# Patient Record
Sex: FEMALE | Race: WHITE | NOT HISPANIC OR LATINO | Employment: OTHER | URBAN - METROPOLITAN AREA
[De-identification: names, ages, dates, MRNs, and addresses within clinical notes are randomized per-mention and may not be internally consistent; named-entity substitution may affect disease eponyms.]

---

## 2017-05-01 PROBLEM — N20.0 KIDNEY STONE: Status: ACTIVE | Noted: 2017-05-01

## 2017-05-01 PROBLEM — I10 ESSENTIAL HYPERTENSION: Status: ACTIVE | Noted: 2017-05-01

## 2017-05-01 PROBLEM — M19.90 ARTHRITIS: Status: ACTIVE | Noted: 2017-05-01

## 2017-05-01 PROBLEM — N28.1 RENAL CYST: Status: ACTIVE | Noted: 2017-05-01

## 2017-05-01 PROBLEM — I77.9 CAROTID ARTERY DISEASE: Status: ACTIVE | Noted: 2017-05-01

## 2017-05-01 PROBLEM — E78.5 HLD (HYPERLIPIDEMIA): Status: ACTIVE | Noted: 2017-05-01

## 2017-05-01 PROBLEM — N25.81 SECONDARY HYPERPARATHYROIDISM OF RENAL ORIGIN: Status: ACTIVE | Noted: 2017-05-01

## 2017-05-01 PROBLEM — K21.9 GERD (GASTROESOPHAGEAL REFLUX DISEASE): Status: ACTIVE | Noted: 2017-05-01

## 2017-05-01 PROBLEM — I73.9 PAD (PERIPHERAL ARTERY DISEASE): Status: ACTIVE | Noted: 2017-05-01

## 2017-05-01 PROBLEM — Z72.0 TOBACCO ABUSE: Status: ACTIVE | Noted: 2017-05-01

## 2017-05-01 PROBLEM — E79.0 HYPERURICEMIA: Status: ACTIVE | Noted: 2017-05-01

## 2017-05-01 PROBLEM — N18.30 STAGE 3 CHRONIC KIDNEY DISEASE: Status: ACTIVE | Noted: 2017-05-01

## 2017-05-01 PROBLEM — J44.9 COPD (CHRONIC OBSTRUCTIVE PULMONARY DISEASE): Status: ACTIVE | Noted: 2017-05-01

## 2017-07-30 PROBLEM — R91.1 PULMONARY NODULE: Status: ACTIVE | Noted: 2017-07-30

## 2017-08-30 PROBLEM — R11.2 INTRACTABLE VOMITING WITH NAUSEA: Status: ACTIVE | Noted: 2017-08-30

## 2017-08-31 PROBLEM — R79.89 ELEVATED TROPONIN: Status: ACTIVE | Noted: 2017-08-31

## 2017-08-31 PROBLEM — I25.810 CORONARY ARTERY DISEASE INVOLVING AUTOLOGOUS VEIN BYPASS GRAFT: Status: ACTIVE | Noted: 2017-08-31

## 2017-08-31 PROBLEM — N17.9 AKI (ACUTE KIDNEY INJURY): Status: ACTIVE | Noted: 2017-08-31

## 2017-09-01 PROBLEM — E87.6 HYPOKALEMIA: Status: ACTIVE | Noted: 2017-09-01

## 2017-09-01 PROBLEM — E87.1 HYPONATREMIA: Status: ACTIVE | Noted: 2017-09-01

## 2017-09-01 PROBLEM — E83.39 HYPOPHOSPHATEMIA: Status: ACTIVE | Noted: 2017-09-01

## 2017-09-01 PROBLEM — N39.0 UTI (URINARY TRACT INFECTION): Status: ACTIVE | Noted: 2017-09-01

## 2017-09-01 PROBLEM — I25.810 CORONARY ARTERY DISEASE INVOLVING AUTOLOGOUS VEIN BYPASS GRAFT: Status: ACTIVE | Noted: 2017-09-01

## 2017-09-01 PROBLEM — R79.89 ELEVATED TROPONIN: Status: ACTIVE | Noted: 2017-09-01

## 2017-09-01 PROBLEM — N17.9 AKI (ACUTE KIDNEY INJURY): Status: ACTIVE | Noted: 2017-09-01

## 2017-09-02 ENCOUNTER — NURSE TRIAGE (OUTPATIENT)
Dept: ADMINISTRATIVE | Facility: CLINIC | Age: 67
End: 2017-09-02

## 2017-09-02 NOTE — TELEPHONE ENCOUNTER
Reason for Disposition   Caller has medication question only, adult not sick, and triager answers question    Protocols used: ST MEDICATION QUESTION CALL-A-AH    Spouse request information on d/c medication . Education completed per [facility] On [facility] Advice including taking Potassium 20 meq 1 tab daily as prescribed as of 9/1/17. Patient/ Caregive verbalize understanding.

## 2017-09-06 ENCOUNTER — PATIENT OUTREACH (OUTPATIENT)
Dept: ADMINISTRATIVE | Facility: CLINIC | Age: 67
End: 2017-09-06

## 2017-09-06 NOTE — PROGRESS NOTES
C3 nurse attempted to contact patient. The following occurred:   C3 nurse attempted to contact Caryn Rodriguez for a TCC post hospital discharge follow up call. The patient is unable to conduct the call @ this time. The patient requested a callback.    The patient has a scheduled HOSFU appointment with Dr Sterling Wellington on 09/12/17 @ 0820 hrs.

## 2017-09-06 NOTE — PROGRESS NOTES
TCC script completed with patient. Reports that [facility] asked if she wanted homehealth and I refused but now I think I need them. Message sent to [place] [facility].

## 2017-09-06 NOTE — PATIENT INSTRUCTIONS
"How to Control Nausea and Vomiting     Taken before meals, medicines can help ease nausea.    Nausea is feeling that you need to throw up. Throwing up occurs when your body forces food that is in your stomach out through your mouth. Nausea and vomiting are symptoms that are caused by many things. They can happen when a condition or disease, medicine, medical treatment, or a poisonous substance affects the area in your brain that controls vomiting. Some conditions or diseases can cause nausea, abdominal pain or cramps, and vomiting. The symptoms can be mild and go away by themselves. Other symptoms can be serious. You will need to see your healthcare provider for these.  Nausea and vomiting are common. They can be caused by many things. These include:  · "Stomach flu" (gastroenteritis)  · Food poisoning  · Stomach pain (gastritis)  · Blockages  They can also be caused by a head injury, an infection in the brain or inside the ear, or migraines. Other common causes of nausea and vomiting include:  · Brain tumor  · Brain bruise  · Motion sickness  · Drugs. These include alcohol, pain medicines such as morphine, and cancer medicines.  · Toxins. These are poisonous things like plants or liquids that are swallowed by accident.  · Advanced types of cancer  · Movement problems (psychogenic problems)  · Extra pressure in the fluid that surrounds the brain and spinal cord (elevated intracranial pressure)     Nausea and vomiting are also common side effects of chemotherapy and radiation therapy. Side effects happen when treatment changes some normal cells as well as cancer cells. In this case, the cells lining your stomach and the part of your brain that controls vomiting are affected. Other more serious causes of vomiting may be hard to find early in the illness.     When to seek medical advice  Call your healthcare provider right away if you have the following:  · Nausea or vomiting that lasts 24 hours or more  · Trouble " keeping fluids down   Medicines can help  Nausea or vomiting can often be prevented or controlled with medicines (antiemetics). Your doctor may give you antiemetics before or after treatment if you are getting chemotherapy or other medical treatments that cause nausea or vomiting.  Eating tips  · If you have medicines to control nausea, take them before meals as directed.  · Avoid fatty or greasy foods while nauseated.  · Eat small meals slowly throughout the day.  · Ask someone to sit with you while you eat to keep you from thinking about feeling nauseated.  · Eat foods at room temperature or colder to avoid strong smells.  · Eat dry foods, such as toast, crackers, or pretzels. Also eat cool, light foods, such as applesauce, and bland foods, such as oatmeal or skinned chicken.   Other ways to feel better  · Get a little fresh air. Take a short walk.  · Talk to a friend, listen to music, or watch TV.  · Take a few deep, slow breaths.  · Eat by candlelight or in surroundings that you find relaxing.  · Use a technique, such as guided imagery, to help you relax. Imagine yourself in a beautiful, restful scene. Or daydream about the place youd most like to be.  Date Last Reviewed: 1/6/2016  © 8546-3177 [employer]. [address] [place] [address], [place] [address]. All rights reserved. This information is not intended as a substitute for professional medical care. Always follow your healthcare professional's instructions.

## 2017-10-15 PROBLEM — R60.0 LOCALIZED EDEMA: Status: ACTIVE | Noted: 2017-10-15

## 2018-01-08 PROBLEM — E83.52 HYPERCALCEMIA: Status: ACTIVE | Noted: 2018-01-08

## 2018-03-16 PROBLEM — Z72.0 TOBACCO ABUSE: Status: RESOLVED | Noted: 2017-05-01 | Resolved: 2018-03-16

## 2019-03-11 PROBLEM — R55 SYNCOPE AND COLLAPSE: Status: ACTIVE | Noted: 2019-03-11

## 2019-11-20 PROBLEM — H25.13 NUCLEAR SCLEROSIS, BILATERAL: Status: ACTIVE | Noted: 2019-11-20

## 2019-11-20 PROBLEM — H25.13 NUCLEAR SCLEROSIS, BILATERAL: Status: RESOLVED | Noted: 2019-11-20 | Resolved: 2019-11-20

## 2020-01-07 PROBLEM — R60.0 LOCALIZED EDEMA: Status: RESOLVED | Noted: 2017-10-15 | Resolved: 2020-01-07

## 2020-01-07 PROBLEM — E83.39 HYPOPHOSPHATEMIA: Status: RESOLVED | Noted: 2017-09-01 | Resolved: 2020-01-07

## 2020-01-07 PROBLEM — E87.6 HYPOKALEMIA: Status: RESOLVED | Noted: 2017-09-01 | Resolved: 2020-01-07

## 2020-01-07 PROBLEM — N39.0 UTI (URINARY TRACT INFECTION): Status: RESOLVED | Noted: 2017-09-01 | Resolved: 2020-01-07

## 2020-01-07 PROBLEM — E87.1 HYPONATREMIA: Status: RESOLVED | Noted: 2017-09-01 | Resolved: 2020-01-07

## 2020-01-07 PROBLEM — R79.89 ELEVATED TROPONIN: Status: RESOLVED | Noted: 2017-09-01 | Resolved: 2020-01-07

## 2020-01-07 PROBLEM — I50.32 CHRONIC DIASTOLIC HEART FAILURE: Status: ACTIVE | Noted: 2020-01-07

## 2020-01-07 PROBLEM — E83.52 HYPERCALCEMIA: Status: RESOLVED | Noted: 2018-01-08 | Resolved: 2020-01-07

## 2020-01-07 PROBLEM — R55 SYNCOPE AND COLLAPSE: Status: RESOLVED | Noted: 2019-03-11 | Resolved: 2020-01-07

## 2020-01-07 PROBLEM — R11.2 INTRACTABLE VOMITING WITH NAUSEA: Status: RESOLVED | Noted: 2017-08-30 | Resolved: 2020-01-07

## 2020-01-31 PROBLEM — M54.16 ACUTE LEFT LUMBAR RADICULOPATHY: Status: ACTIVE | Noted: 2020-01-31

## 2020-02-01 ENCOUNTER — NURSE TRIAGE (OUTPATIENT)
Dept: ADMINISTRATIVE | Facility: CLINIC | Age: 70
End: 2020-02-01

## 2020-02-01 NOTE — TELEPHONE ENCOUNTER
Patient states she wants to take Benadryl advised as per protocol. Patient states if the Benadryl doesn't resolve the problem she will go to the ED  Reason for Disposition   Could be severe allergic reaction   [1] Widespread hives, itching or facial swelling AND [2] onset < 2 hours of exposure to high-risk allergen (e.g., sting, nuts, 1st dose of antibiotic)    Additional Information   Negative: [1] Life-threatening reaction (anaphylaxis) in the past to similar substance (e.g., food, insect bite/sting, chemical, etc.) AND [2] < 2 hours since exposure   Negative: Difficulty breathing or wheezing   Negative: [1] Difficulty swallowing or slurred speech AND [2] sudden onset   Negative: Sounds like a life-threatening emergency to the triager   Negative: [1] Life-threatening reaction in the past to similar substance (e.g., food, insect bite/sting, medication, etc.) AND [2] < 2 hours since exposure   Negative: Wheezing, stridor, hoarseness, or difficulty breathing   Negative: [1] Tightness in the chest or throat AND [2] begins within 2 hours of exposure to allergic substance   Negative: Difficulty swallowing, drooling or slurred speech   Negative: Difficult to awaken or acting confused (e.g., disoriented, slurred speech)   Negative: Unresponsive, passed out or very weak   Negative: Other symptom of severe allergic reaction (Exception: Hives or facial swelling alone)   Negative: Sounds like a life-threatening emergency to the triager   Negative: [1] Widespread hives AND [2] onset > 2 hours after exposure to high-risk allergen (e.g., sting, nuts, 1st dose of antibiotic)   Negative: [1] Widespread itching AND [2] onset > 2 hours after exposure to high-risk allergen (e.g., sting, nuts, 1st dose of antibiotic)   Negative: [1] Face swelling AND [2] onset > 2 hours after exposure to high-risk allergen (e.g., sting, nuts, 1st dose of antibiotic)    Protocols used: ITCHING - WIDESPREAD-A-, [id number]

## 2020-03-13 PROBLEM — M47.896 OTHER SPONDYLOSIS, LUMBAR REGION: Status: ACTIVE | Noted: 2020-03-13

## 2020-07-13 ENCOUNTER — CLINICAL SUPPORT (OUTPATIENT)
Dept: URGENT CARE | Facility: CLINIC | Age: 70
End: 2020-07-13
Payer: COMMERCIAL

## 2020-07-13 VITALS — TEMPERATURE: 97 F

## 2020-07-13 DIAGNOSIS — Z01.818 PRE-OP EVALUATION: ICD-10-CM

## 2020-07-13 PROCEDURE — U0003 INFECTIOUS AGENT DETECTION BY NUCLEIC ACID (DNA OR RNA); SEVERE ACUTE RESPIRATORY SYNDROME CORONAVIRUS 2 (SARS-COV-2) (CORONAVIRUS DISEASE [COVID-19]), AMPLIFIED PROBE TECHNIQUE, MAKING USE OF HIGH THROUGHPUT TECHNOLOGIES AS DESCRIBED BY CMS-2020-01-R: HCPCS

## 2020-07-14 LAB — SARS-COV-2 RNA RESP QL NAA+PROBE: NOT DETECTED

## 2020-08-06 ENCOUNTER — HOSPITAL ENCOUNTER (INPATIENT)
Facility: OTHER | Age: 70
LOS: 3 days | Discharge: HOME HEALTH | DRG: 377 | End: 2020-08-09
Attending: INTERNAL MEDICINE | Admitting: INTERNAL MEDICINE
Payer: COMMERCIAL

## 2020-08-06 DIAGNOSIS — I95.9 HYPOTENSION: ICD-10-CM

## 2020-08-06 DIAGNOSIS — J44.9 CHRONIC OBSTRUCTIVE PULMONARY DISEASE, UNSPECIFIED COPD TYPE: ICD-10-CM

## 2020-08-06 DIAGNOSIS — K92.2 ACUTE GI BLEEDING: Primary | ICD-10-CM

## 2020-08-06 PROBLEM — E87.20 LACTIC ACIDOSIS: Status: ACTIVE | Noted: 2020-08-06

## 2020-08-06 PROBLEM — N18.30 ACUTE RENAL FAILURE SUPERIMPOSED ON STAGE 3 CHRONIC KIDNEY DISEASE: Status: ACTIVE | Noted: 2020-08-06

## 2020-08-06 PROBLEM — N17.9 ACUTE RENAL FAILURE SUPERIMPOSED ON STAGE 3 CHRONIC KIDNEY DISEASE: Status: ACTIVE | Noted: 2020-08-06

## 2020-08-06 PROBLEM — R79.1 SUPRATHERAPEUTIC INR: Status: ACTIVE | Noted: 2020-08-06

## 2020-08-06 LAB
BASOPHILS # BLD AUTO: ABNORMAL K/UL (ref 0–0.2)
BASOPHILS NFR BLD: 0 % (ref 0–1.9)
DIFFERENTIAL METHOD: ABNORMAL
EOSINOPHIL # BLD AUTO: ABNORMAL K/UL (ref 0–0.5)
EOSINOPHIL NFR BLD: 0 % (ref 0–8)
ERYTHROCYTE [DISTWIDTH] IN BLOOD BY AUTOMATED COUNT: 14.1 % (ref 11.5–14.5)
HCT VFR BLD AUTO: 30.8 % (ref 37–48.5)
HGB BLD-MCNC: 9.6 G/DL (ref 12–16)
IMM GRANULOCYTES # BLD AUTO: ABNORMAL K/UL (ref 0–0.04)
IMM GRANULOCYTES NFR BLD AUTO: ABNORMAL % (ref 0–0.5)
LACTATE SERPL-SCNC: 2.6 MMOL/L (ref 0.5–2.2)
LYMPHOCYTES # BLD AUTO: ABNORMAL K/UL (ref 1–4.8)
LYMPHOCYTES NFR BLD: 9 % (ref 18–48)
MCH RBC QN AUTO: 29 PG (ref 27–31)
MCHC RBC AUTO-ENTMCNC: 31.2 G/DL (ref 32–36)
MCV RBC AUTO: 93 FL (ref 82–98)
MONOCYTES # BLD AUTO: ABNORMAL K/UL (ref 0.3–1)
MONOCYTES NFR BLD: 3 % (ref 4–15)
NEUTROPHILS NFR BLD: 88 % (ref 38–73)
NRBC BLD-RTO: 0 /100 WBC
PLATELET # BLD AUTO: 337 K/UL (ref 150–350)
PMV BLD AUTO: 9.6 FL (ref 9.2–12.9)
RBC # BLD AUTO: 3.31 M/UL (ref 4–5.4)
WBC # BLD AUTO: 44.94 K/UL (ref 3.9–12.7)

## 2020-08-06 PROCEDURE — 99223 1ST HOSP IP/OBS HIGH 75: CPT | Mod: ,,, | Performed by: PHYSICIAN ASSISTANT

## 2020-08-06 PROCEDURE — 86920 COMPATIBILITY TEST SPIN: CPT

## 2020-08-06 PROCEDURE — 36415 COLL VENOUS BLD VENIPUNCTURE: CPT

## 2020-08-06 PROCEDURE — 85027 COMPLETE CBC AUTOMATED: CPT

## 2020-08-06 PROCEDURE — 63600175 PHARM REV CODE 636 W HCPCS: Performed by: PHYSICIAN ASSISTANT

## 2020-08-06 PROCEDURE — 20000000 HC ICU ROOM

## 2020-08-06 PROCEDURE — 93010 ELECTROCARDIOGRAM REPORT: CPT | Mod: ,,, | Performed by: INTERNAL MEDICINE

## 2020-08-06 PROCEDURE — 93010 EKG 12-LEAD: ICD-10-PCS | Mod: ,,, | Performed by: INTERNAL MEDICINE

## 2020-08-06 PROCEDURE — 99223 PR INITIAL HOSPITAL CARE,LEVL III: ICD-10-PCS | Mod: ,,, | Performed by: PHYSICIAN ASSISTANT

## 2020-08-06 PROCEDURE — 25000003 PHARM REV CODE 250: Performed by: PHYSICIAN ASSISTANT

## 2020-08-06 PROCEDURE — 99900035 HC TECH TIME PER 15 MIN (STAT)

## 2020-08-06 PROCEDURE — 93005 ELECTROCARDIOGRAM TRACING: CPT

## 2020-08-06 PROCEDURE — C9113 INJ PANTOPRAZOLE SODIUM, VIA: HCPCS | Performed by: PHYSICIAN ASSISTANT

## 2020-08-06 PROCEDURE — 83605 ASSAY OF LACTIC ACID: CPT | Mod: 91

## 2020-08-06 PROCEDURE — 86850 RBC ANTIBODY SCREEN: CPT | Mod: 91

## 2020-08-06 PROCEDURE — 85007 BL SMEAR W/DIFF WBC COUNT: CPT

## 2020-08-06 RX ORDER — CIPROFLOXACIN 2 MG/ML
200 INJECTION, SOLUTION INTRAVENOUS
Status: DISCONTINUED | OUTPATIENT
Start: 2020-08-07 | End: 2020-08-09 | Stop reason: HOSPADM

## 2020-08-06 RX ORDER — SODIUM CHLORIDE 9 MG/ML
INJECTION, SOLUTION INTRAVENOUS CONTINUOUS
Status: DISCONTINUED | OUTPATIENT
Start: 2020-08-06 | End: 2020-08-09

## 2020-08-06 RX ORDER — MONTELUKAST SODIUM 10 MG/1
10 TABLET ORAL NIGHTLY
Status: DISCONTINUED | OUTPATIENT
Start: 2020-08-06 | End: 2020-08-09 | Stop reason: HOSPADM

## 2020-08-06 RX ORDER — PANTOPRAZOLE SODIUM 40 MG/10ML
40 INJECTION, POWDER, LYOPHILIZED, FOR SOLUTION INTRAVENOUS 2 TIMES DAILY
Status: DISCONTINUED | OUTPATIENT
Start: 2020-08-06 | End: 2020-08-08

## 2020-08-06 RX ORDER — METRONIDAZOLE 500 MG/100ML
500 INJECTION, SOLUTION INTRAVENOUS
Status: DISCONTINUED | OUTPATIENT
Start: 2020-08-07 | End: 2020-08-09 | Stop reason: HOSPADM

## 2020-08-06 RX ORDER — IPRATROPIUM BROMIDE AND ALBUTEROL SULFATE 2.5; .5 MG/3ML; MG/3ML
3 SOLUTION RESPIRATORY (INHALATION) EVERY 4 HOURS PRN
Status: DISCONTINUED | OUTPATIENT
Start: 2020-08-06 | End: 2020-08-09 | Stop reason: HOSPADM

## 2020-08-06 RX ORDER — HYDROCODONE BITARTRATE AND ACETAMINOPHEN 500; 5 MG/1; MG/1
TABLET ORAL
Status: DISCONTINUED | OUTPATIENT
Start: 2020-08-07 | End: 2020-08-09 | Stop reason: HOSPADM

## 2020-08-06 RX ADMIN — PANTOPRAZOLE SODIUM 40 MG: 40 INJECTION, POWDER, LYOPHILIZED, FOR SOLUTION INTRAVENOUS at 09:08

## 2020-08-06 RX ADMIN — SODIUM CHLORIDE: 0.9 INJECTION, SOLUTION INTRAVENOUS at 11:08

## 2020-08-06 RX ADMIN — MONTELUKAST 10 MG: 10 TABLET, FILM COATED ORAL at 09:08

## 2020-08-07 PROBLEM — R82.90 ABNORMAL URINALYSIS: Status: ACTIVE | Noted: 2017-09-01

## 2020-08-07 PROBLEM — R10.9 ABDOMINAL PAIN: Status: ACTIVE | Noted: 2020-08-07

## 2020-08-07 PROBLEM — D62 ACUTE BLOOD LOSS ANEMIA: Status: ACTIVE | Noted: 2020-08-07

## 2020-08-07 LAB
ABO + RH BLD: NORMAL
ALBUMIN SERPL BCP-MCNC: 2.8 G/DL (ref 3.5–5.2)
ALP SERPL-CCNC: 69 U/L (ref 55–135)
ALT SERPL W/O P-5'-P-CCNC: 14 U/L (ref 10–44)
AMYLASE SERPL-CCNC: 80 U/L (ref 20–110)
ANION GAP SERPL CALC-SCNC: 10 MMOL/L (ref 8–16)
ANISOCYTOSIS BLD QL SMEAR: SLIGHT
AST SERPL-CCNC: 18 U/L (ref 10–40)
BACTERIA #/AREA URNS HPF: ABNORMAL /HPF
BASOPHILS # BLD AUTO: 0.06 K/UL (ref 0–0.2)
BASOPHILS # BLD AUTO: ABNORMAL K/UL (ref 0–0.2)
BASOPHILS # BLD AUTO: ABNORMAL K/UL (ref 0–0.2)
BASOPHILS NFR BLD: 0.4 % (ref 0–1.9)
BASOPHILS NFR BLD: 0.4 % (ref 0–1.9)
BASOPHILS NFR BLD: 0.5 % (ref 0–1.9)
BASOPHILS NFR BLD: 1 % (ref 0–1.9)
BASOPHILS NFR BLD: 1 % (ref 0–1.9)
BILIRUB SERPL-MCNC: 0.7 MG/DL (ref 0.1–1)
BILIRUB UR QL STRIP: NEGATIVE
BLD GP AB SCN CELLS X3 SERPL QL: NORMAL
BLD PROD TYP BPU: NORMAL
BLOOD UNIT EXPIRATION DATE: NORMAL
BLOOD UNIT TYPE CODE: 5100
BLOOD UNIT TYPE: NORMAL
BUN SERPL-MCNC: 39 MG/DL (ref 8–23)
C DIFF GDH STL QL: NEGATIVE
C DIFF TOX A+B STL QL IA: NEGATIVE
CALCIUM SERPL-MCNC: 8.2 MG/DL (ref 8.7–10.5)
CHLORIDE SERPL-SCNC: 108 MMOL/L (ref 95–110)
CLARITY UR: ABNORMAL
CO2 SERPL-SCNC: 19 MMOL/L (ref 23–29)
CODING SYSTEM: NORMAL
COLOR UR: YELLOW
CREAT SERPL-MCNC: 1.8 MG/DL (ref 0.5–1.4)
CREAT UR-MCNC: 116.4 MG/DL (ref 15–325)
D DIMER PPP IA.FEU-MCNC: 1.91 MG/L FEU
DIFFERENTIAL METHOD: ABNORMAL
DISPENSE STATUS: NORMAL
EOSINOPHIL # BLD AUTO: 0 K/UL (ref 0–0.5)
EOSINOPHIL # BLD AUTO: 0 K/UL (ref 0–0.5)
EOSINOPHIL # BLD AUTO: 0.1 K/UL (ref 0–0.5)
EOSINOPHIL # BLD AUTO: ABNORMAL K/UL (ref 0–0.5)
EOSINOPHIL # BLD AUTO: ABNORMAL K/UL (ref 0–0.5)
EOSINOPHIL NFR BLD: 0.1 % (ref 0–8)
EOSINOPHIL NFR BLD: 0.3 % (ref 0–8)
EOSINOPHIL NFR BLD: 0.6 % (ref 0–8)
EOSINOPHIL NFR BLD: 1 % (ref 0–8)
EOSINOPHIL NFR BLD: 1 % (ref 0–8)
ERYTHROCYTE [DISTWIDTH] IN BLOOD BY AUTOMATED COUNT: 23.4 % (ref 11.5–14.5)
ERYTHROCYTE [DISTWIDTH] IN BLOOD BY AUTOMATED COUNT: 23.4 % (ref 11.5–14.5)
ERYTHROCYTE [DISTWIDTH] IN BLOOD BY AUTOMATED COUNT: 23.7 % (ref 11.5–14.5)
ERYTHROCYTE [DISTWIDTH] IN BLOOD BY AUTOMATED COUNT: 23.7 % (ref 11.5–14.5)
ERYTHROCYTE [DISTWIDTH] IN BLOOD BY AUTOMATED COUNT: 23.9 % (ref 11.5–14.5)
EST. GFR  (AFRICAN AMERICAN): 32 ML/MIN/1.73 M^2
EST. GFR  (NON AFRICAN AMERICAN): 28 ML/MIN/1.73 M^2
GLUCOSE SERPL-MCNC: 144 MG/DL (ref 70–110)
GLUCOSE UR QL STRIP: NEGATIVE
HCT VFR BLD AUTO: 23.7 % (ref 37–48.5)
HCT VFR BLD AUTO: 24.2 % (ref 37–48.5)
HCT VFR BLD AUTO: 25.6 % (ref 37–48.5)
HCT VFR BLD AUTO: 27.2 % (ref 37–48.5)
HCT VFR BLD AUTO: 27.7 % (ref 37–48.5)
HCT VFR BLD AUTO: 31.3 % (ref 37–48.5)
HCT VFR BLD AUTO: 31.3 % (ref 37–48.5)
HGB BLD-MCNC: 7.5 G/DL (ref 12–16)
HGB BLD-MCNC: 7.5 G/DL (ref 12–16)
HGB BLD-MCNC: 8.1 G/DL (ref 12–16)
HGB BLD-MCNC: 8.7 G/DL (ref 12–16)
HGB BLD-MCNC: 8.7 G/DL (ref 12–16)
HGB BLD-MCNC: 9.8 G/DL (ref 12–16)
HGB BLD-MCNC: 9.8 G/DL (ref 12–16)
HGB UR QL STRIP: ABNORMAL
HYPOCHROMIA BLD QL SMEAR: ABNORMAL
HYPOCHROMIA BLD QL SMEAR: ABNORMAL
IMM GRANULOCYTES # BLD AUTO: 0.08 K/UL (ref 0–0.04)
IMM GRANULOCYTES # BLD AUTO: 0.11 K/UL (ref 0–0.04)
IMM GRANULOCYTES # BLD AUTO: 0.15 K/UL (ref 0–0.04)
IMM GRANULOCYTES # BLD AUTO: ABNORMAL K/UL (ref 0–0.04)
IMM GRANULOCYTES # BLD AUTO: ABNORMAL K/UL (ref 0–0.04)
IMM GRANULOCYTES NFR BLD AUTO: 0.6 % (ref 0–0.5)
IMM GRANULOCYTES NFR BLD AUTO: 0.7 % (ref 0–0.5)
IMM GRANULOCYTES NFR BLD AUTO: 0.9 % (ref 0–0.5)
IMM GRANULOCYTES NFR BLD AUTO: ABNORMAL % (ref 0–0.5)
IMM GRANULOCYTES NFR BLD AUTO: ABNORMAL % (ref 0–0.5)
INR PPP: 1.2 (ref 0.8–1.2)
KETONES UR QL STRIP: NEGATIVE
LDH SERPL L TO P-CCNC: 390 U/L (ref 110–260)
LEUKOCYTE ESTERASE UR QL STRIP: ABNORMAL
LYMPHOCYTES # BLD AUTO: 1.9 K/UL (ref 1–4.8)
LYMPHOCYTES # BLD AUTO: 2 K/UL (ref 1–4.8)
LYMPHOCYTES # BLD AUTO: 2.1 K/UL (ref 1–4.8)
LYMPHOCYTES # BLD AUTO: ABNORMAL K/UL (ref 1–4.8)
LYMPHOCYTES # BLD AUTO: ABNORMAL K/UL (ref 1–4.8)
LYMPHOCYTES NFR BLD: 1 % (ref 18–48)
LYMPHOCYTES NFR BLD: 1 % (ref 18–48)
LYMPHOCYTES NFR BLD: 12.5 % (ref 18–48)
LYMPHOCYTES NFR BLD: 14.3 % (ref 18–48)
LYMPHOCYTES NFR BLD: 14.4 % (ref 18–48)
MCH RBC QN AUTO: 26.1 PG (ref 27–31)
MCH RBC QN AUTO: 26.4 PG (ref 27–31)
MCH RBC QN AUTO: 26.5 PG (ref 27–31)
MCHC RBC AUTO-ENTMCNC: 31.3 G/DL (ref 32–36)
MCHC RBC AUTO-ENTMCNC: 31.3 G/DL (ref 32–36)
MCHC RBC AUTO-ENTMCNC: 31.4 G/DL (ref 32–36)
MCHC RBC AUTO-ENTMCNC: 31.6 G/DL (ref 32–36)
MCHC RBC AUTO-ENTMCNC: 32 G/DL (ref 32–36)
MCV RBC AUTO: 83 FL (ref 82–98)
MCV RBC AUTO: 84 FL (ref 82–98)
MICROSCOPIC COMMENT: ABNORMAL
MONOCYTES # BLD AUTO: 0.8 K/UL (ref 0.3–1)
MONOCYTES # BLD AUTO: 0.9 K/UL (ref 0.3–1)
MONOCYTES # BLD AUTO: 1.2 K/UL (ref 0.3–1)
MONOCYTES # BLD AUTO: ABNORMAL K/UL (ref 0.3–1)
MONOCYTES # BLD AUTO: ABNORMAL K/UL (ref 0.3–1)
MONOCYTES NFR BLD: 3 % (ref 4–15)
MONOCYTES NFR BLD: 3 % (ref 4–15)
MONOCYTES NFR BLD: 5.3 % (ref 4–15)
MONOCYTES NFR BLD: 7.2 % (ref 4–15)
MONOCYTES NFR BLD: 7.9 % (ref 4–15)
NEUTROPHILS # BLD AUTO: 11.3 K/UL (ref 1.8–7.7)
NEUTROPHILS # BLD AUTO: 12.8 K/UL (ref 1.8–7.7)
NEUTROPHILS # BLD AUTO: 9.9 K/UL (ref 1.8–7.7)
NEUTROPHILS NFR BLD: 76.4 % (ref 38–73)
NEUTROPHILS NFR BLD: 76.7 % (ref 38–73)
NEUTROPHILS NFR BLD: 80.8 % (ref 38–73)
NEUTROPHILS NFR BLD: 94 % (ref 38–73)
NEUTROPHILS NFR BLD: 94 % (ref 38–73)
NITRITE UR QL STRIP: NEGATIVE
NRBC BLD-RTO: 0 /100 WBC
OSMOLALITY UR: 429 MOSM/KG (ref 50–1200)
OVALOCYTES BLD QL SMEAR: ABNORMAL
OVALOCYTES BLD QL SMEAR: ABNORMAL
PH UR STRIP: 6 [PH] (ref 5–8)
PLATELET # BLD AUTO: 233 K/UL (ref 150–350)
PLATELET # BLD AUTO: 249 K/UL (ref 150–350)
PLATELET # BLD AUTO: 258 K/UL (ref 150–350)
PLATELET # BLD AUTO: 304 K/UL (ref 150–350)
PLATELET # BLD AUTO: 304 K/UL (ref 150–350)
PLATELET BLD QL SMEAR: ABNORMAL
PMV BLD AUTO: 9.3 FL (ref 9.2–12.9)
PMV BLD AUTO: 9.3 FL (ref 9.2–12.9)
PMV BLD AUTO: 9.6 FL (ref 9.2–12.9)
PMV BLD AUTO: 9.6 FL (ref 9.2–12.9)
PMV BLD AUTO: 9.7 FL (ref 9.2–12.9)
POIKILOCYTOSIS BLD QL SMEAR: SLIGHT
POIKILOCYTOSIS BLD QL SMEAR: SLIGHT
POLYCHROMASIA BLD QL SMEAR: ABNORMAL
POTASSIUM SERPL-SCNC: 4.9 MMOL/L (ref 3.5–5.1)
PROCALCITONIN SERPL IA-MCNC: 0.12 NG/ML
PROT SERPL-MCNC: 5.3 G/DL (ref 6–8.4)
PROT UR QL STRIP: NEGATIVE
PROT UR-MCNC: 14 MG/DL (ref 0–15)
PROT/CREAT UR: 0.12 MG/G{CREAT} (ref 0–0.2)
PROTHROMBIN TIME: 12.5 SEC (ref 9–12.5)
RBC # BLD AUTO: 3.06 M/UL (ref 4–5.4)
RBC # BLD AUTO: 3.29 M/UL (ref 4–5.4)
RBC # BLD AUTO: 3.33 M/UL (ref 4–5.4)
RBC # BLD AUTO: 3.76 M/UL (ref 4–5.4)
RBC # BLD AUTO: 3.76 M/UL (ref 4–5.4)
RBC #/AREA URNS HPF: 3 /HPF (ref 0–4)
SODIUM SERPL-SCNC: 137 MMOL/L (ref 136–145)
SODIUM UR-SCNC: 25 MMOL/L (ref 20–250)
SP GR UR STRIP: 1.02 (ref 1–1.03)
SQUAMOUS #/AREA URNS HPF: 12 /HPF
TRANS ERYTHROCYTES VOL PATIENT: NORMAL ML
URN SPEC COLLECT METH UR: ABNORMAL
UROBILINOGEN UR STRIP-ACNC: NEGATIVE EU/DL
WBC # BLD AUTO: 12.89 K/UL (ref 3.9–12.7)
WBC # BLD AUTO: 14.79 K/UL (ref 3.9–12.7)
WBC # BLD AUTO: 15.83 K/UL (ref 3.9–12.7)
WBC # BLD AUTO: 25.26 K/UL (ref 3.9–12.7)
WBC # BLD AUTO: 25.26 K/UL (ref 3.9–12.7)
WBC #/AREA URNS HPF: 20 /HPF (ref 0–5)

## 2020-08-07 PROCEDURE — 85014 HEMATOCRIT: CPT

## 2020-08-07 PROCEDURE — 63600175 PHARM REV CODE 636 W HCPCS: Performed by: PHYSICIAN ASSISTANT

## 2020-08-07 PROCEDURE — C9113 INJ PANTOPRAZOLE SODIUM, VIA: HCPCS | Performed by: PHYSICIAN ASSISTANT

## 2020-08-07 PROCEDURE — 25000003 PHARM REV CODE 250: Performed by: INTERNAL MEDICINE

## 2020-08-07 PROCEDURE — 85025 COMPLETE CBC W/AUTO DIFF WBC: CPT

## 2020-08-07 PROCEDURE — 94640 AIRWAY INHALATION TREATMENT: CPT

## 2020-08-07 PROCEDURE — 83615 LACTATE (LD) (LDH) ENZYME: CPT

## 2020-08-07 PROCEDURE — 36415 COLL VENOUS BLD VENIPUNCTURE: CPT

## 2020-08-07 PROCEDURE — S0073 INJECTION, AZTREONAM, 500 MG: HCPCS | Performed by: PHYSICIAN ASSISTANT

## 2020-08-07 PROCEDURE — 36430 TRANSFUSION BLD/BLD COMPNT: CPT

## 2020-08-07 PROCEDURE — 25000003 PHARM REV CODE 250

## 2020-08-07 PROCEDURE — 20000000 HC ICU ROOM

## 2020-08-07 PROCEDURE — 87449 NOS EACH ORGANISM AG IA: CPT

## 2020-08-07 PROCEDURE — 63600531 PHARM REV CODE 636 NO ALT 250 W HCPCS: Performed by: INTERNAL MEDICINE

## 2020-08-07 PROCEDURE — 81000 URINALYSIS NONAUTO W/SCOPE: CPT

## 2020-08-07 PROCEDURE — 63600175 PHARM REV CODE 636 W HCPCS

## 2020-08-07 PROCEDURE — 94761 N-INVAS EAR/PLS OXIMETRY MLT: CPT

## 2020-08-07 PROCEDURE — 87088 URINE BACTERIA CULTURE: CPT

## 2020-08-07 PROCEDURE — 87040 BLOOD CULTURE FOR BACTERIA: CPT

## 2020-08-07 PROCEDURE — P9021 RED BLOOD CELLS UNIT: HCPCS

## 2020-08-07 PROCEDURE — 87086 URINE CULTURE/COLONY COUNT: CPT

## 2020-08-07 PROCEDURE — 99233 PR SUBSEQUENT HOSPITAL CARE,LEVL III: ICD-10-PCS | Mod: ,,, | Performed by: INTERNAL MEDICINE

## 2020-08-07 PROCEDURE — 87186 SC STD MICRODIL/AGAR DIL: CPT

## 2020-08-07 PROCEDURE — 85379 FIBRIN DEGRADATION QUANT: CPT

## 2020-08-07 PROCEDURE — 99233 SBSQ HOSP IP/OBS HIGH 50: CPT | Mod: ,,, | Performed by: INTERNAL MEDICINE

## 2020-08-07 PROCEDURE — 87324 CLOSTRIDIUM AG IA: CPT

## 2020-08-07 PROCEDURE — 25000242 PHARM REV CODE 250 ALT 637 W/ HCPCS: Performed by: STUDENT IN AN ORGANIZED HEALTH CARE EDUCATION/TRAINING PROGRAM

## 2020-08-07 PROCEDURE — 84145 PROCALCITONIN (PCT): CPT

## 2020-08-07 PROCEDURE — 84156 ASSAY OF PROTEIN URINE: CPT

## 2020-08-07 PROCEDURE — 85018 HEMOGLOBIN: CPT

## 2020-08-07 PROCEDURE — 84300 ASSAY OF URINE SODIUM: CPT

## 2020-08-07 PROCEDURE — 25000003 PHARM REV CODE 250: Performed by: PHYSICIAN ASSISTANT

## 2020-08-07 PROCEDURE — 83935 ASSAY OF URINE OSMOLALITY: CPT

## 2020-08-07 PROCEDURE — S0030 INJECTION, METRONIDAZOLE: HCPCS | Performed by: PHYSICIAN ASSISTANT

## 2020-08-07 PROCEDURE — 87077 CULTURE AEROBIC IDENTIFY: CPT

## 2020-08-07 PROCEDURE — 85610 PROTHROMBIN TIME: CPT

## 2020-08-07 PROCEDURE — 80053 COMPREHEN METABOLIC PANEL: CPT

## 2020-08-07 PROCEDURE — C9132 KCENTRA, PER I.U.: HCPCS | Performed by: INTERNAL MEDICINE

## 2020-08-07 PROCEDURE — 82150 ASSAY OF AMYLASE: CPT

## 2020-08-07 PROCEDURE — A4216 STERILE WATER/SALINE, 10 ML: HCPCS | Performed by: INTERNAL MEDICINE

## 2020-08-07 RX ORDER — DIPHENHYDRAMINE HYDROCHLORIDE 50 MG/ML
25 INJECTION INTRAMUSCULAR; INTRAVENOUS ONCE
Status: COMPLETED | OUTPATIENT
Start: 2020-08-07 | End: 2020-08-07

## 2020-08-07 RX ORDER — DIPHENHYDRAMINE HCL 25 MG
25 CAPSULE ORAL ONCE
Status: COMPLETED | OUTPATIENT
Start: 2020-08-07 | End: 2020-08-07

## 2020-08-07 RX ORDER — MORPHINE SULFATE 2 MG/ML
2 INJECTION, SOLUTION INTRAMUSCULAR; INTRAVENOUS ONCE
Status: DISCONTINUED | OUTPATIENT
Start: 2020-08-07 | End: 2020-08-07

## 2020-08-07 RX ORDER — HYDROCODONE BITARTRATE AND ACETAMINOPHEN 5; 325 MG/1; MG/1
1 TABLET ORAL EVERY 6 HOURS PRN
Status: DISCONTINUED | OUTPATIENT
Start: 2020-08-07 | End: 2020-08-07

## 2020-08-07 RX ORDER — HYDROMORPHONE HYDROCHLORIDE 1 MG/ML
0.2 INJECTION, SOLUTION INTRAMUSCULAR; INTRAVENOUS; SUBCUTANEOUS ONCE
Status: COMPLETED | OUTPATIENT
Start: 2020-08-07 | End: 2020-08-07

## 2020-08-07 RX ORDER — VANCOMYCIN HCL IN 5 % DEXTROSE 1G/250ML
1000 PLASTIC BAG, INJECTION (ML) INTRAVENOUS
Status: DISCONTINUED | OUTPATIENT
Start: 2020-08-08 | End: 2020-08-07 | Stop reason: CLARIF

## 2020-08-07 RX ORDER — IPRATROPIUM BROMIDE AND ALBUTEROL SULFATE 2.5; .5 MG/3ML; MG/3ML
3 SOLUTION RESPIRATORY (INHALATION) EVERY 12 HOURS
Status: DISCONTINUED | OUTPATIENT
Start: 2020-08-07 | End: 2020-08-09 | Stop reason: HOSPADM

## 2020-08-07 RX ORDER — FLUTICASONE FUROATE AND VILANTEROL 100; 25 UG/1; UG/1
1 POWDER RESPIRATORY (INHALATION) DAILY
Status: DISCONTINUED | OUTPATIENT
Start: 2020-08-07 | End: 2020-08-09 | Stop reason: HOSPADM

## 2020-08-07 RX ORDER — DIPHENHYDRAMINE HCL 25 MG
25 CAPSULE ORAL EVERY 6 HOURS PRN
Status: DISCONTINUED | OUTPATIENT
Start: 2020-08-07 | End: 2020-08-09 | Stop reason: HOSPADM

## 2020-08-07 RX ORDER — TIOTROPIUM BROMIDE 18 UG/1
1 CAPSULE ORAL; RESPIRATORY (INHALATION) DAILY
Status: DISCONTINUED | OUTPATIENT
Start: 2020-08-07 | End: 2020-08-09 | Stop reason: HOSPADM

## 2020-08-07 RX ADMIN — SODIUM CHLORIDE: 0.9 INJECTION, SOLUTION INTRAVENOUS at 04:08

## 2020-08-07 RX ADMIN — Medication 2060 UNITS: at 01:08

## 2020-08-07 RX ADMIN — METRONIDAZOLE 500 MG: 500 INJECTION, SOLUTION INTRAVENOUS at 08:08

## 2020-08-07 RX ADMIN — PANTOPRAZOLE SODIUM 40 MG: 40 INJECTION, POWDER, LYOPHILIZED, FOR SOLUTION INTRAVENOUS at 08:08

## 2020-08-07 RX ADMIN — METRONIDAZOLE 500 MG: 500 INJECTION, SOLUTION INTRAVENOUS at 12:08

## 2020-08-07 RX ADMIN — TIOTROPIUM BROMIDE 18 MCG: 18 CAPSULE ORAL; RESPIRATORY (INHALATION) at 11:08

## 2020-08-07 RX ADMIN — CIPROFLOXACIN 200 MG: 2 INJECTION, SOLUTION INTRAVENOUS at 01:08

## 2020-08-07 RX ADMIN — HYDROMORPHONE HYDROCHLORIDE 0.2 MG: 1 INJECTION, SOLUTION INTRAMUSCULAR; INTRAVENOUS; SUBCUTANEOUS at 01:08

## 2020-08-07 RX ADMIN — DIPHENHYDRAMINE HYDROCHLORIDE 25 MG: 50 INJECTION, SOLUTION INTRAMUSCULAR; INTRAVENOUS at 01:08

## 2020-08-07 RX ADMIN — MONTELUKAST 10 MG: 10 TABLET, FILM COATED ORAL at 08:08

## 2020-08-07 RX ADMIN — FLUTICASONE FUROATE AND VILANTEROL TRIFENATATE 1 PUFF: 100; 25 POWDER RESPIRATORY (INHALATION) at 11:08

## 2020-08-07 RX ADMIN — DIPHENHYDRAMINE HYDROCHLORIDE 25 MG: 25 CAPSULE ORAL at 12:08

## 2020-08-07 RX ADMIN — CIPROFLOXACIN 200 MG: 2 INJECTION, SOLUTION INTRAVENOUS at 12:08

## 2020-08-07 RX ADMIN — AZTREONAM 500 MG: 1 INJECTION, POWDER, LYOPHILIZED, FOR SOLUTION INTRAMUSCULAR; INTRAVENOUS at 02:08

## 2020-08-07 RX ADMIN — IPRATROPIUM BROMIDE AND ALBUTEROL SULFATE 3 ML: .5; 3 SOLUTION RESPIRATORY (INHALATION) at 07:08

## 2020-08-07 RX ADMIN — DIPHENHYDRAMINE HYDROCHLORIDE 25 MG: 25 CAPSULE ORAL at 06:08

## 2020-08-07 RX ADMIN — VANCOMYCIN HYDROCHLORIDE 2000 MG: 100 INJECTION, POWDER, LYOPHILIZED, FOR SOLUTION INTRAVENOUS at 04:08

## 2020-08-07 RX ADMIN — METRONIDAZOLE 500 MG: 500 INJECTION, SOLUTION INTRAVENOUS at 04:08

## 2020-08-07 RX ADMIN — DIPHENHYDRAMINE HYDROCHLORIDE 25 MG: 25 CAPSULE ORAL at 08:08

## 2020-08-07 NOTE — ASSESSMENT & PLAN NOTE
- originally on Xarelto, now on Eliquis  - INR elevated at 3.4   - hold anticoagulation give GI bleeding   - monitor INR in AM

## 2020-08-07 NOTE — HOSPITAL COURSE
Initial wbc was 44.94 .Patient was started on broad spectrum abx with cipro, flagyl, vanc, aztreonam.Aztreonam was dcd.Repeat lactic acid better.Wbc improving and back to normal.Blood cx negative, vancomycin dcd.Abdominal us for mesentric ischemia was limited but did not show evidence for hemodynamically significant stenosis of the celiac artery or superior mesenteric artery based upon peak systolic velocity criteria. did not show Luis improved with ivf.Was likely diverticular bleeding, she received total 3 units of blood with improved and stable h/h.She also received a dose of k centra with active bleeding.she was not having any bright red blood but dark tarry stool .she was feeling a lot better, abdominal pain improved, tolerating diet and discharged home on course of cipro and flagyl.Cdiff was negative.She was told to start back her asa and xarelto in 2-3 days if she does not have any further bleeding.Will follow with cards in 2 weeks.Was told to start on coreg initially half bid and monitor bp and increase accordingly to 1 pill bid..Also to start back bumex and spironolactone 1 at a time for swelling.Home health was arranged on dc.Ua was abnormal and urine cx growing gram negative rods [id number],  but patient not symptomatic.

## 2020-08-07 NOTE — SUBJECTIVE & OBJECTIVE
Past Medical History:   Diagnosis Date    COPD (chronic obstructive pulmonary disease)     Coronary artery disease     Disorder of kidney and ureter     GERD (gastroesophageal reflux disease)     Hypertension     PONV (postoperative nausea and vomiting)        Past Surgical History:   Procedure Laterality Date    Double Bypass      HYSTERECTOMY      INJECTION OF ANESTHETIC AGENT AROUND MEDIAL BRANCH NERVES INNERVATING LUMBAR FACET JOINT Right 3/13/2020    Procedure: BLOCK, NERVE, FACET JOINT, LUMBAR, MEDIAL BRANCH;  Surgeon: Kam Baker MD;  Location: [facility] OR;  Service: Pain Management;  Laterality: Right;  L2,3,4,5    INSERTION OF IMPLANTABLE LOOP RECORDER N/A 3/11/2019    Procedure: Insertion, Implantable Loop Recorder;  Surgeon: Art Marquez MD;  Location: [facility] CATH LAB;  Service: Cardiology;  Laterality: N/A;    INSERTION OF MULTIFOCAL INTRAOCULAR LENS Right 11/20/2019    Procedure: INSERTION, IOL, MULTIFOCAL;  Surgeon: Larry Leal MD;  Location: [facility] OR;  Service: Ophthalmology;  Laterality: Right;    PHACOEMULSIFICATION OF CATARACT Right 11/20/2019    Procedure: PHACOEMULSIFICATION, CATARACT;  Surgeon: Larry Leal MD;  Location: [facility] OR;  Service: Ophthalmology;  Laterality: Right;    TRANSFORAMINAL EPIDURAL INJECTION OF STEROID Right 1/31/2020    Procedure: INJECTION, STEROID, EPIDURAL, TRANSFORAMINAL APPROACH;  Surgeon: Kam Baker MD;  Location: [facility] OR;  Service: Pain Management;  Laterality: Right;  L4, L5       Review of patient's allergies indicates:   Allergen Reactions    Codeine     Iodine and iodide containing products      Patient broke out in hives after last receiving Iodine    Metolazone      Causes dizziness, palpitations, nausea    Omnipaque [iohexol]      Pt states she had rash after returning home from last  Steroid injection, though no rash noted before discharge from PACU    Penicillins     Potassium     Sulfa (sulfonamide  antibiotics)     Sulfone        Current Facility-Administered Medications on File Prior to Encounter   Medication    0.9%  NaCl infusion    [COMPLETED] albuterol-ipratropium 2.5 mg-0.5 mg/3 mL nebulizer solution 3 mL    [COMPLETED] ondansetron injection 4 mg    [COMPLETED] sodium chloride 0.9% bolus 1,000 mL    [COMPLETED] sodium chloride 0.9% bolus 500 mL    sodium chloride 0.9% flush 10 mL    [DISCONTINUED] 0.9%  NaCl infusion (for blood administration)    [DISCONTINUED] 0.9%  NaCl infusion (for blood administration)    [DISCONTINUED] 0.9%  NaCl infusion (for blood administration)    [COMPLETED] sodium chloride 0.9% bolus 1,000 mL     Current Outpatient Medications on File Prior to Encounter   Medication Sig    albuterol (ACCUNEB) 1.25 mg/3 mL Nebu Inhale 1 ampule into the lungs. 4 times PRN    albuterol (PROVENTIL/VENTOLIN HFA) 90 mcg/actuation inhaler Inhale 2 puffs into the lungs every 6 (six) hours as needed for Wheezing. Rescue    allopurinol (ZYLOPRIM) 100 MG tablet TAKE 1 TABLET BY MOUTH ONE TIME DAILY    alprazolam (XANAX) 0.25 MG tablet Take 0.25 mg by mouth daily as needed for Anxiety.    aspirin 81 MG Chew Take 81 mg by mouth once daily.    bumetanide (BUMEX) 1 MG tablet Take 1 tablet (1 mg total) by mouth 2 (two) times daily.    buPROPion (WELLBUTRIN XL) 150 MG TB24 tablet 150 mg once daily.     carvedilol (COREG) 12.5 MG tablet Take 12.5 mg by mouth 2 (two) times daily.    ELIQUIS 5 mg Tab Take 5 mg by mouth 4 (four) times daily. 1 table four times daily for the first 7 days, then 1 twice daily    fluticasone-umeclidin-vilanter (TRELEGY ELLIPTA) 100-62.5-25 mcg DsDv Inhale 1 puff into the lungs once daily.    gabapentin (NEURONTIN) 300 MG capsule Take 300 mg by mouth 2 (two) times daily.    magnesium 250 mg Tab Take 250 mg by mouth once daily.    montelukast (SINGULAIR) 10 mg tablet 10 mg nightly.    omeprazole (PRILOSEC) 40 MG capsule Take 1 capsule (40 mg total) by mouth  once daily.    spironolactone (ALDACTONE) 25 MG tablet Take 1 tablet (25 mg total) by mouth once daily.    traMADoL (ULTRAM) 50 mg tablet Take 50 mg by mouth daily as needed.     Family History     Problem Relation (Age of Onset)    Cancer Mother, Sister, Maternal Uncle, Paternal Uncle    Dementia Maternal Aunt, Maternal Grandmother    Heart disease Brother    Hypertension Paternal Aunt    Stroke Mother, Father        Tobacco Use    Smoking status: Former Smoker     Types: Cigarettes    Smokeless tobacco: Never Used   Substance and Sexual Activity    Alcohol use: No    Drug use: No    Sexual activity: Not on file     Review of Systems   Constitutional: Negative for chills, diaphoresis and fever.   Respiratory: Negative for cough, shortness of breath and wheezing.    Cardiovascular: Negative for chest pain, palpitations and leg swelling.   Gastrointestinal: Positive for abdominal pain, blood in stool, diarrhea and nausea. Negative for abdominal distention, constipation and vomiting.   Genitourinary: Negative for dysuria, flank pain, frequency, hematuria and urgency.   Skin: Negative for color change and pallor.   Neurological: Positive for dizziness. Negative for syncope, weakness, light-headedness, numbness and headaches.   Psychiatric/Behavioral: Negative for confusion and decreased concentration.     Objective:     Vital Signs (Most Recent):  Temp: 98.5 °F (36.9 °C) (08/06/20 2305)  Pulse: (!) 116 (08/06/20 2305)  Resp: 16 (08/06/20 2345)  BP: 118/64 (08/06/20 2305)  SpO2: 97 % (08/06/20 2305) Vital Signs (24h Range):  Temp:  [97.6 °F (36.4 °C)-98.5 °F (36.9 °C)] 98.5 °F (36.9 °C)  Pulse:  [] 116  Resp:  [14-18] 16  SpO2:  [97 %-100 %] 97 %  BP: ()/(58-95) 118/64     Weight: 82.4 kg (181 lb 10.5 oz)  Body mass index is 32.18 kg/m².    Physical Exam  Vitals signs and nursing note reviewed.   Constitutional:       General: She is not in acute distress.     Appearance: She is well-developed. She is  obese. She is not ill-appearing or diaphoretic.   HENT:      Head: Normocephalic and atraumatic.   Eyes:      General: No scleral icterus.     Conjunctiva/sclera: Conjunctivae normal.      Pupils: Pupils are equal, round, and reactive to light.   Neck:      Musculoskeletal: Normal range of motion and neck supple.      Trachea: No tracheal deviation.   Cardiovascular:      Rate and Rhythm: Normal rate and regular rhythm.      Heart sounds: Normal heart sounds. No murmur. No friction rub. No gallop.    Pulmonary:      Effort: Pulmonary effort is normal. No respiratory distress.      Breath sounds: Normal breath sounds. No stridor. No wheezing or rales.   Abdominal:      General: Bowel sounds are normal. There is no distension.      Palpations: Abdomen is soft. There is no mass.      Tenderness: There is no abdominal tenderness. There is no guarding.   Musculoskeletal: Normal range of motion.         General: No deformity.   Skin:     General: Skin is warm and dry.      Coloration: Skin is not pale.   Neurological:      General: No focal deficit present.      Mental Status: She is alert and oriented to person, place, and time.      Motor: No abnormal muscle tone.   Psychiatric:         Behavior: Behavior normal.         Thought Content: Thought content normal.         Judgment: Judgment normal.           CRANIAL NERVES     CN III, IV, VI   Pupils are equal, round, and reactive to light.       Significant Labs:   BMP:   Recent Labs   Lab 08/06/20  1449   *      K 4.3      CO2 23   BUN 38*   CREATININE 1.57*   CALCIUM 9.1     CBC:   Recent Labs   Lab 08/06/20  1449 08/06/20  1727 08/06/20  2251   WBC 11.63  --  44.94*   HGB 9.9* 9.7* 9.6*   HCT 31.7*  --  30.8*   *  --  337     CMP:   Recent Labs   Lab 08/06/20  1449      K 4.3      CO2 23   *   BUN 38*   CREATININE 1.57*   CALCIUM 9.1   PROT 6.9   ALBUMIN 4.1   BILITOT 0.4   ALKPHOS 90   AST 21   ALT 16   ANIONGAP 11    EGFRNONAA 32.9*     Urine Culture: No results for input(s): LABURIN in the last 48 hours.  Urine Studies: No results for input(s): COLORU, APPEARANCEUA, PHUR, SPECGRAV, PROTEINUA, GLUCUA, KETONESU, BILIRUBINUA, OCCULTUA, NITRITE, UROBILINOGEN, LEUKOCYTESUR, RBCUA, WBCUA, BACTERIA, SQUAMEPITHEL, HYALINECASTS in the last 48 hours.    Invalid input(s): WRIGHTSUR  All pertinent labs within the past 24 hours have been reviewed.    Significant Imaging: I have reviewed all pertinent imaging results/findings within the past 24 hours.

## 2020-08-07 NOTE — EICU
Brief eICU new admit Note:  70 yr olod female with hx of COPD, GERD, CKD, CAD recently switched from xarelto to eliquis, recent iliac stent 3 weeks ago, presented to [facility] ED with CC: fresh blood per rectum. Transferred to [facility] for further care for GI consultation. 2 prbc.     Data:  From [facility] reviewed.  LA up to 4.4 from 2.6. received 2.5 L bolus.  Wbc 11.6, Hg stable at 9.7, INR 3.4, Covid neg. Cr 1.5. LFT normal. CT abdomen: diverticulosis/cholelithiasis.   ECHO: EF 55%, diastolic mild. Dysfunction.   Cxr: clear. CABG wire    Camera assessment:  On room air, talking to bed side RN. Looks comfortable.  , 119/58, 97% on room air. Getting PRBC. Alert and oriented.       Clinical Impression:    1. GI bleeding. On eliquis. elevated INR. Stable Hg. S/p fluids and PRBC.  - follow Hg/hct stable.  GI consultation  - Protonix iv.  - continued rectal bleeding. Consider 1. 3 rd PRBC.   - aspiration precautions.  - ? Ischemic bowel. Get mesenteric bowel study and surgical consult ( WBC > 40), no colitis on CT abdomen.   - consider K centra if hg dropping or hemodynamically unstable to reverse eliquis.     2. Elevated LA. Mild leukocytosis, no fever. CT abdomen no itis.  Acute on CKD. No definitive source so far. No pneumonia. Ischemic bowel? But on AC -eliquis, since yesterday on back on xarelto. PAD-iliac stent.   - UA.  - Covid neg.  - s/p 30 cc/kg fluids. Follow LA improving. On fluids. Re transfuse.  - empirical Vanc/aztronam/cipro-renal dose/flagyl. Get Pro ubaldo/amylase/LDH  - Mesenteric isch  bowl study. If abnormal-Gen surgery consultation tonight.   - keep MAP > 65.   - unlikely cl difficile.     3.COPD  - stable    4. CAD/iliac vein stenting/HTN/CAD. hold asa.     5. VTE: elevated INR. SCD from am.     6. CKD.      Above discussed with NEGIN Sevilla.     12:52 AM  NP called back. Having 4 th fresh bloody rectal movement, sinus tach. Getting her 3rd PRBC being pushed. MAP ok.  4 th PRBC if, Hg drops  on follow up Hg at 3 am.    Will give stat K centra for eliquis reversal for now. Has recent iliac stent. benefit overweighs due to active LGI bleeding.     6:24  Face itching, no rash or hives as per bed side RN.  Mesenteric study sub optimal, but no major stenosis of arteries.  - Benadryl 25 mg oral once. On room air.  Consider Gen surgery consult in am if not better.   - ? UTI. But squamous epithelial > 10. On bax. Wbc has down to 25K. Stable Hg and up to 9.8.

## 2020-08-07 NOTE — CONSULTS
Consult Note  Nephrology    Consult Requested By: Ann Malone MD  Reason for Consult: ANDRAE    SUBJECTIVE:     History of Present Illness:  Patient is a 70 y.o. female presents with transfer from [facility] for GI evaluation for LGIB.  Extensive medical hx including CKD III (baseline creat 1.5) and closely followed by Dr. Josefina Noyola.  Recently had illiac stenting for mesenteric ischemia and was placed on eliquis.  Had issues with it and changed to xarelto.  Took 2 pills and started with bright red blood in stool.  Presented to [facility] and had ~4 bloody BM's.  Transfused 2 units PRBC's and brought to [facility] for GI eval.  Consulted for mild ANDRAE with Creat 1.8.  Pt seen and examined.  Feels ok this am.  Discussed with team at bedside.  VSS.  Epic reviewed in detail.    C/o mild abd pain but no CP/SOB/N/V/D/F/C.      Past Medical History:   Diagnosis Date    CKD (chronic kidney disease), stage III     COPD (chronic obstructive pulmonary disease)     Coronary artery disease     Disorder of kidney and ureter     GERD (gastroesophageal reflux disease)     Hypertension     PONV (postoperative nausea and vomiting)      Past Surgical History:   Procedure Laterality Date    Double Bypass      HYSTERECTOMY      INJECTION OF ANESTHETIC AGENT AROUND MEDIAL BRANCH NERVES INNERVATING LUMBAR FACET JOINT Right 3/13/2020    Procedure: BLOCK, NERVE, FACET JOINT, LUMBAR, MEDIAL BRANCH;  Surgeon: Kam Baker MD;  Location: [facility] OR;  Service: Pain Management;  Laterality: Right;  L2,3,4,5    INSERTION OF IMPLANTABLE LOOP RECORDER N/A 3/11/2019    Procedure: Insertion, Implantable Loop Recorder;  Surgeon: Art Marquez MD;  Location: [facility] CATH LAB;  Service: Cardiology;  Laterality: N/A;    INSERTION OF MULTIFOCAL INTRAOCULAR LENS Right 11/20/2019    Procedure: INSERTION, IOL, MULTIFOCAL;  Surgeon: Larry Leal MD;  Location: [facility] OR;  Service: Ophthalmology;  Laterality: Right;     PHACOEMULSIFICATION OF CATARACT Right 11/20/2019    Procedure: PHACOEMULSIFICATION, CATARACT;  Surgeon: Larry Leal MD;  Location: [facility] OR;  Service: Ophthalmology;  Laterality: Right;    TRANSFORAMINAL EPIDURAL INJECTION OF STEROID Right 1/31/2020    Procedure: INJECTION, STEROID, EPIDURAL, TRANSFORAMINAL APPROACH;  Surgeon: Kam Baker MD;  Location: [facility] OR;  Service: Pain Management;  Laterality: Right;  L4, L5     Family History   Problem Relation Age of Onset    Cancer Mother     Stroke Mother     Stroke Father     Cancer Sister     Heart disease Brother     Dementia Maternal Aunt     Cancer Maternal Uncle     Hypertension Paternal Aunt     Cancer Paternal Uncle     Dementia Maternal Grandmother      Social History     Tobacco Use    Smoking status: Former Smoker     Types: Cigarettes    Smokeless tobacco: Never Used   Substance Use Topics    Alcohol use: No    Drug use: No       Review of patient's allergies indicates:   Allergen Reactions    Codeine     Iodine and iodide containing products      Patient broke out in hives after last receiving Iodine    Metolazone      Causes dizziness, palpitations, nausea    Omnipaque [iohexol]      Pt states she had rash after returning home from last  Steroid injection, though no rash noted before discharge from PACU    Penicillins     Potassium     Sulfa (sulfonamide antibiotics)     Sulfone         Review of Systems:  Constitutional: No fever or chills  Respiratory: No cough or shortness of breath  Cardiovascular: No chest pain or palpitations  Gastrointestinal: No nausea or vomiting, mild pain.   Neurological: No confusion or weakness    OBJECTIVE:     Vital Signs (Most Recent)  Temp: 98.2 °F (36.8 °C) (08/07/20 0715)  Pulse: 94 (08/07/20 0830)  Resp: (!) 21 (08/07/20 0830)  BP: 123/72 (08/07/20 0830)  SpO2: 100 % (08/07/20 0830)    Vital Signs Range (Last 24H):  Temp:  [97 °F (36.1 °C)-98.5 °F (36.9 °C)]   Pulse:  []    Resp:  [12-37]   BP: ()/(55-98)   SpO2:  [96 %-100 %]       Intake/Output Summary (Last 24 hours) at 8/7/2020 0905  Last data filed at 8/7/2020 0720  Gross per 24 hour   Intake 1600 ml   Output 425 ml   Net 1175 ml       Physical Exam:  General appearance: Well developed, well nourished  Eyes:  Conjunctivae/corneas clear. PERRL.  Lungs: Normal respiratory effort,   clear to auscultation bilaterally   Heart: Regular rate and rhythm, S1, S2 normal, no murmur, rub or kp.  Abdomen: Soft, semi-tender non-distended; bowel sounds normal; no masses,  no organomegaly, obese.  Extremities: No cyanosis or clubbing. No edema.    Skin: Skin color, texture, turgor normal. No rashes or lesions.  Bruising to right leg from angio  Neurologic: Normal strength and tone. No focal numbness or weakness   Pur-wic      Laboratory:  Recent Labs   Lab 08/07/20  0639   WBC 15.83*   RBC 3.33*   HGB 8.7*   HCT 27.7*      MCV 83   MCH 26.1*   MCHC 31.4*     BMP:   Recent Labs   Lab 08/07/20  0234   *      K 4.9      CO2 19*   BUN 39*   CREATININE 1.8*   CALCIUM 8.2*     Lab Results   Component Value Date    CALCIUM 8.2 (L) 08/07/2020    PHOS 4.1 06/23/2020     BNP  No results for input(s): BNP, BNPTRIAGEBLO in the last 168 hours.  Lab Results   Component Value Date    URICACID 7.2 (H) 06/23/2020     Lab Results   Component Value Date    IRON 103 02/18/2019    TIBC 371 02/18/2019    FERRITIN 91 02/18/2019     Lab Results   Component Value Date    PTH 22.0 01/06/2020    CALCIUM 8.2 (L) 08/07/2020    PHOS 4.1 06/23/2020       Diagnostic Results:  US Mesenteric Ischemia Study (xpd)   Final Result      The examination is limited as discussed above however there is no sonographic evidence for hemodynamically significant stenosis of the celiac artery or superior mesenteric artery based upon peak systolic velocity criteria.         Electronically signed by: Chang Reid   Date:    08/07/2020   Time:    02:24       X-Ray Chest AP Portable   Final Result      No acute abnormality.         Electronically signed by: Kam Paredes   Date:    08/06/2020   Time:    21:35          ASSESSMENT/PLAN:     1. Mild oliguric ANDRAE on advanced CKD III (baseline 1.5) secondary to ABLA/hypotension (N18.3, N17.9, K92.2):  Creat 1.8 and hopefully will improve to baseline after blood administration and increased BP.  Extensive hx of renal disease and followed by Dr. Noyola.  Only 1 functional malformed right kidney and left kidney atrophy.  Aggressive w/up with negative serologic findings except mild COLLEEN+ but negative reflex.  Also hx of stone and encouraged adequate hydration at home.  Had mild ANDRAE in June with hypercalcemia.  Hx of extensive NSAIDs usage. Hold ACE/ARB for now.  Follow RFP and maintain volume status.  Renally dose meds, avoid nephrotoxins, and monitor I/O's closely.  2. Lower GI Bleed from OAC:  GI following.  Transfuse PRN.  3. Mild NAGMA:  Follow trends.   4. Mesenteric ischemia with recent stenting:  Defer.       Thanks for consult  See above  Will follow along.

## 2020-08-07 NOTE — PLAN OF CARE
(Physician in Lead of Transfers)   Outside Transfer Acceptance Note / [facility]    Transferring Physician: Fernanda Tucker MD/Emergency Medicine    Accepting Physician: TAMAR Tamez MD    Date of Acceptance: 08/06/2020    Transferring Facility: [facility]    Destination Facility and Admitting Physician: [facility] ICU//Timo Hercules MD//Hospital Medicine    Reason for Transfer: Higher level of care, Gastroenterology evaluation of GI bleed    Report from Transferring Physician/Hospital course: . Patient is a 70-year-old female with past medical history of COPD, CAD, GERD, CKD, and hypertension who presents with blood per rectum that started this morning.  Patient reports she had bright red blood with diarrhea this morning. She had multiple episodes of bloody bowel movements.   She reports diffuse abdominal pain, though on examination her abdomen was soft with mild diffuse tenderness and no guarding or rebound noted. She was recently switched from Xarelto to Eliquis.  PT/INR was elevated on labs.  The ER physician spoke with her interventional cardiologist (Dr. Marquez) and reported that the patient had stenting of the iliac veins 3 weeks ago (no mesenteric vessel interventions by report).  In the emergency room, noncontrast CT of the abdomen and pelvis showed unremarkable lung bases, diverticulosis coli and cholelithiasis.  Hemoglobin was 9.9 (recent hemoglobin over the last 6 months has ranged 12.4-15.5).  Repeat hemoglobin was 9.7 approximately 3 hr later.  Initial lactic acid was 2.6, and repeat was 4.4.  She is afebrile, but her blood pressure has trended down to 95/67.  The ER physician ordered packed red blood cells to be transfused.  In the emergency room she also received 2.5 L normal saline.  With the elevated lactic acid (though she was afebrile), she had blood cultures ordered, and urinalysis with reflex culture ordered.  COVID screening was negative.  She is being  transferred to [facility] ICU for further treatment of her GI bleeding and lactic acidosis along with gastroenterology evaluation for the GI bleeding.  By report she is on oral Eliquis at home.  Case discussed with the on-call for Gastroenterology at [facility].    VS:  temperature 97.9°, heart rate 109, respirations 16, blood pressure 95/67, oxygen saturation 100% on room air    Labs:  White blood cell count 11.6, hemoglobin 9.9, hematocrit 31.7, platelet count 433, repeat hemoglobin 9.7, PT 37.2, INR 3.4, sodium 137, potassium 4.3, CO2 23, BUN 38, creatinine 1.5, glucose 137, albumin 4.1, AST 21, ALT 16, initial lactic acid 2.6, repeat lactic acid 4.4, COVID swab was negative    Radiographs: see above    To Do List: Admit to , recheck H/H along with lactic acid and PT/PTT, gastroenterology evaluation, possible General surgery evaluation    Upon patient arrival to the ICU, please contact Hospital Medicine on call.     TAMAR Tamez MD  Hospital Medicine Staff  Cell: [phone]

## 2020-08-07 NOTE — NURSING
Pt complaining of itching while getting ciprofloxacin iv. Talked to john, will give 25mg benadryl iv once.

## 2020-08-07 NOTE — PROGRESS NOTES
Pharmacokinetic Initial Assessment: IV Vancomycin    Assessment/Plan:    Initiate intravenous vancomycin with loading dose of 2000 mg once with subsequent doses when random concentrations are less than 20 mcg/mL  Desired empiric serum trough concentration is 10 to 20 mcg/mL  Draw vancomycin random level on 8/8/20 at 0430.  Pharmacy will continue to follow and monitor vancomycin.      Please contact pharmacy at extension [phone] with any questions regarding this assessment.     Thank you for the consult,   Lavell Browning       Patient brief summary:  Caryn Rodriguez is a 70 y.o. female initiated on antimicrobial therapy with IV Vancomycin for treatment of suspected bacteremia    Drug Allergies:   Review of patient's allergies indicates:   Allergen Reactions    Codeine     Iodine and iodide containing products      Patient broke out in hives after last receiving Iodine    Metolazone      Causes dizziness, palpitations, nausea    Omnipaque [iohexol]      Pt states she had rash after returning home from last  Steroid injection, though no rash noted before discharge from PACU    Penicillins     Potassium     Sulfa (sulfonamide antibiotics)     Sulfone        Actual Body Weight:   82.4 kg    Renal Function:   Estimated Creatinine Clearance: 33.4 mL/min (A) (based on SCr of 1.57 mg/dL (H)).,     Dialysis Method (if applicable):  N/A    CBC (last 72 hours):  Recent Labs   Lab Result Units 08/06/20  1449 08/06/20  1727 08/06/20  2251   WBC K/uL 11.63  --  44.94*   Hemoglobin g/dL 9.9* 9.7* 9.6*   Hematocrit % 31.7*  --  30.8*   Platelets K/uL 433*  --  337   Gran% % 74.3*  --  88.0*   Lymph% % 16.6*  --  9.0*   Mono% % 7.4  --  3.0*   Eosinophil% % 0.9  --  0.0   Basophil% % 0.3  --  0.0   Differential Method  Automated  --  Manual       Metabolic Panel (last 72 hours):  Recent Labs   Lab Result Units 08/06/20  1449 08/07/20  0001   Sodium mmol/L 137  --    Sodium Urine Random mmol/L  --  25   Potassium mmol/L 4.3  --     Chloride mmol/L 103  --    CO2 mmol/L 23  --    Glucose mg/dL 137*  --    Glucose, UA   --  Negative   BUN, Bld mg/dL 38*  --    Creatinine mg/dL 1.57*  --    Creatinine, Random Ur mg/dL  --  116.4   Albumin g/dL 4.1  --    Total Bilirubin mg/dL 0.4  --    Alkaline Phosphatase U/L 90  --    AST U/L 21  --    ALT U/L 16  --        Drug levels (last 3 results):  No results for input(s): VANCOMYCINRA, VANCOMYCINPE, VANCOMYCINTR in the last 72 hours.    Microbiologic Results:  Microbiology Results (last 7 days)       Procedure Component Value Units Date/Time    Urine culture [[id number]] Collected: 08/07/20 0001    Order Status: No result Specimen: Urine Updated: 08/07/20 0033    Blood culture [[id number]] Collected: 08/07/20 0014    Order Status: Sent Specimen: Blood from Antecubital, Left Arm Updated: 08/07/20 0015    Blood culture [[id number]] Collected: 08/07/20 0014    Order Status: Sent Specimen: Blood Updated: 08/07/20 0015    Clostridium difficile EIA [[id number]]     Order Status: No result Specimen: Stool

## 2020-08-07 NOTE — ASSESSMENT & PLAN NOTE
? Chronic  Patient had it before procedure and does not feel like improved much  - ct without contrast unremarkable

## 2020-08-07 NOTE — ASSESSMENT & PLAN NOTE
- last lipid panel:   Results for SOCORRO BALLARD (MRN [id number]) as of 8/6/2020 20:41   Ref. Range 2/18/2019 10:20   Cholesterol Latest Ref Range: 120 - 199 mg/dL 136   HDL Latest Ref Range: 40 - 75 mg/dL 43   Hdl/Cholesterol Ratio Latest Ref Range: 20.0 - 50.0 % 31.6   LDL Cholesterol External Latest Ref Range: 63.0 - 159.0 mg/dL 57.4 (L)   Non HDL Chol. (LDL+VLDL) Unknown    Non-HDL Cholesterol Latest Units: mg/dL 93   Total Cholesterol/HDL Ratio Latest Ref Range: 2.0 - 5.0  3.2   Triglycerides Latest Ref Range: 30 - 150 mg/dL 178 (H)      do not see statin on home list

## 2020-08-07 NOTE — H&P
[facility] Hospital Medicine  History & Physical    Patient Name: Caryn Rodriguez  MRN: [id number]  Admission Date: 8/6/2020  Attending Physician: Timo Hercules MD   Primary Care Provider: Sterling Wellington MD         Patient information was obtained from patient, past medical records and ER records.     Subjective:     Principal Problem:GI bleed    Chief Complaint: No chief complaint on file.       HPI: Ms. Caryn Rodriguez is a 70 y.o. female, with PMH of CAD, Mesenteric ischemia (s/p iliac vein stenting 3 weeks ago, on Eliquis), COPD, HTN, GERD, who presented to [facility] on 8/6/20 after a bloody BM that morning. She described the BM as bright red blood in diarrhea, which occurred multiple times. This is associated with diffuse abdominal pain. She states that she first started with abdominal pain when she started taking Eliquis 3 weeks ago. It has been associated with nausea and diarrhea. She states she had an angiogram on 7/17/20 and had stents placed. Initially she continued on the Eliquis, but the abdominal pain has become to much to take it, and she was changed to Xarelto two days aog. Her last dose of Eliquis was 8/5/20 when she started taking Xarelto, she has taken two doses of Xarelto since beginning this medication. Initial HGB in ED was 9.9, and upon repeat was 9.7. After another bloody BM in the ED, PRBC transfusion was initiated. Lactic Acid francsica from 2.6 to 4.4 while in the ED. Blood cultures pending, CXR and UA ordered upon admission. She was admitted to inpatient status to the ICU.      Past Medical History:   Diagnosis Date    COPD (chronic obstructive pulmonary disease)     Coronary artery disease     Disorder of kidney and ureter     GERD (gastroesophageal reflux disease)     Hypertension     PONV (postoperative nausea and vomiting)        Past Surgical History:   Procedure Laterality Date    Double Bypass      HYSTERECTOMY      INJECTION OF ANESTHETIC AGENT AROUND  MEDIAL BRANCH NERVES INNERVATING LUMBAR FACET JOINT Right 3/13/2020    Procedure: BLOCK, NERVE, FACET JOINT, LUMBAR, MEDIAL BRANCH;  Surgeon: Kam Baker MD;  Location: [facility] OR;  Service: Pain Management;  Laterality: Right;  L2,3,4,5    INSERTION OF IMPLANTABLE LOOP RECORDER N/A 3/11/2019    Procedure: Insertion, Implantable Loop Recorder;  Surgeon: Art Marquez MD;  Location: [facility] CATH LAB;  Service: Cardiology;  Laterality: N/A;    INSERTION OF MULTIFOCAL INTRAOCULAR LENS Right 11/20/2019    Procedure: INSERTION, IOL, MULTIFOCAL;  Surgeon: Larry Leal MD;  Location: [facility] OR;  Service: Ophthalmology;  Laterality: Right;    PHACOEMULSIFICATION OF CATARACT Right 11/20/2019    Procedure: PHACOEMULSIFICATION, CATARACT;  Surgeon: Larry Leal MD;  Location: [facility] OR;  Service: Ophthalmology;  Laterality: Right;    TRANSFORAMINAL EPIDURAL INJECTION OF STEROID Right 1/31/2020    Procedure: INJECTION, STEROID, EPIDURAL, TRANSFORAMINAL APPROACH;  Surgeon: Kam Baker MD;  Location: [facility] OR;  Service: Pain Management;  Laterality: Right;  L4, L5       Review of patient's allergies indicates:   Allergen Reactions    Codeine     Iodine and iodide containing products      Patient broke out in hives after last receiving Iodine    Metolazone      Causes dizziness, palpitations, nausea    Omnipaque [iohexol]      Pt states she had rash after returning home from last  Steroid injection, though no rash noted before discharge from PACU    Penicillins     Potassium     Sulfa (sulfonamide antibiotics)     Sulfone        Current Facility-Administered Medications on File Prior to Encounter   Medication    0.9%  NaCl infusion    [COMPLETED] albuterol-ipratropium 2.5 mg-0.5 mg/3 mL nebulizer solution 3 mL    [COMPLETED] ondansetron injection 4 mg    [COMPLETED] sodium chloride 0.9% bolus 1,000 mL    [COMPLETED] sodium chloride 0.9% bolus 500 mL    sodium chloride 0.9% flush 10  mL    [DISCONTINUED] 0.9%  NaCl infusion (for blood administration)    [DISCONTINUED] 0.9%  NaCl infusion (for blood administration)    [DISCONTINUED] 0.9%  NaCl infusion (for blood administration)    [COMPLETED] sodium chloride 0.9% bolus 1,000 mL     Current Outpatient Medications on File Prior to Encounter   Medication Sig    albuterol (ACCUNEB) 1.25 mg/3 mL Nebu Inhale 1 ampule into the lungs. 4 times PRN    albuterol (PROVENTIL/VENTOLIN HFA) 90 mcg/actuation inhaler Inhale 2 puffs into the lungs every 6 (six) hours as needed for Wheezing. Rescue    allopurinol (ZYLOPRIM) 100 MG tablet TAKE 1 TABLET BY MOUTH ONE TIME DAILY    alprazolam (XANAX) 0.25 MG tablet Take 0.25 mg by mouth daily as needed for Anxiety.    aspirin 81 MG Chew Take 81 mg by mouth once daily.    bumetanide (BUMEX) 1 MG tablet Take 1 tablet (1 mg total) by mouth 2 (two) times daily.    buPROPion (WELLBUTRIN XL) 150 MG TB24 tablet 150 mg once daily.     carvedilol (COREG) 12.5 MG tablet Take 12.5 mg by mouth 2 (two) times daily.    ELIQUIS 5 mg Tab Take 5 mg by mouth 4 (four) times daily. 1 table four times daily for the first 7 days, then 1 twice daily    fluticasone-umeclidin-vilanter (TRELEGY ELLIPTA) 100-62.5-25 mcg DsDv Inhale 1 puff into the lungs once daily.    gabapentin (NEURONTIN) 300 MG capsule Take 300 mg by mouth 2 (two) times daily.    magnesium 250 mg Tab Take 250 mg by mouth once daily.    montelukast (SINGULAIR) 10 mg tablet 10 mg nightly.    omeprazole (PRILOSEC) 40 MG capsule Take 1 capsule (40 mg total) by mouth once daily.    spironolactone (ALDACTONE) 25 MG tablet Take 1 tablet (25 mg total) by mouth once daily.    traMADoL (ULTRAM) 50 mg tablet Take 50 mg by mouth daily as needed.     Family History     Problem Relation (Age of Onset)    Cancer Mother, Sister, Maternal Uncle, Paternal Uncle    Dementia Maternal Aunt, Maternal Grandmother    Heart disease Brother    Hypertension Paternal Aunt    Stroke  Mother, Father        Tobacco Use    Smoking status: Former Smoker     Types: Cigarettes    Smokeless tobacco: Never Used   Substance and Sexual Activity    Alcohol use: No    Drug use: No    Sexual activity: Not on file     Review of Systems   Constitutional: Negative for chills, diaphoresis and fever.   Respiratory: Negative for cough, shortness of breath and wheezing.    Cardiovascular: Negative for chest pain, palpitations and leg swelling.   Gastrointestinal: Positive for abdominal pain, blood in stool, diarrhea and nausea. Negative for abdominal distention, constipation and vomiting.   Genitourinary: Negative for dysuria, flank pain, frequency, hematuria and urgency.   Skin: Negative for color change and pallor.   Neurological: Positive for dizziness. Negative for syncope, weakness, light-headedness, numbness and headaches.   Psychiatric/Behavioral: Negative for confusion and decreased concentration.     Objective:     Vital Signs (Most Recent):  Temp: 98.5 °F (36.9 °C) (08/06/20 2305)  Pulse: (!) 116 (08/06/20 2305)  Resp: 16 (08/06/20 2345)  BP: 118/64 (08/06/20 2305)  SpO2: 97 % (08/06/20 2305) Vital Signs (24h Range):  Temp:  [97.6 °F (36.4 °C)-98.5 °F (36.9 °C)] 98.5 °F (36.9 °C)  Pulse:  [] 116  Resp:  [14-18] 16  SpO2:  [97 %-100 %] 97 %  BP: ()/(58-95) 118/64     Weight: 82.4 kg (181 lb 10.5 oz)  Body mass index is 32.18 kg/m².    Physical Exam  Vitals signs and nursing note reviewed.   Constitutional:       General: She is not in acute distress.     Appearance: She is well-developed. She is obese. She is not ill-appearing or diaphoretic.   HENT:      Head: Normocephalic and atraumatic.   Eyes:      General: No scleral icterus.     Conjunctiva/sclera: Conjunctivae normal.      Pupils: Pupils are equal, round, and reactive to light.   Neck:      Musculoskeletal: Normal range of motion and neck supple.      Trachea: No tracheal deviation.   Cardiovascular:      Rate and Rhythm: Normal  rate and regular rhythm.      Heart sounds: Normal heart sounds. No murmur. No friction rub. No gallop.    Pulmonary:      Effort: Pulmonary effort is normal. No respiratory distress.      Breath sounds: Normal breath sounds. No stridor. No wheezing or rales.   Abdominal:      General: Bowel sounds are normal. There is no distension.      Palpations: Abdomen is soft. There is no mass.      Tenderness: There is no abdominal tenderness. There is no guarding.   Musculoskeletal: Normal range of motion.         General: No deformity.   Skin:     General: Skin is warm and dry.      Coloration: Skin is not pale.   Neurological:      General: No focal deficit present.      Mental Status: She is alert and oriented to person, place, and time.      Motor: No abnormal muscle tone.   Psychiatric:         Behavior: Behavior normal.         Thought Content: Thought content normal.         Judgment: Judgment normal.           CRANIAL NERVES     CN III, IV, VI   Pupils are equal, round, and reactive to light.       Significant Labs:   BMP:   Recent Labs   Lab 08/06/20  1449   *      K 4.3      CO2 23   BUN 38*   CREATININE 1.57*   CALCIUM 9.1     CBC:   Recent Labs   Lab 08/06/20  1449 08/06/20  1727 08/06/20  2251   WBC 11.63  --  44.94*   HGB 9.9* 9.7* 9.6*   HCT 31.7*  --  30.8*   *  --  337     CMP:   Recent Labs   Lab 08/06/20  1449      K 4.3      CO2 23   *   BUN 38*   CREATININE 1.57*   CALCIUM 9.1   PROT 6.9   ALBUMIN 4.1   BILITOT 0.4   ALKPHOS 90   AST 21   ALT 16   ANIONGAP 11   EGFRNONAA 32.9*     Urine Culture: No results for input(s): LABURIN in the last 48 hours.  Urine Studies: No results for input(s): COLORU, APPEARANCEUA, PHUR, SPECGRAV, PROTEINUA, GLUCUA, KETONESU, BILIRUBINUA, OCCULTUA, NITRITE, UROBILINOGEN, LEUKOCYTESUR, RBCUA, WBCUA, BACTERIA, SQUAMEPITHEL, HYALINECASTS in the last 48 hours.    Invalid input(s): WRIGHTSUR  All pertinent labs within the past 24  "hours have been reviewed.    Significant Imaging: I have reviewed all pertinent imaging results/findings within the past 24 hours.          Assessment/Plan:     * GI bleed  - Ms. Caryn Rodriguez presented with BRBPR   - H&H have been stable  - s/p 1 unit PRBCs  - GI bleed pathways initiated   - CT in ED showed diverticulosis, no diverticulitis   - GI consulted   - montior & transfuse PRN  - protonix ordered   - given concern for mesenteric ischemia to following were ordered:   LDH, amylase, d-dimer, US mesentery, Surgery Consult    - patient started on broad spectrum antibiotics       Lactic acidosis  - recently diagnosed mesenteric ischemia   - s/p iliac vein stenting 3 weeks ago  - originally on Xarelto, now on Eliquis   - repeat lactic acid upon arrival  - continue to hydrate    Supratherapeutic INR  - originally on Xarelto, now on Eliquis  - INR elevated at 3.4   - hold anticoagulation give GI bleeding   - monitor INR in AM     UTI   - already on broad spectrum antibiotics   - pending UC   - last UC w/ sensitivity to vanc (8/2017) which patient is on at present       Acute renal failure superimposed on stage 3 chronic kidney disease  - suspect 2/2 volume losses   - urine studies ordered   - avoid nephrotoxins, renally dose meds   - patient states "I only have on functioning kidney, the other is deformed"   - follows w/ Dr. Destinee Noyola     Carotid artery disease  - holding ASA 2/2 gi bleeding       Essential hypertension  - BP reported low at end of ED stay, and en route, normotensive now   - hold home meds: carvedilol 12.5 mg BID   - monitor       Chronic diastolic heart failure  - last EFCHO 8/31/17:  CONCLUSIONS     1 - Normal left ventricular systolic function (EF 55-60%).     2 - Impaired LV relaxation, normal LAP (grade 1 diastolic dysfunction).     3 - Normal right ventricular systolic function .     4 - Mild mitral regurgitation.     5 - Mild tricuspid regurgitation.   - monitor I&O, daily weights   - " holding diuretics 2/2 ongoing blood losses     COPD (chronic obstructive pulmonary disease)  - appears stable, no exacerbations at present   - continue home meds   - monitor     HLD (hyperlipidemia)  - last lipid panel:   Results for SOCORRO BALLARD (MRN [id number]) as of 8/6/2020 20:41   Ref. Range 2/18/2019 10:20   Cholesterol Latest Ref Range: 120 - 199 mg/dL 136   HDL Latest Ref Range: 40 - 75 mg/dL 43   Hdl/Cholesterol Ratio Latest Ref Range: 20.0 - 50.0 % 31.6   LDL Cholesterol External Latest Ref Range: 63.0 - 159.0 mg/dL 57.4 (L)   Non HDL Chol. (LDL+VLDL) Unknown    Non-HDL Cholesterol Latest Units: mg/dL 93   Total Cholesterol/HDL Ratio Latest Ref Range: 2.0 - 5.0  3.2   Triglycerides Latest Ref Range: 30 - 150 mg/dL 178 (H)     VTE Risk Mitigation (From admission, onward)         Ordered     Reason for No Pharmacological VTE Prophylaxis  Once     Question:  Reasons:  Answer:  Active Bleeding    08/06/20 2055     IP VTE HIGH RISK PATIENT  Once      08/06/20 2055     Place sequential compression device  Until discontinued      08/06/20 2055                   Di Rueda PA-C  Department of Hospital Medicine   [facility]

## 2020-08-07 NOTE — CONSULTS
Gastroenterology Consult    8/7/2020  7:58 AM    Consulting Physician:  Ever Tejeda MD    Primary Care Provider: Sterling Wellington MD    Reason for consultation: hematochezia    HPI:  Caryn Rodriguez is a 70 y.o. female who presented to the ED at [facility] with complaints of acute onset, moderate volume hematochezia exacerbated by oral anticoagulation secondary to iliac stenting 3 weeks ago.  Denies any triggers of the bleeding, states that her last episode was at 1 AM. She also complains of diffuse abdominal pain that has been present since stent was placed and has preceded the hematochezia.  There was concerns re: mesenteric ischemia, she was transferred to [facility] for escalation of care with GI and surgical consultations.  Lactic acid was 4.4.  Blood cultures ordered and she was placed on broad spectrum abx. CT with diverticulosis.  Mesenteric duplex US was limited, but without demonstrable stenosis of celiac SMA.  She has been transfused 2 units of PRBC's.  Hb stable on AM lab draw.    Past Medical History:  Past Medical History:   Diagnosis Date    COPD (chronic obstructive pulmonary disease)     Coronary artery disease     Disorder of kidney and ureter     GERD (gastroesophageal reflux disease)     Hypertension     PONV (postoperative nausea and vomiting)        Allergies:   Review of patient's allergies indicates:   Allergen Reactions    Codeine     Iodine and iodide containing products      Patient broke out in hives after last receiving Iodine    Metolazone      Causes dizziness, palpitations, nausea    Omnipaque [iohexol]      Pt states she had rash after returning home from last  Steroid injection, though no rash noted before discharge from PACU    Penicillins     Potassium     Sulfa (sulfonamide antibiotics)     Sulfone        Current Medications:  Medications Prior to Admission   Medication Sig Dispense Refill Last Dose    albuterol (ACCUNEB) 1.25 mg/3 mL Nebu Inhale 1 ampule  into the lungs. 4 times PRN       albuterol (PROVENTIL/VENTOLIN HFA) 90 mcg/actuation inhaler Inhale 2 puffs into the lungs every 6 (six) hours as needed for Wheezing. Rescue       allopurinol (ZYLOPRIM) 100 MG tablet TAKE 1 TABLET BY MOUTH ONE TIME DAILY 90 tablet 3     alprazolam (XANAX) 0.25 MG tablet Take 0.25 mg by mouth daily as needed for Anxiety.       aspirin 81 MG Chew Take 81 mg by mouth once daily.       bumetanide (BUMEX) 1 MG tablet Take 1 tablet (1 mg total) by mouth 2 (two) times daily. 180 tablet 3     buPROPion (WELLBUTRIN XL) 150 MG TB24 tablet 150 mg once daily.        carvedilol (COREG) 12.5 MG tablet Take 12.5 mg by mouth 2 (two) times daily.       ELIQUIS 5 mg Tab Take 5 mg by mouth 4 (four) times daily. 1 table four times daily for the first 7 days, then 1 twice daily       fluticasone-umeclidin-vilanter (TRELEGY ELLIPTA) 100-62.5-25 mcg DsDv Inhale 1 puff into the lungs once daily.       gabapentin (NEURONTIN) 300 MG capsule Take 300 mg by mouth 2 (two) times daily.       magnesium 250 mg Tab Take 250 mg by mouth once daily.       montelukast (SINGULAIR) 10 mg tablet 10 mg nightly.       omeprazole (PRILOSEC) 40 MG capsule Take 1 capsule (40 mg total) by mouth once daily. 30 capsule 2     spironolactone (ALDACTONE) 25 MG tablet Take 1 tablet (25 mg total) by mouth once daily. 90 tablet 3     traMADoL (ULTRAM) 50 mg tablet Take 50 mg by mouth daily as needed.            Social History:  Social History     Socioeconomic History    Marital status:      Spouse name: Not on file    Number of children: Not on file    Years of education: Not on file    Highest education level: Not on file   Occupational History    Not on file   Social Needs    Financial resource strain: Not on file    Food insecurity     Worry: Not on file     Inability: Not on file    Transportation needs     Medical: Not on file     Non-medical: Not on file   Tobacco Use    Smoking status: Former  Smoker     Types: Cigarettes    Smokeless tobacco: Never Used   Substance and Sexual Activity    Alcohol use: No    Drug use: No    Sexual activity: Not on file   Lifestyle    Physical activity     Days per week: Not on file     Minutes per session: Not on file    Stress: Not on file   Relationships    Social connections     Talks on phone: Not on file     Gets together: Not on file     Attends [employer] service: Not on file     Active member of club or organization: Not on file     Attends meetings of clubs or organizations: Not on file     Relationship status: Not on file   Other Topics Concern    Not on file   Social History Narrative    Not on file       Surgical History:  Past Surgical History:   Procedure Laterality Date    Double Bypass      HYSTERECTOMY      INJECTION OF ANESTHETIC AGENT AROUND MEDIAL BRANCH NERVES INNERVATING LUMBAR FACET JOINT Right 3/13/2020    Procedure: BLOCK, NERVE, FACET JOINT, LUMBAR, MEDIAL BRANCH;  Surgeon: Kam Baker MD;  Location: [facility] OR;  Service: Pain Management;  Laterality: Right;  L2,3,4,5    INSERTION OF IMPLANTABLE LOOP RECORDER N/A 3/11/2019    Procedure: Insertion, Implantable Loop Recorder;  Surgeon: Art Marquez MD;  Location: [facility] CATH LAB;  Service: Cardiology;  Laterality: N/A;    INSERTION OF MULTIFOCAL INTRAOCULAR LENS Right 11/20/2019    Procedure: INSERTION, IOL, MULTIFOCAL;  Surgeon: Larry Leal MD;  Location: [facility] OR;  Service: Ophthalmology;  Laterality: Right;    PHACOEMULSIFICATION OF CATARACT Right 11/20/2019    Procedure: PHACOEMULSIFICATION, CATARACT;  Surgeon: Larry Leal MD;  Location: [facility] OR;  Service: Ophthalmology;  Laterality: Right;    TRANSFORAMINAL EPIDURAL INJECTION OF STEROID Right 1/31/2020    Procedure: INJECTION, STEROID, EPIDURAL, TRANSFORAMINAL APPROACH;  Surgeon: Kam Baker MD;  Location: [facility] OR;  Service: Pain Management;  Laterality: Right;  L4, L5         Family  History:  Family History   Problem Relation Age of Onset    Cancer Mother     Stroke Mother     Stroke Father     Cancer Sister     Heart disease Brother     Dementia Maternal Aunt     Cancer Maternal Uncle     Hypertension Paternal Aunt     Cancer Paternal Uncle     Dementia Maternal Grandmother        Review of systems:     CONSTITUTIONAL: Negative for fever, chills, weakness, weight loss, weight gain.  HEENT: Negative for blurred vision, hearing loss, nasal congestion, dry mouth, sore throat.  CARDIOVASCULAR: Negative for chest pain or palpitations.  RESPIRATORY: Negative for SOB or cough.  GASTROINTESTINAL: See HPI  GENITOURINARY: Negative for dysuria or hematuria.  MUSCULOSKELETAL: Negative for osteoarthritis or muscle pain.  SKIN: Negative for rashes/lesions.  NEUROLOGIC: Negative for headaches, numbness/tingling.  ENDOCRINE: Negative for diabetes or thyroid abnormalities.  HEMATOLOGIC: Negative for anemia or blood dyscrasias.  Aside from above positives, complete 10 point review of systems negative.    Physical Exam:  Vital Signs (Most Recent):  Temp: 98.2 °F (36.8 °C) (08/07/20 0715)  Pulse: 92 (08/07/20 0738)  Resp: 18 (08/07/20 0738)  BP: 118/66 (08/07/20 0738)  SpO2: 98 % (08/07/20 0738) Vital Signs (24h Range):  Temp:  [97 °F (36.1 °C)-98.5 °F (36.9 °C)] 98.2 °F (36.8 °C)  Pulse:  [] 92  Resp:  [12-37] 18  SpO2:  [96 %-100 %] 98 %  BP: ()/(55-98) 118/66       General: Well developed, well nourished, female in no acute distress.    Eyes:  Anicteric sclera, PERRLA  ENT:  Moist mucous membranes, no drainage from ears or nose, hearing grossly intact  Lymph:  No cervical, supraclavicular or axillary lymphadenopathy  Neck:  Supple, no nodes or masses felt, no thyromegaly  Cardiovascular:  Regular rate and rhythm without murmur  Lungs:  Clear to auscultation with normal effort; no wheezes or rales noted  GI:  Soft, diffusely tender with no focal masses, no rebound or guarding, bowel sounds  present.  Musculoskeletal:  5/5 strength bilaterally  Extremities: No clubbing, cyanosis, or edema, 2+ dorsalis pedis bilaterally  Neurologic:  No focal deficits, alert and oriented x 3  Psych:  Appropriate mood and affect  Skin:  No rash, no pallor, no lesions       Labs:  Results for SOCORRO BALLARD (MRN [id number]) as of 8/7/2020 08:03   Ref. Range 8/7/2020 02:34 8/7/2020 06:39   WBC Latest Ref Range: 3.90 - 12.70 K/uL 25.26 (H) 15.83 (H)   RBC Latest Ref Range: 4.00 - 5.40 M/uL 3.76 (L) 3.33 (L)   Hemoglobin Latest Ref Range: 12.0 - 16.0 g/dL 9.8 (L) 8.7 (L)   Hematocrit Latest Ref Range: 37.0 - 48.5 % 31.3 (L) 27.7 (L)   MCV Latest Ref Range: 82 - 98 fL 83 83   MCH Latest Ref Range: 27.0 - 31.0 pg 26.1 (L) 26.1 (L)   MCHC Latest Ref Range: 32.0 - 36.0 g/dL 31.3 (L) 31.4 (L)   RDW Latest Ref Range: 11.5 - 14.5 % 23.4 (H) 23.7 (H)   Platelets Latest Ref Range: 150 - 350 K/uL 304 249     Results for SOCORRO BALLARD (MRN [id number]) as of 8/7/2020 08:03   Ref. Range 8/7/2020 02:34   Protime Latest Ref Range: 9.0 - 12.5 sec 12.5   INR Latest Ref Range: 0.8 - 1.2  1.2   Results for SOCORRO BALLARD (MRN [id number]) as of 8/7/2020 08:03   Ref. Range 8/7/2020 02:34   Sodium Latest Ref Range: 136 - 145 mmol/L 137   Potassium Latest Ref Range: 3.5 - 5.1 mmol/L 4.9   Chloride Latest Ref Range: 95 - 110 mmol/L 108   CO2 Latest Ref Range: 23 - 29 mmol/L 19 (L)   Anion Gap Latest Ref Range: 8 - 16 mmol/L 10   BUN, Bld Latest Ref Range: 8 - 23 mg/dL 39 (H)   Creatinine Latest Ref Range: 0.5 - 1.4 mg/dL 1.8 (H)   eGFR if non African American Latest Ref Range: >60 mL/min/1.73 m^2 28 (A)   eGFR if African American Latest Ref Range: >60 mL/min/1.73 m^2 32 (A)   Glucose Latest Ref Range: 70 - 110 mg/dL 144 (H)   Calcium Latest Ref Range: 8.7 - 10.5 mg/dL 8.2 (L)   Alkaline Phosphatase Latest Ref Range: 55 - 135 U/L 69   PROTEIN TOTAL Latest Ref Range: 6.0 - 8.4 g/dL 5.3 (L)   Albumin Latest Ref Range: 3.5 - 5.2 g/dL 2.8 (L)    BILIRUBIN TOTAL Latest Ref Range: 0.1 - 1.0 mg/dL 0.7   AST Latest Ref Range: 10 - 40 U/L 18   ALT Latest Ref Range: 10 - 44 U/L 14     Imaging and Other Studies:  Personally reviewed    US MESENTERIC ISCHEMIA STUDY (XPD)   Impression:     The examination is limited as discussed above however there is no sonographic evidence for hemodynamically significant stenosis of the celiac artery or superior mesenteric artery based upon peak systolic velocity criteria.  CT ABDOMEN PELVIS WITHOUT CONTRAST   Impression:     Diverticulosis coli.     Cholelithiasis      Assessment:  Patient is a 69 yo on oral anticoagulation secondary to recent iliac stents who was transferred for [facility] for new onset hematochezia.  She has received 2 units of PRBCs, Hb stable this AM.  CT with diverticulosis.  U/S Duplex with no stenosis of SMA/Celiac.    Plan:  1.  NPO except ice chips  2.  Serial Hb/Hct, transfuse per primary team  3.  Continue holding oral anticoagulation  4.  Agree with surgery consult, but doubt mesenteric ischemia as no colitis on CT.    5.  If patient re-bleeds, would recommend angiography with IR consultation for intervention.    Will follow      Ever Tejeda

## 2020-08-07 NOTE — ASSESSMENT & PLAN NOTE
- recently diagnosed mesenteric ischemia   - s/p iliac vein stenting 3 weeks ago  - originally on Xarelto, now on Eliquis , holding due to bleeding  - repeat lactic acid improved  -continue ivf, abx till cx results  - patient had leukocytosis on admit, also h/o steroid injection and medrol dose pack last week

## 2020-08-07 NOTE — ASSESSMENT & PLAN NOTE
- BP reported low at end of ED stay, and en route, normotensive now   - hold home meds: carvedilol 12.5 mg BID   - monitor

## 2020-08-07 NOTE — ASSESSMENT & PLAN NOTE
- Ms. Caryn Rodriguez presented with BRBPR   - H&H have been stable  - s/p 1 unit PRBCs  - GI bleed pathways initiated   - CT in ED showed diverticulosis, no diverticulitis   - GI consulted   - montior & transfuse PRN  - protonix ordered   - given concern for mesenteric ischemia to following were ordered:   LDH, amylase, d-dimer, US mesentery, Surgery Consult    - patient started on broad spectrum antibiotics

## 2020-08-07 NOTE — CONSULTS
"Pulmonary / Critical Care Medicine  Consult Note      Reason for Consult: gi bleed, copd      History of Present Illness:     Ms. Caryn Rodriguez is a 70 y.o. female, with PMH of CAD s/p CABG 2005, iliac vein stenting 3 weeks ago (7/17)on Eliquis (pt reports was due to GLADIS, NOT abdominal issues??), severe COPD (FEV1 0.58, 28%), HTN, GERD, who presented to [facility] on 8/6/20 after with BRBPR.    Patient states she had one episode of BRBPR on Wednesday. Then on Thursday, had several bloody bowel movements which she reports "filled the toilet bowl." States having diffuse, crampy abdominal pain associated with bowel movements, she is unclear which started first. States she started feeling very dizzy, lightheaded, and weak which is why she presented to the hospital.   Associated with nausea. Denies hematemesis, fevers, or chills. Denies NSAID use. Recently started on eliquis for bilateral iliac vein stents being placed at OSH 7/17 for which she states was placed due to GLADIS. States has never had abominal pain/issues before. Prior to these episodes, has never had blood in stool. Reports never had colonoscopy. Reports had duodenal ulcer > 40 years ago for which she takes a PPI and has no symptoms.  This morning, states she has not had a bloody BM since ~ 1am.     ROS: Comprehensive review of systems obtained and is negative except as noted in HPI.    PMHx:   Past Medical History:   Diagnosis Date    COPD (chronic obstructive pulmonary disease)     Coronary artery disease     Disorder of kidney and ureter     GERD (gastroesophageal reflux disease)     Hypertension     PONV (postoperative nausea and vomiting)        Home Meds:   Current Facility-Administered Medications on File Prior to Encounter   Medication Dose Route Frequency Provider Last Rate Last Dose    0.9%  NaCl infusion   Intravenous Continuous Kam Baker MD   Stopped at 03/13/20 0840    [COMPLETED] albuterol-ipratropium 2.5 mg-0.5 mg/3 mL " nebulizer solution 3 mL  3 mL Nebulization ED 1 Time Fernanda Tucker MD   3 mL at 08/06/20 1713    [COMPLETED] ondansetron injection 4 mg  4 mg Intravenous ED 1 Time Regan Amado MD   4 mg at 08/06/20 1941    [COMPLETED] sodium chloride 0.9% bolus 1,000 mL  1,000 mL Intravenous ED 1 Time Regan Amado MD   1,000 mL at 08/06/20 1826    [COMPLETED] sodium chloride 0.9% bolus 500 mL  500 mL Intravenous ED 1 Time Fernanda Tucker MD   Stopped at 08/06/20 1814    sodium chloride 0.9% flush 10 mL  10 mL Intravenous PRN Kam Baker MD        [DISCONTINUED] 0.9%  NaCl infusion (for blood administration)   Intravenous Q24H PRN Fernanda Tucker MD        [DISCONTINUED] 0.9%  NaCl infusion (for blood administration)   Intravenous Q24H PRN Regan Amado MD        [DISCONTINUED] 0.9%  NaCl infusion (for blood administration)   Intravenous Q24H PRN Regan Amado MD        [COMPLETED] sodium chloride 0.9% bolus 1,000 mL  1,000 mL Intravenous ED 1 Time Regan Amado MD   1,000 mL at 08/06/20 1930     Current Outpatient Medications on File Prior to Encounter   Medication Sig Dispense Refill    albuterol (ACCUNEB) 1.25 mg/3 mL Nebu Inhale 1 ampule into the lungs. 4 times PRN      albuterol (PROVENTIL/VENTOLIN HFA) 90 mcg/actuation inhaler Inhale 2 puffs into the lungs every 6 (six) hours as needed for Wheezing. Rescue      allopurinol (ZYLOPRIM) 100 MG tablet TAKE 1 TABLET BY MOUTH ONE TIME DAILY 90 tablet 3    alprazolam (XANAX) 0.25 MG tablet Take 0.25 mg by mouth daily as needed for Anxiety.      aspirin 81 MG Chew Take 81 mg by mouth once daily.      bumetanide (BUMEX) 1 MG tablet Take 1 tablet (1 mg total) by mouth 2 (two) times daily. 180 tablet 3    buPROPion (WELLBUTRIN XL) 150 MG TB24 tablet 150 mg once daily.       carvedilol (COREG) 12.5 MG tablet Take 12.5 mg by mouth 2 (two) times daily.      ELIQUIS 5 mg Tab Take 5 mg by mouth 4 (four) times daily. 1 table  four times daily for the first 7 days, then 1 twice daily      fluticasone-umeclidin-vilanter (TRELEGY ELLIPTA) 100-62.5-25 mcg DsDv Inhale 1 puff into the lungs once daily.      gabapentin (NEURONTIN) 300 MG capsule Take 300 mg by mouth 2 (two) times daily.      magnesium 250 mg Tab Take 250 mg by mouth once daily.      montelukast (SINGULAIR) 10 mg tablet 10 mg nightly.      omeprazole (PRILOSEC) 40 MG capsule Take 1 capsule (40 mg total) by mouth once daily. 30 capsule 2    spironolactone (ALDACTONE) 25 MG tablet Take 1 tablet (25 mg total) by mouth once daily. 90 tablet 3    traMADoL (ULTRAM) 50 mg tablet Take 50 mg by mouth daily as needed.         PSHx:   Past Surgical History:   Procedure Laterality Date    Double Bypass      HYSTERECTOMY      INJECTION OF ANESTHETIC AGENT AROUND MEDIAL BRANCH NERVES INNERVATING LUMBAR FACET JOINT Right 3/13/2020    Procedure: BLOCK, NERVE, FACET JOINT, LUMBAR, MEDIAL BRANCH;  Surgeon: Kam Baker MD;  Location: [facility] OR;  Service: Pain Management;  Laterality: Right;  L2,3,4,5    INSERTION OF IMPLANTABLE LOOP RECORDER N/A 3/11/2019    Procedure: Insertion, Implantable Loop Recorder;  Surgeon: Art Marquez MD;  Location: [facility] CATH LAB;  Service: Cardiology;  Laterality: N/A;    INSERTION OF MULTIFOCAL INTRAOCULAR LENS Right 11/20/2019    Procedure: INSERTION, IOL, MULTIFOCAL;  Surgeon: Larry Leal MD;  Location: [facility] OR;  Service: Ophthalmology;  Laterality: Right;    PHACOEMULSIFICATION OF CATARACT Right 11/20/2019    Procedure: PHACOEMULSIFICATION, CATARACT;  Surgeon: Larry Leal MD;  Location: [facility] OR;  Service: Ophthalmology;  Laterality: Right;    TRANSFORAMINAL EPIDURAL INJECTION OF STEROID Right 1/31/2020    Procedure: INJECTION, STEROID, EPIDURAL, TRANSFORAMINAL APPROACH;  Surgeon: Kam Baker MD;  Location: [facility] OR;  Service: Pain Management;  Laterality: Right;  L4, L5       Allergies:   Review of patient's  allergies indicates:   Allergen Reactions    Codeine     Iodine and iodide containing products      Patient broke out in hives after last receiving Iodine    Metolazone      Causes dizziness, palpitations, nausea    Omnipaque [iohexol]      Pt states she had rash after returning home from last  Steroid injection, though no rash noted before discharge from PACU    Penicillins     Potassium     Sulfa (sulfonamide antibiotics)     Sulfone          SHX:   - Tobacco:  reports that she has quit smoking. Her smoking use included cigarettes. She has never used smokeless tobacco.  - EtOH:  reports no history of alcohol use.  - Illicit:   Social History     Substance and Sexual Activity   Drug Use No     - Occupation: Data Unavailable    FHx:   family history includes Cancer in her maternal uncle, mother, paternal uncle, and sister; Dementia in her maternal aunt and maternal grandmother; Heart disease in her brother; Hypertension in her paternal aunt; Stroke in her father and mother.    Current Meds:   Scheduled:    ciprofloxacin  200 mg Intravenous Q12H    metronidazole  500 mg Intravenous Q8H    montelukast  10 mg Oral Nightly    pantoprazole  40 mg Intravenous BID       Continuous Infusions:    sodium chloride 0.9% 75 mL/hr at 08/06/20 2303       PRN:   sodium chloride, albuterol-ipratropium, Pharmacy to dose Vancomycin consult **AND** vancomycin - pharmacy to dose    VITAL SIGNS:   Temp:  [97 °F (36.1 °C)-98.5 °F (36.9 °C)]   Pulse:  []   Resp:  [12-37]   BP: ()/(55-98)   SpO2:  [96 %-100 %]        PHYSICAL EXAM  Gen: well developed, well nourished, no distress   HEENT: lips, mucosa, and tongue normal; teeth and gums normal  conjunctivae/corneas clear. PERRL.   CVS: tachycardic   Chest: normal respiratory effort, normal percussion bilaterally and diminished breath sounds bilaterally   Abdomen: soft, tenderness to deep palpation noted in all quadrants, most pronounced in RUQ/RLQ, bowel sounds  present, not peritonitic   Ext: warm, well perfused and no cyanosis or edema, or clubbing   Skin: no petechiae, no jaundice   Neuro: oriented, normal, normal mood, grossly non-focal            LABS:     Recent Labs   Lab 08/06/20  1449  08/07/20  0234 08/07/20  0639   WBC 11.63   < > 25.26*  25.26* 15.83*   RBC 3.32*   < > 3.76*  3.76* 3.33*   HGB 9.9*   < > 9.8*  9.8* 8.7*   HCT 31.7*   < > 31.3*  31.3* 27.7*   *   < > 304  304 249   MCV 96   < > 83  83 83   MCH 29.8   < > 26.1*  26.1* 26.1*   MCHC 31.2*   < > 31.3*  31.3* 31.4*     --  137  --    K 4.3  --  4.9  --      --  108  --    CO2 23  --  19*  --    BUN 38*  --  39*  --    CREATININE 1.57*  --  1.8*  --    ALT 16  --  14  --    AST 21  --  18  --    ALKPHOS 90  --  69  --    BILITOT 0.4  --  0.7  --    PROT 6.9  --  5.3*  --    ALBUMIN 4.1  --  2.8*  --    INR 3.4*  --  1.2  --    APTT 40.9*  --   --   --     < > = values in this interval not displayed.           CXR:    X-ray Chest Ap Portable    Result Date: 8/6/2020  No acute abnormality. Electronically signed by: Kam Paredes Date:    08/06/2020 Time:    21:35       ASSESSMENT/PLAN:       CNS:   No issues    Cardio:   #CAD with h/o CABG 2005  #HTN  #HFpEF  #PAD  - had recent iliac vein stents on 7/17, due to venous insufficiency? Patient states was not due to abdominal issues  - need to hold AC now however concern with holding AC in setting recent stent placement, continue to monitor  - hold antihypertensives at this time due to GI bleed  - caution IVF/volume with h/o HFpEF    Resp:   #Severe COPD:   - PFTs 2019 FEV1 0.58/28%; severe obstruction, at home on trelegy  - no signs exacerbation at this time  - continue home inhaler or hospital equivalent (LAMA/LABA/ICS)   - nebs prn SOB/wheezing    GI:   #Acute GI bleed:   - does have h.o duodenal ulcer ~ 40 years ago however reports sx under control with home PPI  - denies NSAID use  - likely lower with hematochezia  - never  had colonoscopy  - acute drop Hgb 9.9 down from 13 a few months ago  - INR was 3.4 on admission, received kcentra with imrpvoement INR 1.3  - received 1 unit PRBC   - last bloody BM was ~ 1 am  - 2 large bore IV in place  - transfuse for Hgb > 7  - continue to trend CBC q6h , INR daily  - GI following  - if rebleeds, CTA     Renal:   #ANDRAE on CKD:   - baseline cr ~ 1.5, today 1.8  - likely due to ATN/hypovolumia  - on continuous fluids  - avoid nephrotoxics, strict I.Os    ID:   #Leukocytosis:   - concerning for infection, CT without obvious source, no respiratory sx  - continue cipro/flagyl for abdominal coverage    PPx:   - SCDS, hold chemical ppx due to acute bleed        Thank you for this consult, will continue to follow    Kandi Lynch MD  Pulmonary / Critical Care Fellow  Pager: [phone]  08/07/2020

## 2020-08-07 NOTE — ASSESSMENT & PLAN NOTE
- recently diagnosed mesenteric ischemia   - s/p iliac vein stenting 3 weeks ago  - originally on Xarelto, now on Eliquis   - repeat lactic acid upon arrival  - continue to hydrate

## 2020-08-07 NOTE — PLAN OF CARE
Initial Discharge Planning Assessment:  Patient admitted on 8/6/2020    Chart reviewed, Care plan discussed with treatment team,  attending Dr Malone    PCP updated in Epic: Dr. Wellington  Pharmacy, updated in [facility]: [facility] in [place]    DME at home: RW, shower chair    Current dispo: home with family  Transportation: spouse to drive home    Power of  or Living Will: none    Case management  to follow.  No anticipated DC needs from CM perspective.       08/07/20 1448   Discharge Assessment   Assessment Type Discharge Planning Assessment   Confirmed/corrected address and phone number on facesheet? Yes   Assessment information obtained from? Patient   Communicated expected length of stay with patient/caregiver yes   Prior to hospitilization cognitive status: Alert/Oriented   Prior to hospitalization functional status: Independent   Current cognitive status: Alert/Oriented   Current Functional Status: Independent   Lives With spouse   Able to Return to Prior Arrangements yes   Is patient able to care for self after discharge? Yes   Who are your caregiver(s) and their phone number(s)? Spouse: Clarke Rodriguez [phone]   Patient's perception of discharge disposition home or selfcare   Readmission Within the Last 30 Days no previous admission in last 30 days   Patient currently being followed by outpatient case management? No   Patient currently receives any other outside agency services? No   Equipment Currently Used at Home walker, rolling;shower chair   Do you have any problems affording any of your prescribed medications? No   Is the patient taking medications as prescribed? yes   Does the patient have transportation home? Yes   Transportation Anticipated family or friend will provide   Does the patient receive services at the [facility]? No   Discharge Plan A Home with family   Discharge Plan B Home   DME Needed Upon Discharge  none   Patient/Family in Agreement with Plan yes

## 2020-08-07 NOTE — HPI
Ms. Caryn Rodriguez is a 70 y.o. female, with PMH of CAD, Mesenteric ischemia (s/p iliac vein stenting 3 weeks ago, on Eliquis), COPD, HTN, GERD, who presented to [facility] Ed on 8/6/20 after a bloody BM that morning. She described the BM as bright red blood in diarrhea, which occurred multiple times. This is associated with diffuse abdominal pain. She states that she first started with abdominal pain when she started taking Eliquis 3 weeks ago. It has been associated with nausea and diarrhea. She states she had an angiogram on 7/17/20 and had stents placed. Initially she continued on the Eliquis, but the abdominal pain has become to much to take it, and she was changed to Xarelto two days aog. Her last dose of Eliquis was 8/5/20 when she started taking Xarelto, she has taken two doses of Xarelto since beginning this medication. Initial HGB in ED was 9.9, and upon repeat was 9.7. After another bloody BM in the ED, PRBC transfusion was initiated. Lactic Acid francisca from 2.6 to 4.4 while in the ED. Blood cultures pending, CXR and UA ordered upon admission. She was admitted to inpatient status to the ICU.

## 2020-08-07 NOTE — ASSESSMENT & PLAN NOTE
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 Destinee Noyola   - renal following  Monitor labs  - continue ivf  "

## 2020-08-07 NOTE — ASSESSMENT & PLAN NOTE
- originally on Xarelto, now on Eliquis  - INR elevated at 3.4   - hold anticoagulation give GI bleeding   - inaccurate result with being on anticoagulation  - was given k centra with bleeding

## 2020-08-07 NOTE — ASSESSMENT & PLAN NOTE
- Ms. Caryn Rodriguez presented with BRBPR   - H&H have been stable  - s/p 2  unit PRBCs  - GI bleed pathways initiated   - CT in ED showed diverticulosis, no diverticulitis   - GI consulted   - montior & transfuse PRN  - protonix ordered   - likely diverticular bleed  - cannot do cta due to esther and only single kidney  - concern for mesenteric ischemia  Though us did not show significant stenosis  - patient started on broad spectrum antibiotics   - sx consulted

## 2020-08-07 NOTE — SUBJECTIVE & OBJECTIVE
Interval History: had 2 bloody bm today, initially bloody bm stopped after midnight, c/o abdominal pain , back pain, pain in legs better than before her procedure.    Review of Systems   Constitutional: Negative for chills and fever.   HENT: Negative for sinus pain and trouble swallowing.    Respiratory: Negative for cough and shortness of breath.    Cardiovascular: Negative for chest pain and leg swelling.   Gastrointestinal: Positive for abdominal pain, blood in stool, diarrhea and nausea. Negative for vomiting.   Genitourinary: Negative for dysuria and hematuria.   Musculoskeletal: Positive for arthralgias and back pain.   Skin: Negative for rash.   Neurological: Negative for headaches.   Psychiatric/Behavioral: Negative for confusion.     Objective:     Vital Signs (Most Recent):  Temp: 99 °F (37.2 °C) (08/07/20 1130)  Pulse: 92 (08/07/20 1503)  Resp: 18 (08/07/20 1503)  BP: (!) 122/56 (08/07/20 1503)  SpO2: 96 % (08/07/20 1503) Vital Signs (24h Range):  Temp:  [97 °F (36.1 °C)-99 °F (37.2 °C)] 99 °F (37.2 °C)  Pulse:  [] 92  Resp:  [12-37] 18  SpO2:  [96 %-100 %] 96 %  BP: ()/(55-98) 122/56     Weight: 82.4 kg (181 lb 10.5 oz)  Body mass index is 32.18 kg/m².    Intake/Output Summary (Last 24 hours) at 8/7/2020 1536  Last data filed at 8/7/2020 1204  Gross per 24 hour   Intake 1600 ml   Output 535 ml   Net 1065 ml      Physical Exam  Vitals signs reviewed.   Constitutional:       General: She is not in acute distress.     Appearance: She is obese.   HENT:      Head: Normocephalic and atraumatic.   Eyes:      Extraocular Movements: Extraocular movements intact.      Pupils: Pupils are equal, round, and reactive to light.   Neck:      Musculoskeletal: Normal range of motion and neck supple.   Cardiovascular:      Rate and Rhythm: Normal rate and regular rhythm.   Pulmonary:      Effort: Pulmonary effort is normal. No respiratory distress.      Breath sounds: Normal breath sounds.   Abdominal:       General: Bowel sounds are normal. There is no distension.      Palpations: Abdomen is soft.      Tenderness: There is abdominal tenderness (diffuse overall tenderness).   Musculoskeletal: Normal range of motion.         General: No swelling.   Skin:     General: Skin is warm.   Neurological:      General: No focal deficit present.      Mental Status: She is alert and oriented to person, place, and time.      Cranial Nerves: No cranial nerve deficit.   Psychiatric:         Behavior: Behavior normal.         Significant Labs:   BMP:   Recent Labs   Lab 08/07/20  0234   *      K 4.9      CO2 19*   BUN 39*   CREATININE 1.8*   CALCIUM 8.2*     CBC:   Recent Labs   Lab 08/07/20  0639 08/07/20  1008 08/07/20  1406   WBC 15.83* 14.79* 12.89*   HGB 8.7* 8.7* 8.1*   HCT 27.7* 27.2* 25.6*    258 233       Significant Imaging: I have reviewed all pertinent imaging results/findings within the past 24 hours.

## 2020-08-07 NOTE — ASSESSMENT & PLAN NOTE
- last EFCHO 8/31/17:  CONCLUSIONS     1 - Normal left ventricular systolic function (EF 55-60%).     2 - Impaired LV relaxation, normal LAP (grade 1 diastolic dysfunction).     3 - Normal right ventricular systolic function .     4 - Mild mitral regurgitation.     5 - Mild tricuspid regurgitation.   - monitor I&O, daily weights   - holding diuretics 2/2 ongoing blood losses

## 2020-08-07 NOTE — NURSING
Latest hgb/hct reported to  (Critical Care). No interventions at this time. Syed states if patient has another bloody BM to inform critical care team for further intervention.

## 2020-08-07 NOTE — PROGRESS NOTES
[facility] Hospital Medicine  Progress Note    Patient Name: Caryn Rodriguez  MRN: [id number]  Patient Class: IP- Inpatient   Admission Date: 8/6/2020  Length of Stay: 1 days  Attending Physician: Ann Malone MD  Primary Care Provider: Sterling Wellington MD        Subjective:     Principal Problem:Acute GI bleeding        HPI:  Ms. Caryn Rodriguez is a 70 y.o. female, with PMH of CAD, Mesenteric ischemia (s/p iliac vein stenting 3 weeks ago, on Eliquis), COPD, HTN, GERD, who presented to [facility] Ed on 8/6/20 after a bloody BM that morning. She described the BM as bright red blood in diarrhea, which occurred multiple times. This is associated with diffuse abdominal pain. She states that she first started with abdominal pain when she started taking Eliquis 3 weeks ago. It has been associated with nausea and diarrhea. She states she had an angiogram on 7/17/20 and had stents placed. Initially she continued on the Eliquis, but the abdominal pain has become to much to take it, and she was changed to Xarelto two days aog. Her last dose of Eliquis was 8/5/20 when she started taking Xarelto, she has taken two doses of Xarelto since beginning this medication. Initial HGB in ED was 9.9, and upon repeat was 9.7. After another bloody BM in the ED, PRBC transfusion was initiated. Lactic Acid francisca from 2.6 to 4.4 while in the ED. Blood cultures pending, CXR and UA ordered upon admission. She was admitted to inpatient status to the ICU.     Overview/Hospital Course:  Patient was started on broad spectrum abx with cipro, flagyl, vanc, aztreonam.Aztreonam was dcd.Repeat lactic acid better.Wbc improving.    Interval History: had 2 bloody bm today, initially bloody bm stopped after midnight, c/o abdominal pain , back pain, pain in legs better than before her procedure.    Review of Systems   Constitutional: Negative for chills and fever.   HENT: Negative for sinus pain and trouble swallowing.    Respiratory:  Negative for cough and shortness of breath.    Cardiovascular: Negative for chest pain and leg swelling.   Gastrointestinal: Positive for abdominal pain, blood in stool, diarrhea and nausea. Negative for vomiting.   Genitourinary: Negative for dysuria and hematuria.   Musculoskeletal: Positive for arthralgias and back pain.   Skin: Negative for rash.   Neurological: Negative for headaches.   Psychiatric/Behavioral: Negative for confusion.     Objective:     Vital Signs (Most Recent):  Temp: 99 °F (37.2 °C) (08/07/20 1130)  Pulse: 92 (08/07/20 1503)  Resp: 18 (08/07/20 1503)  BP: (!) 122/56 (08/07/20 1503)  SpO2: 96 % (08/07/20 1503) Vital Signs (24h Range):  Temp:  [97 °F (36.1 °C)-99 °F (37.2 °C)] 99 °F (37.2 °C)  Pulse:  [] 92  Resp:  [12-37] 18  SpO2:  [96 %-100 %] 96 %  BP: ()/(55-98) 122/56     Weight: 82.4 kg (181 lb 10.5 oz)  Body mass index is 32.18 kg/m².    Intake/Output Summary (Last 24 hours) at 8/7/2020 1536  Last data filed at 8/7/2020 1204  Gross per 24 hour   Intake 1600 ml   Output 535 ml   Net 1065 ml      Physical Exam  Vitals signs reviewed.   Constitutional:       General: She is not in acute distress.     Appearance: She is obese.   HENT:      Head: Normocephalic and atraumatic.   Eyes:      Extraocular Movements: Extraocular movements intact.      Pupils: Pupils are equal, round, and reactive to light.   Neck:      Musculoskeletal: Normal range of motion and neck supple.   Cardiovascular:      Rate and Rhythm: Normal rate and regular rhythm.   Pulmonary:      Effort: Pulmonary effort is normal. No respiratory distress.      Breath sounds: Normal breath sounds.   Abdominal:      General: Bowel sounds are normal. There is no distension.      Palpations: Abdomen is soft.      Tenderness: There is abdominal tenderness (diffuse overall tenderness).   Musculoskeletal: Normal range of motion.         General: No swelling.   Skin:     General: Skin is warm.   Neurological:      General: No  "focal deficit present.      Mental Status: She is alert and oriented to person, place, and time.      Cranial Nerves: No cranial nerve deficit.   Psychiatric:         Behavior: Behavior normal.         Significant Labs:   BMP:   Recent Labs   Lab 08/07/20  0234   *      K 4.9      CO2 19*   BUN 39*   CREATININE 1.8*   CALCIUM 8.2*     CBC:   Recent Labs   Lab 08/07/20  0639 08/07/20  1008 08/07/20  1406   WBC 15.83* 14.79* 12.89*   HGB 8.7* 8.7* 8.1*   HCT 27.7* 27.2* 25.6*    258 233       Significant Imaging: I have reviewed all pertinent imaging results/findings within the past 24 hours.      Assessment/Plan:      * Acute GI bleeding  - Ms. Caryn Rodriguez presented with BRBPR   - H&H have been stable  - s/p 2  unit PRBCs  - GI bleed pathways initiated   - CT in ED showed diverticulosis, no diverticulitis   - GI consulted   - montior & transfuse PRN  - protonix ordered   - likely diverticular bleed  - cannot do cta due to esther and only single kidney  - concern for mesenteric ischemia  Though us did not show significant stenosis  - patient started on broad spectrum antibiotics   - sx consulted      Abdominal pain  ? Chronic  Patient had it before procedure and does not feel like improved much  - ct without contrast unremarkable      Acute blood loss anemia  S/p 2 units blood  Monitor h/h      Acute renal failure superimposed on stage 3 chronic kidney disease  - suspect 2/2 volume losses   - avoid nephrotoxins, renally dose meds   - patient states "I only have on functioning kidney, the other is deformed"   - follows w/ Dr. Destinee Noyola   - renal following  Monitor labs  - continue ivf    Supratherapeutic INR  - originally on Xarelto, now on Eliquis  - INR elevated at 3.4   - hold anticoagulation give GI bleeding   - inaccurate result with being on anticoagulation  - was given k centra with bleeding    Lactic acidosis  - recently diagnosed mesenteric ischemia   - s/p iliac vein stenting " 3 weeks ago  - originally on Xarelto, now on Eliquis , holding due to bleeding  - repeat lactic acid improved  -continue ivf, abx till cx results  - patient had leukocytosis on admit, also h/o steroid injection and medrol dose pack last week    Chronic diastolic heart failure  - last EFCHO 8/31/17:  CONCLUSIONS     1 - Normal left ventricular systolic function (EF 55-60%).     2 - Impaired LV relaxation, normal LAP (grade 1 diastolic dysfunction).     3 - Normal right ventricular systolic function .     4 - Mild mitral regurgitation.     5 - Mild tricuspid regurgitation.   - monitor I&O, daily weights   - holding diuretics 2/2 ongoing blood losses     Abnormal urinalysis  - doubt uti  - Patient denies any symptoms  - monitor        Carotid artery disease  - holding ASA 2/2 gi bleeding       HLD (hyperlipidemia)  - last lipid panel:   Results for SOCORRO BALLARD (MRN [id number]) as of 8/6/2020 20:41   Ref. Range 2/18/2019 10:20   Cholesterol Latest Ref Range: 120 - 199 mg/dL 136   HDL Latest Ref Range: 40 - 75 mg/dL 43   Hdl/Cholesterol Ratio Latest Ref Range: 20.0 - 50.0 % 31.6   LDL Cholesterol External Latest Ref Range: 63.0 - 159.0 mg/dL 57.4 (L)   Non HDL Chol. (LDL+VLDL) Unknown    Non-HDL Cholesterol Latest Units: mg/dL 93   Total Cholesterol/HDL Ratio Latest Ref Range: 2.0 - 5.0  3.2   Triglycerides Latest Ref Range: 30 - 150 mg/dL 178 (H)      do not see statin on home list    COPD (chronic obstructive pulmonary disease)  - appears stable, no exacerbations at present   - continue home meds , on ellipta  At home, continue  Breo, duonebs  - monitor     Essential hypertension  - BP reported low at end of ED stay, and en route, normotensive now   - hold home meds: carvedilol 12.5 mg BID   - monitor         VTE Risk Mitigation (From admission, onward)         Ordered     Reason for No Pharmacological VTE Prophylaxis  Once     Question:  Reasons:  Answer:  Active Bleeding    08/06/20 2055     IP VTE HIGH RISK  PATIENT  Once      08/06/20 2055     Place sequential compression device  Until discontinued      08/06/20 2055                      Ann Malone MD  Department of Hospital Medicine   [facility]

## 2020-08-07 NOTE — ASSESSMENT & PLAN NOTE
"- suspect 2/2 volume losses   - urine studies ordered   - avoid nephrotoxins, renally dose meds   - patient states "I only have on functioning kidney, the other is deformed"   - follows w/ Dr. Destinee Noyola   "

## 2020-08-07 NOTE — ASSESSMENT & PLAN NOTE
- appears stable, no exacerbations at present   - continue home meds , on ellipta  At home, continue  Breo, duonebs  - monitor

## 2020-08-07 NOTE — NURSING
Pt had a total of 4 moderate bright red bowel movement throughout the shift. Transfused a total of 2 unit of blood. aao x4. Antibiotics started. Benadryl given for itching. Still complains of abd pain but states she feel much better. Will continue to monitor.

## 2020-08-07 NOTE — NURSING
"Pt aaox4, gcs 15, in NAD. Still c/o mild abd pain but tolerable. Pt had 3 bloody stools during shift with approx 175ml of blood loss. Pt maintained MAP of over 75 during shift and denied increased weakness/fatigue. Pt states "when I move around that's when man abdomen and back begins to hurt and then I have to make stool". Endy RODNEY has been notified of pt passing bloody stools. Q6H hgb/hct labs are currently in process at the moment. Pt able to tolerate clear liquid diet with no issue. For further charting refer to flowsheet/MAR.    "

## 2020-08-07 NOTE — ASSESSMENT & PLAN NOTE
- last lipid panel:   Results for SOCORRO BALLARD (MRN [id number]) as of 8/6/2020 20:41   Ref. Range 2/18/2019 10:20   Cholesterol Latest Ref Range: 120 - 199 mg/dL 136   HDL Latest Ref Range: 40 - 75 mg/dL 43   Hdl/Cholesterol Ratio Latest Ref Range: 20.0 - 50.0 % 31.6   LDL Cholesterol External Latest Ref Range: 63.0 - 159.0 mg/dL 57.4 (L)   Non HDL Chol. (LDL+VLDL) Unknown    Non-HDL Cholesterol Latest Units: mg/dL 93   Total Cholesterol/HDL Ratio Latest Ref Range: 2.0 - 5.0  3.2   Triglycerides Latest Ref Range: 30 - 150 mg/dL 178 (H)

## 2020-08-07 NOTE — NURSING
@ 11:47 8/7/2020 pt had approx 100 ml liquid bright red blood noted to stool with 3-4 blood clots. MD Endy informed.

## 2020-08-08 LAB
ALBUMIN SERPL BCP-MCNC: 2.5 G/DL (ref 3.5–5.2)
ALP SERPL-CCNC: 54 U/L (ref 55–135)
ALT SERPL W/O P-5'-P-CCNC: 11 U/L (ref 10–44)
ANION GAP SERPL CALC-SCNC: 8 MMOL/L (ref 8–16)
AST SERPL-CCNC: 14 U/L (ref 10–40)
BASOPHILS # BLD AUTO: 0.03 K/UL (ref 0–0.2)
BASOPHILS # BLD AUTO: 0.04 K/UL (ref 0–0.2)
BASOPHILS NFR BLD: 0.3 % (ref 0–1.9)
BASOPHILS NFR BLD: 0.4 % (ref 0–1.9)
BILIRUB SERPL-MCNC: 0.4 MG/DL (ref 0.1–1)
BLD PROD TYP BPU: NORMAL
BLOOD UNIT EXPIRATION DATE: NORMAL
BLOOD UNIT TYPE CODE: 5100
BLOOD UNIT TYPE: NORMAL
BUN SERPL-MCNC: 20 MG/DL (ref 8–23)
CALCIUM SERPL-MCNC: 7.8 MG/DL (ref 8.7–10.5)
CHLORIDE SERPL-SCNC: 114 MMOL/L (ref 95–110)
CO2 SERPL-SCNC: 18 MMOL/L (ref 23–29)
CODING SYSTEM: NORMAL
CREAT SERPL-MCNC: 1.2 MG/DL (ref 0.5–1.4)
DIFFERENTIAL METHOD: ABNORMAL
DIFFERENTIAL METHOD: ABNORMAL
DISPENSE STATUS: NORMAL
EOSINOPHIL # BLD AUTO: 0.1 K/UL (ref 0–0.5)
EOSINOPHIL # BLD AUTO: 0.1 K/UL (ref 0–0.5)
EOSINOPHIL NFR BLD: 1 % (ref 0–8)
EOSINOPHIL NFR BLD: 1.1 % (ref 0–8)
ERYTHROCYTE [DISTWIDTH] IN BLOOD BY AUTOMATED COUNT: 20.9 % (ref 11.5–14.5)
ERYTHROCYTE [DISTWIDTH] IN BLOOD BY AUTOMATED COUNT: 23.9 % (ref 11.5–14.5)
EST. GFR  (AFRICAN AMERICAN): 53 ML/MIN/1.73 M^2
EST. GFR  (NON AFRICAN AMERICAN): 46 ML/MIN/1.73 M^2
GLUCOSE SERPL-MCNC: 91 MG/DL (ref 70–110)
HCT VFR BLD AUTO: 21.5 % (ref 37–48.5)
HCT VFR BLD AUTO: 21.8 % (ref 37–48.5)
HCT VFR BLD AUTO: 26.2 % (ref 37–48.5)
HCT VFR BLD AUTO: 27 % (ref 37–48.5)
HCT VFR BLD AUTO: 28 % (ref 37–48.5)
HGB BLD-MCNC: 6.7 G/DL (ref 12–16)
HGB BLD-MCNC: 6.8 G/DL (ref 12–16)
HGB BLD-MCNC: 8.2 G/DL (ref 12–16)
HGB BLD-MCNC: 8.6 G/DL (ref 12–16)
HGB BLD-MCNC: 8.9 G/DL (ref 12–16)
IMM GRANULOCYTES # BLD AUTO: 0.05 K/UL (ref 0–0.04)
IMM GRANULOCYTES # BLD AUTO: 0.07 K/UL (ref 0–0.04)
IMM GRANULOCYTES NFR BLD AUTO: 0.5 % (ref 0–0.5)
IMM GRANULOCYTES NFR BLD AUTO: 0.6 % (ref 0–0.5)
INR PPP: 1 (ref 0.8–1.2)
LYMPHOCYTES # BLD AUTO: 1.5 K/UL (ref 1–4.8)
LYMPHOCYTES # BLD AUTO: 1.6 K/UL (ref 1–4.8)
LYMPHOCYTES NFR BLD: 13.6 % (ref 18–48)
LYMPHOCYTES NFR BLD: 17.2 % (ref 18–48)
MAGNESIUM SERPL-MCNC: 1.9 MG/DL (ref 1.6–2.6)
MCH RBC QN AUTO: 26.4 PG (ref 27–31)
MCH RBC QN AUTO: 26.8 PG (ref 27–31)
MCHC RBC AUTO-ENTMCNC: 31.2 G/DL (ref 32–36)
MCHC RBC AUTO-ENTMCNC: 31.9 G/DL (ref 32–36)
MCV RBC AUTO: 84 FL (ref 82–98)
MCV RBC AUTO: 85 FL (ref 82–98)
MONOCYTES # BLD AUTO: 0.8 K/UL (ref 0.3–1)
MONOCYTES # BLD AUTO: 1 K/UL (ref 0.3–1)
MONOCYTES NFR BLD: 8.8 % (ref 4–15)
MONOCYTES NFR BLD: 9.5 % (ref 4–15)
NEUTROPHILS # BLD AUTO: 6.8 K/UL (ref 1.8–7.7)
NEUTROPHILS # BLD AUTO: 8.2 K/UL (ref 1.8–7.7)
NEUTROPHILS NFR BLD: 72.1 % (ref 38–73)
NEUTROPHILS NFR BLD: 74.9 % (ref 38–73)
NRBC BLD-RTO: 0 /100 WBC
NRBC BLD-RTO: 0 /100 WBC
PHOSPHATE SERPL-MCNC: 2.5 MG/DL (ref 2.7–4.5)
PLATELET # BLD AUTO: 191 K/UL (ref 150–350)
PLATELET # BLD AUTO: 194 K/UL (ref 150–350)
PMV BLD AUTO: 9.4 FL (ref 9.2–12.9)
PMV BLD AUTO: 9.5 FL (ref 9.2–12.9)
POTASSIUM SERPL-SCNC: 3.9 MMOL/L (ref 3.5–5.1)
PROT SERPL-MCNC: 4.6 G/DL (ref 6–8.4)
PROTHROMBIN TIME: 11 SEC (ref 9–12.5)
RBC # BLD AUTO: 2.54 M/UL (ref 4–5.4)
RBC # BLD AUTO: 3.21 M/UL (ref 4–5.4)
SODIUM SERPL-SCNC: 140 MMOL/L (ref 136–145)
TRANS ERYTHROCYTES VOL PATIENT: NORMAL ML
VANCOMYCIN SERPL-MCNC: 12.4 UG/ML
WBC # BLD AUTO: 10.92 K/UL (ref 3.9–12.7)
WBC # BLD AUTO: 9.46 K/UL (ref 3.9–12.7)

## 2020-08-08 PROCEDURE — 25000242 PHARM REV CODE 250 ALT 637 W/ HCPCS: Performed by: STUDENT IN AN ORGANIZED HEALTH CARE EDUCATION/TRAINING PROGRAM

## 2020-08-08 PROCEDURE — C9113 INJ PANTOPRAZOLE SODIUM, VIA: HCPCS | Performed by: PHYSICIAN ASSISTANT

## 2020-08-08 PROCEDURE — 83735 ASSAY OF MAGNESIUM: CPT

## 2020-08-08 PROCEDURE — 94640 AIRWAY INHALATION TREATMENT: CPT

## 2020-08-08 PROCEDURE — 85025 COMPLETE CBC W/AUTO DIFF WBC: CPT

## 2020-08-08 PROCEDURE — 85610 PROTHROMBIN TIME: CPT

## 2020-08-08 PROCEDURE — 99233 SBSQ HOSP IP/OBS HIGH 50: CPT | Mod: ,,, | Performed by: INTERNAL MEDICINE

## 2020-08-08 PROCEDURE — 63600175 PHARM REV CODE 636 W HCPCS: Performed by: PHYSICIAN ASSISTANT

## 2020-08-08 PROCEDURE — 11000001 HC ACUTE MED/SURG PRIVATE ROOM

## 2020-08-08 PROCEDURE — P9021 RED BLOOD CELLS UNIT: HCPCS

## 2020-08-08 PROCEDURE — S0030 INJECTION, METRONIDAZOLE: HCPCS | Performed by: PHYSICIAN ASSISTANT

## 2020-08-08 PROCEDURE — 99233 PR SUBSEQUENT HOSPITAL CARE,LEVL III: ICD-10-PCS | Mod: ,,, | Performed by: INTERNAL MEDICINE

## 2020-08-08 PROCEDURE — 25000003 PHARM REV CODE 250

## 2020-08-08 PROCEDURE — 36415 COLL VENOUS BLD VENIPUNCTURE: CPT

## 2020-08-08 PROCEDURE — 80053 COMPREHEN METABOLIC PANEL: CPT

## 2020-08-08 PROCEDURE — 94761 N-INVAS EAR/PLS OXIMETRY MLT: CPT

## 2020-08-08 PROCEDURE — 25000003 PHARM REV CODE 250: Performed by: INTERNAL MEDICINE

## 2020-08-08 PROCEDURE — 25000003 PHARM REV CODE 250: Performed by: PHYSICIAN ASSISTANT

## 2020-08-08 PROCEDURE — 85014 HEMATOCRIT: CPT | Mod: 91

## 2020-08-08 PROCEDURE — 25000003 PHARM REV CODE 250: Performed by: NURSE PRACTITIONER

## 2020-08-08 PROCEDURE — 80202 ASSAY OF VANCOMYCIN: CPT

## 2020-08-08 PROCEDURE — 63600175 PHARM REV CODE 636 W HCPCS

## 2020-08-08 PROCEDURE — 25000242 PHARM REV CODE 250 ALT 637 W/ HCPCS: Performed by: INTERNAL MEDICINE

## 2020-08-08 PROCEDURE — 84100 ASSAY OF PHOSPHORUS: CPT

## 2020-08-08 PROCEDURE — 85018 HEMOGLOBIN: CPT | Mod: 91

## 2020-08-08 RX ORDER — HYDROCODONE BITARTRATE AND ACETAMINOPHEN 500; 5 MG/1; MG/1
TABLET ORAL
Status: DISCONTINUED | OUTPATIENT
Start: 2020-08-08 | End: 2020-08-09 | Stop reason: HOSPADM

## 2020-08-08 RX ORDER — TALC
6 POWDER (GRAM) TOPICAL NIGHTLY PRN
Status: DISCONTINUED | OUTPATIENT
Start: 2020-08-08 | End: 2020-08-09 | Stop reason: HOSPADM

## 2020-08-08 RX ORDER — BUPROPION HYDROCHLORIDE 150 MG/1
150 TABLET ORAL DAILY
Status: DISCONTINUED | OUTPATIENT
Start: 2020-08-08 | End: 2020-08-09 | Stop reason: HOSPADM

## 2020-08-08 RX ORDER — VANCOMYCIN HCL IN 5 % DEXTROSE 1.25 G/25
1250 PLASTIC BAG, INJECTION (ML) INTRAVENOUS
Status: DISCONTINUED | OUTPATIENT
Start: 2020-08-08 | End: 2020-08-08

## 2020-08-08 RX ORDER — PANTOPRAZOLE SODIUM 40 MG/1
40 TABLET, DELAYED RELEASE ORAL DAILY
Status: DISCONTINUED | OUTPATIENT
Start: 2020-08-09 | End: 2020-08-09 | Stop reason: HOSPADM

## 2020-08-08 RX ORDER — ACETAMINOPHEN 325 MG/1
650 TABLET ORAL EVERY 6 HOURS PRN
Status: DISCONTINUED | OUTPATIENT
Start: 2020-08-08 | End: 2020-08-09 | Stop reason: HOSPADM

## 2020-08-08 RX ADMIN — SODIUM CHLORIDE: 0.9 INJECTION, SOLUTION INTRAVENOUS at 11:08

## 2020-08-08 RX ADMIN — BUPROPION HYDROCHLORIDE 150 MG: 150 TABLET, FILM COATED, EXTENDED RELEASE ORAL at 11:08

## 2020-08-08 RX ADMIN — DIPHENHYDRAMINE HYDROCHLORIDE 25 MG: 25 CAPSULE ORAL at 11:08

## 2020-08-08 RX ADMIN — IPRATROPIUM BROMIDE AND ALBUTEROL SULFATE 3 ML: .5; 3 SOLUTION RESPIRATORY (INHALATION) at 07:08

## 2020-08-08 RX ADMIN — CIPROFLOXACIN 200 MG: 2 INJECTION, SOLUTION INTRAVENOUS at 12:08

## 2020-08-08 RX ADMIN — METRONIDAZOLE 500 MG: 500 INJECTION, SOLUTION INTRAVENOUS at 04:08

## 2020-08-08 RX ADMIN — MELATONIN TAB 3 MG 6 MG: 3 TAB at 09:08

## 2020-08-08 RX ADMIN — FLUTICASONE FUROATE AND VILANTEROL TRIFENATATE 1 PUFF: 100; 25 POWDER RESPIRATORY (INHALATION) at 07:08

## 2020-08-08 RX ADMIN — DEXTROSE MONOHYDRATE 1250 MG: 50 INJECTION, SOLUTION INTRAVENOUS at 08:08

## 2020-08-08 RX ADMIN — CIPROFLOXACIN 200 MG: 2 INJECTION, SOLUTION INTRAVENOUS at 01:08

## 2020-08-08 RX ADMIN — MONTELUKAST 10 MG: 10 TABLET, FILM COATED ORAL at 09:08

## 2020-08-08 RX ADMIN — DIPHENHYDRAMINE HYDROCHLORIDE 25 MG: 25 CAPSULE ORAL at 04:08

## 2020-08-08 RX ADMIN — METRONIDAZOLE 500 MG: 500 INJECTION, SOLUTION INTRAVENOUS at 09:08

## 2020-08-08 RX ADMIN — ACETAMINOPHEN 650 MG: 325 TABLET ORAL at 11:08

## 2020-08-08 RX ADMIN — TIOTROPIUM BROMIDE 18 MCG: 18 CAPSULE ORAL; RESPIRATORY (INHALATION) at 07:08

## 2020-08-08 RX ADMIN — METRONIDAZOLE 500 MG: 500 INJECTION, SOLUTION INTRAVENOUS at 12:08

## 2020-08-08 RX ADMIN — PANTOPRAZOLE SODIUM 40 MG: 40 INJECTION, POWDER, LYOPHILIZED, FOR SOLUTION INTRAVENOUS at 08:08

## 2020-08-08 NOTE — ASSESSMENT & PLAN NOTE
- recently diagnosed mesenteric ischemia   - s/p iliac vein stenting 3 weeks ago  - originally on Xarelto, now on Eliquis , holding due to bleeding  - repeat lactic acid improved  -continue ivf  - patient had leukocytosis on admit, also h/o steroid injection and medrol dose pack last week  - resolved wbc  - blood cx negative

## 2020-08-08 NOTE — PROGRESS NOTES
Pharmacokinetic Assessment Follow Up: IV Vancomycin    Vancomycin serum concentration assessment(s):    The random level was drawn correctly and can be used to guide therapy at this time. The measurement is within the desired definitive target range of 10 to 20 mcg/mL.    Vancomycin Regimen Plan:    Change regimen to Vancomycin 1250 mg IV every 24 hours with next serum trough concentration measured at 0730 prior to 3rd dose on 8/10/20    Drug levels (last 3 results):  Recent Labs   Lab Result Units 08/08/20  0540   Vancomycin, Random ug/mL 12.4       Pharmacy will continue to follow and monitor vancomycin.    Please contact pharmacy at extension [phone] for questions regarding this assessment.    Thank you for the consult,   Lavell Browning       Patient brief summary:  Caryn Rodriguez is a 70 y.o. female initiated on antimicrobial therapy with IV Vancomycin for treatment of bacteremia    The patient's current regimen is [phone]    Drug Allergies:   Review of patient's allergies indicates:   Allergen Reactions    Codeine     Iodine and iodide containing products      Patient broke out in hives after last receiving Iodine    Metolazone      Causes dizziness, palpitations, nausea    Omnipaque [iohexol]      Pt states she had rash after returning home from last  Steroid injection, though no rash noted before discharge from PACU    Penicillins     Potassium     Sulfa (sulfonamide antibiotics)     Sulfone        Actual Body Weight:   82.4 kg    Renal Function:   Estimated Creatinine Clearance: 43.7 mL/min (based on SCr of 1.2 mg/dL).,     Dialysis Method (if applicable):  N/A    CBC (last 72 hours):  Recent Labs   Lab Result Units 08/06/20  1449 08/06/20  1727 08/06/20  2251 08/07/20  0234 08/07/20  0639 08/07/20  1008 08/07/20  1406 08/07/20  1803 08/07/20  2324 08/08/20  0540   WBC K/uL 11.63  --  44.94* 25.26*  25.26* 15.83* 14.79* 12.89*  --   --  9.46   Hemoglobin g/dL 9.9* 9.7* 9.6* 9.8*  9.8* 8.7* 8.7* 8.1*  7.5* 7.5* 6.8*  6.7*   Hematocrit % 31.7*  --  30.8* 31.3*  31.3* 27.7* 27.2* 25.6* 24.2* 23.7* 21.8*  21.5*   Platelets K/uL 433*  --  337 304  304 249 258 233  --   --  194   Gran% % 74.3*  --  88.0* 94.0*  94.0* 80.8* 76.4* 76.7*  --   --  72.1   Lymph% % 16.6*  --  9.0* 1.0*  1.0* 12.5* 14.3* 14.4*  --   --  17.2*   Mono% % 7.4  --  3.0* 3.0*  3.0* 5.3 7.9 7.2  --   --  8.8   Eosinophil% % 0.9  --  0.0 1.0  1.0 0.1 0.3 0.6  --   --  1.1   Basophil% % 0.3  --  0.0 1.0  1.0 0.4 0.4 0.5  --   --  0.3   Differential Method  Automated  --  Manual Automated  Automated Automated Automated Automated  --   --  Automated       Metabolic Panel (last 72 hours):  Recent Labs   Lab Result Units 08/06/20  1449 08/07/20  0001 08/07/20  0234 08/08/20  0540   Sodium mmol/L 137  --  137 140   Sodium Urine Random mmol/L  --  25  --   --    Potassium mmol/L 4.3  --  4.9 3.9   Chloride mmol/L 103  --  108 114*   CO2 mmol/L 23  --  19* 18*   Glucose mg/dL 137*  --  144* 91   Glucose, UA   --  Negative  --   --    BUN, Bld mg/dL 38*  --  39* 20   Creatinine mg/dL 1.57*  --  1.8* 1.2   Creatinine, Random Ur mg/dL  --  116.4  --   --    Albumin g/dL 4.1  --  2.8* 2.5*   Total Bilirubin mg/dL 0.4  --  0.7 0.4   Alkaline Phosphatase U/L 90  --  69 54*   AST U/L 21  --  18 14   ALT U/L 16  --  14 11   Magnesium mg/dL  --   --   --  1.9   Phosphorus mg/dL  --   --   --  2.5*       Vancomycin Administrations:  vancomycin given in the last 96 hours                     vancomycin 2 g in dextrose 5 % 500 mL IVPB (mg) 2,000 mg New Bag 08/07/20 0433                    Microbiologic Results:  Microbiology Results (last 7 days)       Procedure Component Value Units Date/Time    Clostridium difficile EIA [[id number]] Collected: 08/07/20 1155    Order Status: Completed Specimen: Stool Updated: 08/07/20 0586     C. diff Antigen Negative     C difficile Toxins A+B, EIA Negative     Comment: Testing not recommended for children <24 months old.        Blood culture [[id number]] Collected: 08/07/20 0014    Order Status: Completed Specimen: Blood from Antecubital, Left Arm Updated: 08/07/20 1715     Blood Culture, Routine No Growth to date    Blood culture [[id number]] Collected: 08/07/20 0014    Order Status: Completed Specimen: Blood Updated: 08/07/20 1715     Blood Culture, Routine No Growth to date    Urine culture [[id number]] Collected: 08/07/20 0001    Order Status: No result Specimen: Urine Updated: 08/07/20 0033

## 2020-08-08 NOTE — NURSING
Pt remains free from fall and injuries. Vss. Pt had 3 bm overnight. The first one is bloody stool, and the succeeding bowel movement is dark brown. 6am H/H relayed to eicu md. Awaiting orders. Will continue to monitor.

## 2020-08-08 NOTE — NURSING TRANSFER
Nursing Transfer Note      8/8/2020     Transfer To: 316    Transfer via wheelchair    Transfer with cardiac monitoring    Medicine sent w/ patient to     Transported by transport team and TOMAS Montero    Chart send with patient: Yes    Notified: spouse    Patient reassessed at: *8/8/20 17:45   Upon arrival to floor: cardiac monitor applied, patient oriented to room, call bell in reach and bed in lowest position    Report given to TOMAS Price

## 2020-08-08 NOTE — NURSING
Pt arrived to room 316 via wheelchair from ICU  Pt ambulated to bathroom.  VSS on room air. Pt has no complaint of pain or discomfort.  In bed in low position with call light in reach,  Orientation to room done with  at bedside.  IVF infusing per MD order. Will continue to monitor.

## 2020-08-08 NOTE — SUBJECTIVE & OBJECTIVE
Interval History: had 3 bloody bm overnight but smaller, 2 small black bm this am, overall abdominal pain better, c/o pain in back, chronic     Review of Systems   Constitutional: Negative for chills and fever.   HENT: Negative for sinus pain and trouble swallowing.    Respiratory: Negative for cough and shortness of breath.    Cardiovascular: Negative for chest pain and leg swelling.   Gastrointestinal: Positive for abdominal pain, blood in stool and diarrhea. Negative for nausea and vomiting.   Genitourinary: Negative for dysuria and hematuria.   Musculoskeletal: Positive for arthralgias and back pain.   Skin: Negative for rash.   Neurological: Positive for headaches.   Psychiatric/Behavioral: Negative for confusion.     Objective:     Vital Signs (Most Recent):  Temp: 97.6 °F (36.4 °C) (08/08/20 1100)  Pulse: 88 (08/08/20 1300)  Resp: 20 (08/08/20 1300)  BP: 135/63 (08/08/20 1300)  SpO2: 96 % (08/08/20 1300) Vital Signs (24h Range):  Temp:  [97.6 °F (36.4 °C)-98.6 °F (37 °C)] 97.6 °F (36.4 °C)  Pulse:  [] 88  Resp:  [15-31] 20  SpO2:  [95 %-100 %] 96 %  BP: (103-149)/(55-86) 135/63     Weight: 82.4 kg (181 lb 10.5 oz)  Body mass index is 32.18 kg/m².    Intake/Output Summary (Last 24 hours) at 8/8/2020 1418  Last data filed at 8/8/2020 1343  Gross per 24 hour   Intake 3086.25 ml   Output 1930 ml   Net 1156.25 ml      Physical Exam  Vitals signs reviewed.   Constitutional:       General: She is not in acute distress.     Appearance: She is obese.   HENT:      Head: Normocephalic and atraumatic.   Eyes:      Extraocular Movements: Extraocular movements intact.      Pupils: Pupils are equal, round, and reactive to light.   Neck:      Musculoskeletal: Normal range of motion and neck supple.   Cardiovascular:      Rate and Rhythm: Normal rate and regular rhythm.   Pulmonary:      Effort: Pulmonary effort is normal. No respiratory distress.      Breath sounds: Normal breath sounds.   Abdominal:      General:  Bowel sounds are normal. There is no distension.      Palpations: Abdomen is soft.      Tenderness: There is abdominal tenderness (diffuse tenderness, more in epigastric and left abdomen).   Musculoskeletal: Normal range of motion.         General: No swelling.   Skin:     General: Skin is warm.   Neurological:      General: No focal deficit present.      Mental Status: She is alert and oriented to person, place, and time.      Cranial Nerves: No cranial nerve deficit.   Psychiatric:         Mood and Affect: Mood normal.         Behavior: Behavior normal.         Significant Labs:   BMP:   Recent Labs   Lab 08/08/20  0540   GLU 91      K 3.9   *   CO2 18*   BUN 20   CREATININE 1.2   CALCIUM 7.8*   MG 1.9     CBC:   Recent Labs   Lab 08/07/20  1008 08/07/20  1406 08/07/20  1803 08/07/20  2324 08/08/20  0540   WBC 14.79* 12.89*  --   --  9.46   HGB 8.7* 8.1* 7.5* 7.5* 6.8*  6.7*   HCT 27.2* 25.6* 24.2* 23.7* 21.8*  21.5*    233  --   --  194       Significant Imaging: I have reviewed all pertinent imaging results/findings within the past 24 hours.

## 2020-08-08 NOTE — PROGRESS NOTES
Chief Complaint / Reason for Consult: GI bleed. ? Mesenteric ischemia      ROS: Pt feeling better overall. She did have some blood in her stool described as dark. She c/o back pain due to not being able to ambulate. No n/v. No appetite. No CP/ SOB. Afebrile. S/p 1 unit PRBC- awaiting repeat H/H     Patient Vitals for the past 24 hrs:   BP Temp Temp src Pulse Resp SpO2   08/08/20 1000 (!) 119/56 -- -- 94 (!) 26 96 %   08/08/20 0900 (!) 146/64 98.5 °F (36.9 °C) -- 98 (!) 25 96 %   08/08/20 0800 131/63 -- -- 104 (!) 22 97 %   08/08/20 0742 -- -- -- 97 (!) 21 98 %   08/08/20 0700 (!) 120/56 98.6 °F (37 °C) -- 102 (!) 22 98 %   08/08/20 0630 134/63 98.5 °F (36.9 °C) Oral 106 (!) 24 98 %   08/08/20 0609 134/63 98.5 °F (36.9 °C) Oral 102 19 97 %   08/08/20 0600 134/63 -- -- 96 19 96 %   08/08/20 0500 (!) 117/57 -- -- 100 15 98 %   08/08/20 0430 (!) 104/55 -- -- 94 20 96 %   08/08/20 0400 (!) 103/59 -- -- 102 20 97 %   08/08/20 0330 120/60 -- -- 100 19 98 %   08/08/20 0305 -- 98.6 °F (37 °C) Oral 100 (!) 22 96 %   08/08/20 0230 (!) 114/56 -- -- 100 19 96 %   08/08/20 0200 (!) 121/56 -- -- 106 (!) 21 97 %   08/08/20 0130 (!) 117/56 -- -- 106 (!) 25 98 %   08/08/20 0100 129/60 -- -- 108 19 96 %   08/08/20 0030 124/76 -- -- 108 17 97 %   08/08/20 0000 117/62 -- -- 104 17 98 %   08/07/20 2330 122/78 98.4 °F (36.9 °C) Oral 104 20 97 %   08/07/20 2300 120/66 -- -- 102 19 97 %   08/07/20 2230 (!) 117/58 -- -- 102 18 95 %   08/07/20 2200 120/64 -- -- 106 20 96 %   08/07/20 2130 137/69 -- -- 106 15 97 %   08/07/20 2100 (!) 144/67 -- -- 104 18 96 %   08/07/20 2030 138/60 -- -- 104 16 98 %   08/07/20 2000 (!) 139/59 -- -- 106 (!) 21 98 %   08/07/20 1930 131/62 98.1 °F (36.7 °C) Axillary 98 18 98 %   08/07/20 1900 138/61 -- -- 102 20 98 %   08/07/20 1800 110/67 -- -- 100 16 98 %   08/07/20 1700 130/62 -- -- 98 17 100 %   08/07/20 1630 (!) 124/59 -- -- 98 17 98 %   08/07/20 1600 (!) 145/60 -- -- 100 (!) 21 98 %   08/07/20 1540 (!)  149/67 98.1 °F (36.7 °C) -- 98 19 98 %   08/07/20 1503 (!) 122/56 -- -- 92 18 96 %   08/07/20 1433 122/86 -- -- 100 (!) 21 98 %   08/07/20 1400 (!) 146/98 -- -- 100 (!) 22 99 %   08/07/20 1333 129/70 -- -- 98 (!) 22 99 %   08/07/20 1300 (!) 140/65 -- -- 94 17 96 %   08/07/20 1230 (!) 133/59 -- -- 96 17 99 %   08/07/20 1203 (!) 109/57 -- -- 96 12 100 %   08/07/20 1130 131/65 99 °F (37.2 °C) Oral 98 19 100 %   08/07/20 1116 -- -- -- 103 17 99 %   08/07/20 1100 124/68 -- -- 94 11 100 %   08/07/20 1033 132/63 -- -- 96 16 99 %       Physical Exam:  Gen - Well developed, well nourished, no apparent distress  HEENT - Anicteric  CV - S1, S2, no murmurs/rubs  Lungs - CTA-B, normal excursion  Abd - Soft, NT, ND,decreased BS, no r/g  Ext - No c/c/e  Neuro - No asterixis  Skin: ecchymoses BUE  Labs:  Recent Labs   Lab 08/08/20  0540   WBC 9.46   RBC 2.54*   HGB 6.8*  6.7*   HCT 21.8*  21.5*      MCV 85   MCH 26.4*   MCHC 31.2*     Recent Labs   Lab 08/08/20  0540   CALCIUM 7.8*   PROT 4.6*      K 3.9   CO2 18*   *   BUN 20   CREATININE 1.2   ALKPHOS 54*   ALT 11   AST 14   BILITOT 0.4     Recent Labs   Lab 08/08/20  0540   INR 1.0       Imaging:  Reviewed    Assessment:  Patient is a 71 yo on oral anticoagulation secondary to recent iliac stents who was transferred for [facility] for new onset hematochezia.    CT with diverticulosis.  U/S Duplex with no stenosis of SMA/Celiac.Pt with resolving leukocytosis on cipro/ flagyl. ? Mild ischemic colitis/ diverticulitis/ translocation with relative hypotension at [facility]. No evidence of mesenteric ischemia. Bleeding appears to be resolving     Plan:  1. Advacne diet slowly  2.  Serial Hb/Hct, transfuse per primary team  3.  Continue holding oral anticoagulation  4.  Agree with broad spectrum antibx  5.  If patient re-bleeds, would recommend angiography with IR consultation for intervention.     Will follow

## 2020-08-08 NOTE — PLAN OF CARE
Patient in no apparent distress. Sat's  98 % on room air. Aerosol treatments given Q 12 . Will continue to monitor.

## 2020-08-08 NOTE — CONSULTS
[facility]  Consult Note      Date of Consultation:8/8/2020    Reason for Consult:  Abdominal pain  SUBJECTIVE:     History of Present Illness:  The patient is a 70-year-old female transferred from [facility] for evaluation of abdominal pain and lower GI bleed.  Patient has an extensive history of peripheral vascular disease and had an iliac stent placed 3 weeks ago.  She was placed on Eliquis after the stent was deployed.  Patient states that she has felt lightheaded and weak since that time as well.  CT scan of the abdomen on admission showed no evidence of bowel wall thickening or inflammation.  Patient was seen to have diverticulosis and cholelithiasis.  In addition a mesenteric ultrasound was performed which showed no evidence of flow restriction.    Since arrival the patient is symptoms have resolved and the red blood per rectum is now dark maroon.  Patient is tolerating a clear liquid diet and has no GI symptoms or pain.    Review of patient's allergies indicates:   Allergen Reactions    Codeine     Iodine and iodide containing products      Patient broke out in hives after last receiving Iodine    Metolazone      Causes dizziness, palpitations, nausea    Omnipaque [iohexol]      Pt states she had rash after returning home from last  Steroid injection, though no rash noted before discharge from PACU    Penicillins     Potassium     Sulfa (sulfonamide antibiotics)     Sulfone        Past Medical History:   Diagnosis Date    CKD (chronic kidney disease), stage III     COPD (chronic obstructive pulmonary disease)     Coronary artery disease     Disorder of kidney and ureter     GERD (gastroesophageal reflux disease)     Hypertension     PONV (postoperative nausea and vomiting)      Past Surgical History:   Procedure Laterality Date    Double Bypass      HYSTERECTOMY      INJECTION OF ANESTHETIC AGENT AROUND MEDIAL BRANCH NERVES INNERVATING LUMBAR FACET JOINT  Right 3/13/2020    Procedure: BLOCK, NERVE, FACET JOINT, LUMBAR, MEDIAL BRANCH;  Surgeon: Kam Baker MD;  Location: [facility] OR;  Service: Pain Management;  Laterality: Right;  L2,3,4,5    INSERTION OF IMPLANTABLE LOOP RECORDER N/A 3/11/2019    Procedure: Insertion, Implantable Loop Recorder;  Surgeon: Art Marquez MD;  Location: [facility] CATH LAB;  Service: Cardiology;  Laterality: N/A;    INSERTION OF MULTIFOCAL INTRAOCULAR LENS Right 11/20/2019    Procedure: INSERTION, IOL, MULTIFOCAL;  Surgeon: Larry Leal MD;  Location: [facility] OR;  Service: Ophthalmology;  Laterality: Right;    PHACOEMULSIFICATION OF CATARACT Right 11/20/2019    Procedure: PHACOEMULSIFICATION, CATARACT;  Surgeon: Larry Leal MD;  Location: [facility] OR;  Service: Ophthalmology;  Laterality: Right;    TRANSFORAMINAL EPIDURAL INJECTION OF STEROID Right 1/31/2020    Procedure: INJECTION, STEROID, EPIDURAL, TRANSFORAMINAL APPROACH;  Surgeon: Kam Baker MD;  Location: [facility] OR;  Service: Pain Management;  Laterality: Right;  L4, L5     Family History   Problem Relation Age of Onset    Cancer Mother     Stroke Mother     Stroke Father     Cancer Sister     Heart disease Brother     Dementia Maternal Aunt     Cancer Maternal Uncle     Hypertension Paternal Aunt     Cancer Paternal Uncle     Dementia Maternal Grandmother      Social History     Tobacco Use    Smoking status: Former Smoker     Types: Cigarettes    Smokeless tobacco: Never Used   Substance Use Topics    Alcohol use: No    Drug use: No            Physical Exam:  Afebrile  Vital signs stable  Well-developed well-nourished female in no acute distress  HEENT-anicteric, extraocular movements intact, atraumatic  Neck-supple, trachea midline, no bruits  Chest-clear  Heart-regular rate and rhythm  Abdomen-soft, nontender, nondistended, positive bowel sounds  Extremities-normal dorsalis pedis pulses bilaterally, no tenderness  Neuro-grossly  intact    Impression/plan:  Lower GI bleed, no evidence of immediately compelling surgical abdomen.  Lower GI bleeding appears to be minimal at this time.  Will need follow-up endoscopy.        Thank you for the opportunity of seeing Caryn Rodriguez in consultation

## 2020-08-08 NOTE — ASSESSMENT & PLAN NOTE
- last lipid panel:   Results for SOCORRO BALLARD (MRN [id number]) as of 8/6/2020 20:41   Ref. Range 2/18/2019 10:20   Cholesterol Latest Ref Range: 120 - 199 mg/dL 136   HDL Latest Ref Range: 40 - 75 mg/dL 43   Hdl/Cholesterol Ratio Latest Ref Range: 20.0 - 50.0 % 31.6   LDL Cholesterol External Latest Ref Range: 63.0 - 159.0 mg/dL 57.4 (L)   Non HDL Chol. (LDL+VLDL) Unknown    Non-HDL Cholesterol Latest Units: mg/dL 93   Total Cholesterol/HDL Ratio Latest Ref Range: 2.0 - 5.0  3.2   Triglycerides Latest Ref Range: 30 - 150 mg/dL 178 (H)      do not see statin on home list

## 2020-08-08 NOTE — ASSESSMENT & PLAN NOTE
- appears stable, no exacerbations at present   - continue home meds , on ellipta  At home, continue  Breo,spiriva,  duonebs  - monitor

## 2020-08-08 NOTE — PROGRESS NOTES
Progress Note  Nephrology    Admit Date: 8/6/2020   LOS: 2 days     SUBJECTIVE:     Follow-up For: ANDRAE on CKD Stage 3    Interval History: She had 3 BMs overnight, one which was bloody.  This morning she has had a dark black stool.  One unit PRBCs currently infusing.  Didn't sleep much.  Has poor appetite.    OBJECTIVE:     Vital Signs (Most Recent)  Temp: 98.6 °F (37 °C) (08/08/20 0700)  Pulse: 104 (08/08/20 0800)  Resp: (!) 22 (08/08/20 0800)  BP: 131/63 (08/08/20 0800)  SpO2: 97 % (08/08/20 0800)    Vital Signs Range (Last 24H):  Temp:  [98.1 °F (36.7 °C)-99 °F (37.2 °C)]   Pulse:  []   Resp:  [11-25]   BP: (103-149)/(55-98)   SpO2:  [95 %-100 %]     Review of Systems:   Constitutional: no fever or chills   Respiratory: no cough or shortness of breath   Cardiovascular: no chest pain or palpitations   Gastrointestinal: no nausea or vomiting, no abdominal pain. + Black and bloody stools.  Genitourinary: no hematuria or dysuria   Musculoskeletal: no arthralgias or myalgias   Neurological: no seizures or tremors    Physical Exam:  Gen: AAOx3, NAD, frail  HEENT: mmm, sclera anicteric  CV: RRR, no m/r  Resp: CTAB, no rales or wheezes  GI: soft, obese, NTTP  Skin: warm, dry  Extr: no edema    Laboratory:  Recent Labs   Lab 08/06/20  1449 08/07/20  0234 08/08/20  0540    137 140   K 4.3 4.9 3.9    108 114*   CO2 23 19* 18*   BUN 38* 39* 20   CREATININE 1.57* 1.8* 1.2   CALCIUM 9.1 8.2* 7.8*   PHOS  --   --  2.5*     Recent Labs   Lab 08/07/20  1008 08/07/20  1406 08/07/20  1803 08/07/20  2324 08/08/20  0540   WBC 14.79* 12.89*  --   --  9.46   HGB 8.7* 8.1* 7.5* 7.5* 6.8*  6.7*   HCT 27.2* 25.6* 24.2* 23.7* 21.8*  21.5*    233  --   --  194        Diagnostic Results:  Labs: Reviewed  X-Ray: Reviewed    ASSESSMENT/PLAN:     1. ANDRAE on CKD III (baseline 1.5) secondary to ABLA/hypotension from GIB: Cr trending down with IVFs and PRBCs.  Now normotensive.   Extensive hx of renal disease and followed  by Dr. Noyola.  Only 1 functional malformed right kidney and left kidney atrophy.  Aggressive w/up with negative serologic findings except mild COLLEEN+ but negative reflex.  Also hx of stone and encouraged adequate hydration at home. Off NSAIDs now.  Hold ACE/ARB for now.  Renally dose meds, avoid nephrotoxins, and monitor I/O's closely.  2. Lower GI Bleed from OAC:  Still bleeding.  Will follow GI recs on intervention.  Transfusion in progress.  3. Mild NAGMA:  Should continue to improve with blood and IVFs.  4. Mesenteric ischemia with recent stenting:  Reviewed U/S which is unremarkable.    Thank you for allowing me to participate in the care of this patient.      Quinten Strange MD  Nephrology

## 2020-08-08 NOTE — PROGRESS NOTES
[facility] Hospital Medicine  Progress Note    Patient Name: Caryn Rodriguez  MRN: [id number]  Patient Class: IP- Inpatient   Admission Date: 8/6/2020  Length of Stay: 2 days  Attending Physician: Ann Malone MD  Primary Care Provider: Sterling Wellington MD        Subjective:     Principal Problem:Acute GI bleeding        HPI:  Ms. Crayn Rodriguez is a 70 y.o. female, with PMH of CAD, Mesenteric ischemia (s/p iliac vein stenting 3 weeks ago, on Eliquis), COPD, HTN, GERD, who presented to [facility] Ed on 8/6/20 after a bloody BM that morning. She described the BM as bright red blood in diarrhea, which occurred multiple times. This is associated with diffuse abdominal pain. She states that she first started with abdominal pain when she started taking Eliquis 3 weeks ago. It has been associated with nausea and diarrhea. She states she had an angiogram on 7/17/20 and had stents placed. Initially she continued on the Eliquis, but the abdominal pain has become to much to take it, and she was changed to Xarelto two days aog. Her last dose of Eliquis was 8/5/20 when she started taking Xarelto, she has taken two doses of Xarelto since beginning this medication. Initial HGB in ED was 9.9, and upon repeat was 9.7. After another bloody BM in the ED, PRBC transfusion was initiated. Lactic Acid francisca from 2.6 to 4.4 while in the ED. Blood cultures pending, CXR and UA ordered upon admission. She was admitted to inpatient status to the ICU.     Overview/Hospital Course:  Patient was started on broad spectrum abx with cipro, flagyl, vanc, aztreonam.Aztreonam was dcd.Repeat lactic acid better.Wbc improving and back to normal.Blood cx negative, vancomycin dcd.    Interval History: had 3 bloody bm overnight but smaller, 2 small black bm this am, overall abdominal pain better, c/o pain in back, chronic     Review of Systems   Constitutional: Negative for chills and fever.   HENT: Negative for sinus pain and trouble  swallowing.    Respiratory: Negative for cough and shortness of breath.    Cardiovascular: Negative for chest pain and leg swelling.   Gastrointestinal: Positive for abdominal pain, blood in stool and diarrhea. Negative for nausea and vomiting.   Genitourinary: Negative for dysuria and hematuria.   Musculoskeletal: Positive for arthralgias and back pain.   Skin: Negative for rash.   Neurological: Positive for headaches.   Psychiatric/Behavioral: Negative for confusion.     Objective:     Vital Signs (Most Recent):  Temp: 97.6 °F (36.4 °C) (08/08/20 1100)  Pulse: 88 (08/08/20 1300)  Resp: 20 (08/08/20 1300)  BP: 135/63 (08/08/20 1300)  SpO2: 96 % (08/08/20 1300) Vital Signs (24h Range):  Temp:  [97.6 °F (36.4 °C)-98.6 °F (37 °C)] 97.6 °F (36.4 °C)  Pulse:  [] 88  Resp:  [15-31] 20  SpO2:  [95 %-100 %] 96 %  BP: (103-149)/(55-86) 135/63     Weight: 82.4 kg (181 lb 10.5 oz)  Body mass index is 32.18 kg/m².    Intake/Output Summary (Last 24 hours) at 8/8/2020 1418  Last data filed at 8/8/2020 1343  Gross per 24 hour   Intake 3086.25 ml   Output 1930 ml   Net 1156.25 ml      Physical Exam  Vitals signs reviewed.   Constitutional:       General: She is not in acute distress.     Appearance: She is obese.   HENT:      Head: Normocephalic and atraumatic.   Eyes:      Extraocular Movements: Extraocular movements intact.      Pupils: Pupils are equal, round, and reactive to light.   Neck:      Musculoskeletal: Normal range of motion and neck supple.   Cardiovascular:      Rate and Rhythm: Normal rate and regular rhythm.   Pulmonary:      Effort: Pulmonary effort is normal. No respiratory distress.      Breath sounds: Normal breath sounds.   Abdominal:      General: Bowel sounds are normal. There is no distension.      Palpations: Abdomen is soft.      Tenderness: There is abdominal tenderness (diffuse tenderness, more in epigastric and left abdomen).   Musculoskeletal: Normal range of motion.         General: No  "swelling.   Skin:     General: Skin is warm.   Neurological:      General: No focal deficit present.      Mental Status: She is alert and oriented to person, place, and time.      Cranial Nerves: No cranial nerve deficit.   Psychiatric:         Mood and Affect: Mood normal.         Behavior: Behavior normal.         Significant Labs:   BMP:   Recent Labs   Lab 08/08/20  0540   GLU 91      K 3.9   *   CO2 18*   BUN 20   CREATININE 1.2   CALCIUM 7.8*   MG 1.9     CBC:   Recent Labs   Lab 08/07/20  1008 08/07/20  1406 08/07/20  1803 08/07/20  2324 08/08/20  0540   WBC 14.79* 12.89*  --   --  9.46   HGB 8.7* 8.1* 7.5* 7.5* 6.8*  6.7*   HCT 27.2* 25.6* 24.2* 23.7* 21.8*  21.5*    233  --   --  194       Significant Imaging: I have reviewed all pertinent imaging results/findings within the past 24 hours.      Assessment/Plan:      * Acute GI bleeding  - Ms. Caryn Rodriguez presented with BRBPR   - s/p 3 unit PRBCs  - GI bleed pathways initiated   - CT in ED showed diverticulosis, no diverticulitis   - GI consulted   - montior & transfuse PRN  - protonix ordered   - likely diverticular bleed, holding xarelto  - cannot do cta due to esther and only single kidney unless significant bleeding, currently seems to be less  - concern for mesenteric ischemia  Though us did not show significant stenosis  - patient started on broad spectrum antibiotics , dc vanc, continue cipro flagyl with ? diverticulitis  - sx consulted  - monitor cbc and if stable, will likely advance diet    Abdominal pain  ? Chronic  Patient had it before procedure and does not feel like improved much  - ct without contrast unremarkable, ? Diverticulitis  - continue abx, prn tylenol for pain      Acute blood loss anemia  S/p 3 units blood  Monitor h/h      Acute renal failure superimposed on stage 3 chronic kidney disease  - suspect 2/2 volume losses   - avoid nephrotoxins, renally dose meds   - patient states "I only have on functioning " "kidney, the other is deformed"   - follows w/ Dr. Destinee Noyola   - renal following  Monitor labs, improved creatinine  - continue ivf    Supratherapeutic INR  - originally on Xarelto, now on Eliquis  - INR elevated at 3.4   - hold anticoagulation give GI bleeding   - inaccurate result with being on anticoagulation  - was given k centra with bleeding    Lactic acidosis  - recently diagnosed mesenteric ischemia   - s/p iliac vein stenting 3 weeks ago  - originally on Xarelto, now on Eliquis , holding due to bleeding  - repeat lactic acid improved  -continue ivf  - patient had leukocytosis on admit, also h/o steroid injection and medrol dose pack last week  - resolved wbc  - blood cx negative      Chronic diastolic heart failure  - last EFCHO 8/31/17:  CONCLUSIONS     1 - Normal left ventricular systolic function (EF 55-60%).     2 - Impaired LV relaxation, normal LAP (grade 1 diastolic dysfunction).     3 - Normal right ventricular systolic function .     4 - Mild mitral regurgitation.     5 - Mild tricuspid regurgitation.   - monitor I&O, daily weights   - holding diuretics 2/2 ongoing blood losses     Abnormal urinalysis  - doubt uti  - Patient denies any symptoms  - monitor        Carotid artery disease  - holding ASA 2/2 gi bleeding       HLD (hyperlipidemia)  - last lipid panel:   Results for SOCORRO BALLARD (MRN [id number]) as of 8/6/2020 20:41   Ref. Range 2/18/2019 10:20   Cholesterol Latest Ref Range: 120 - 199 mg/dL 136   HDL Latest Ref Range: 40 - 75 mg/dL 43   Hdl/Cholesterol Ratio Latest Ref Range: 20.0 - 50.0 % 31.6   LDL Cholesterol External Latest Ref Range: 63.0 - 159.0 mg/dL 57.4 (L)   Non HDL Chol. (LDL+VLDL) Unknown    Non-HDL Cholesterol Latest Units: mg/dL 93   Total Cholesterol/HDL Ratio Latest Ref Range: 2.0 - 5.0  3.2   Triglycerides Latest Ref Range: 30 - 150 mg/dL 178 (H)      do not see statin on home list    COPD (chronic obstructive pulmonary disease)  - appears stable, no exacerbations " at present   - continue home meds , on ellipta  At home, continue  Breo,spiriva,  duonebs  - monitor     Essential hypertension  - BP reported low at end of ED stay, and en route, normotensive now   - hold home meds: carvedilol 12.5 mg BID , aldactone 25 mg   - monitor         VTE Risk Mitigation (From admission, onward)         Ordered     Reason for No Pharmacological VTE Prophylaxis  Once     Question:  Reasons:  Answer:  Active Bleeding    08/06/20 2055     IP VTE HIGH RISK PATIENT  Once      08/06/20 2055     Place sequential compression device  Until discontinued      08/06/20 2055                      Ann Malone MD  Department of Hospital Medicine   [facility]

## 2020-08-08 NOTE — PROGRESS NOTES
[facility]  Critical Care - Medicine  Progress Note    Subjective:       Interval History/Significant Events: bleeding improving, now with tarry stools, no further BRB. Vitals stable. Hb <7, received additional 1 unit PRBC this am.       Objective:     Vital Signs (Most Recent):  Temp: 97.6 °F (36.4 °C) (08/08/20 1100)  Pulse: 92 (08/08/20 1100)  Resp: (!) 21 (08/08/20 1100)  BP: (!) 115/56 (08/08/20 1100)  SpO2: 96 % (08/08/20 1100) Vital Signs (24h Range):  Temp:  [97.6 °F (36.4 °C)-98.6 °F (37 °C)] 97.6 °F (36.4 °C)  Pulse:  [] 92  Resp:  [12-26] 21  SpO2:  [95 %-100 %] 96 %  BP: (103-149)/(55-98) 115/56     Weight: 82.4 kg (181 lb 10.5 oz)  Body mass index is 32.18 kg/m².      Intake/Output Summary (Last 24 hours) at 8/8/2020 1159  Last data filed at 8/8/2020 0933  Gross per 24 hour   Intake 3086.25 ml   Output 1890 ml   Net 1196.25 ml       Physical Exam  Constitutional:       Appearance: Normal appearance.   HENT:      Head: Atraumatic.      Nose: Nose normal.      Mouth/Throat:      Mouth: Mucous membranes are moist.   Eyes:      Conjunctiva/sclera: Conjunctivae normal.      Pupils: Pupils are equal, round, and reactive to light.   Neck:      Musculoskeletal: Normal range of motion.   Cardiovascular:      Rate and Rhythm: Tachycardia present.      Pulses: Normal pulses.   Pulmonary:      Effort: Pulmonary effort is normal.      Breath sounds: Normal breath sounds.   Abdominal:      General: Abdomen is flat. Bowel sounds are normal. There is no distension.      Palpations: Abdomen is soft.      Tenderness: There is no abdominal tenderness. There is no guarding.   Musculoskeletal:         General: No swelling or deformity.   Skin:     General: Skin is warm and dry.      Findings: Bruising present.   Neurological:      Mental Status: She is alert.         Lines/Drains/Airways     Peripheral Intravenous Line                 Peripheral IV - Single Lumen 08/06/20 2100 20 G Left Forearm 1  day         Peripheral IV - Single Lumen 08/07/20 0337 20 G Right Forearm 1 day                Significant Labs:    CBC/Anemia Profile:  Recent Labs   Lab 08/07/20  1008 08/07/20  1406 08/07/20  1803 08/07/20  2324 08/08/20  0540   WBC 14.79* 12.89*  --   --  9.46   HGB 8.7* 8.1* 7.5* 7.5* 6.8*  6.7*   HCT 27.2* 25.6* 24.2* 23.7* 21.8*  21.5*    233  --   --  194   MCV 83 84  --   --  85   RDW 23.7* 23.9*  --   --  23.9*        Chemistries:  Recent Labs   Lab 08/06/20  1449 08/07/20  0234 08/08/20  0540    137 140   K 4.3 4.9 3.9    108 114*   CO2 23 19* 18*   BUN 38* 39* 20   CREATININE 1.57* 1.8* 1.2   CALCIUM 9.1 8.2* 7.8*   ALBUMIN 4.1 2.8* 2.5*   PROT 6.9 5.3* 4.6*   BILITOT 0.4 0.7 0.4   ALKPHOS 90 69 54*   ALT 16 14 11   AST 21 18 14   MG  --   --  1.9   PHOS  --   --  2.5*       Assessment/Plan:     Ms. Caryn Rodriguez is a 70 y.o. female, with PMH of CAD s/p CABG 2005, iliac vein stenting 3 weeks ago (7/17)on Eliquis (pt reports was due to GLADIS, NOT abdominal issues??), severe COPD (FEV1 0.58, 28%), HTN, GERD, who presented to [facility] on 8/6/20 after with BRBPR. She is now improving off AC, still requiring transfusion but less frequent, and abdominal pain has resolved. Per GI likely diverticulitis flare, less likely mesenteric iscemia.     CNS:   No issues     Cardio:   #CAD with h/o CABG 2005  #HTN  #HFpEF  #PAD  - had recent iliac vein stents on 7/17, due to venous insufficiency? Patient states was not due to abdominal issues  - need to hold AC now however concern with holding AC in setting recent stent placement, continue to monitor  - hold antihypertensives at this time due to GI bleed  - caution IVF/volume with h/o HFpEF     Resp:   #Severe COPD:   - PFTs 2019 FEV1 0.58/28%; severe obstruction, at home on trelegy  - no signs exacerbation at this time  - continue home inhaler or hospital equivalent (LAMA/LABA/ICS)   - nebs prn SOB/wheezing  - I/O +, 2/2 blood products, if  desaturating will diurese given HFpEF, so far stable on room air     GI:   #Acute GI bleed:   - does have h.o duodenal ulcer ~ 40 years ago however reports sx under control with home PPI  - denies NSAID use  - likely lower with hematochezia  - never had colonoscopy  - acute drop Hgb down from 13 a few months ago  - INR was 3.4 on admission, received kcentra with imrpvoement INR 1.3, now 1.0  - received total 4 units PRBC  - last bloody BM was ~ 10pm, small volume tarry stools after  - 2 large bore IV in place  - transfuse for Hgb > 7  - continue to trend CBC q6h , INR daily  - GI following  - if rebleeds, CTA with IR consult     Renal:   #ANDRAE on CKD:   - baseline cr ~ 1.5, today 1.2, ANDRAE resolved  - likely due to ATN/hypovolumia  - on continuous fluids  - avoid nephrotoxics, strict I.Os     ID:   #Leukocytosis:   - trending down on abx  - concerning for infection, CT without obvious source, no respiratory sx  - continue cipro/flagyl for abdominal coverage     PPx:   - SCDS, hold chemical ppx due to acute bleed   -continue home PPI, IV formulation    At this time patient stable for floor management. Please reconsult with any additional concerns; page for any assistance.       Mary Kay Arellano MD   PCCM Fellow   Pager: [phone]  Critical Care - Medicine  [facility]

## 2020-08-08 NOTE — ASSESSMENT & PLAN NOTE
- BP reported low at end of ED stay, and en route, normotensive now   - hold home meds: carvedilol 12.5 mg BID , aldactone 25 mg   - monitor

## 2020-08-08 NOTE — ASSESSMENT & PLAN NOTE
? Chronic  Patient had it before procedure and does not feel like improved much  - ct without contrast unremarkable, ? Diverticulitis  - continue abx, prn tylenol for pain

## 2020-08-08 NOTE — ASSESSMENT & PLAN NOTE
- Ms. Caryn Rodriguez presented with BRBPR   - s/p 3 unit PRBCs  - GI bleed pathways initiated   - CT in ED showed diverticulosis, no diverticulitis   - GI consulted   - montior & transfuse PRN  - protonix ordered   - likely diverticular bleed, holding xarelto  - cannot do cta due to esther and only single kidney unless significant bleeding, currently seems to be less  - concern for mesenteric ischemia  Though us did not show significant stenosis  - patient started on broad spectrum antibiotics , dc vanc, continue cipro flagyl with ? diverticulitis  - sx consulted  - monitor cbc and if stable, will likely advance diet

## 2020-08-08 NOTE — EICU
Hg < 7.  On room air. Notes reviewed.    Discussed with bed side RN. Had 2 dark bowels. Dropped Hg from 7.5. got 3 PRBC so far since admission.    1 PRBC ordered.

## 2020-08-08 NOTE — PROGRESS NOTES
Therapy with Vancomycin complete and/or consult discontinued by provider.  Pharmacy will sign off, please re-consult as needed.    Thank you,     Norma Roman, Pharm.D.

## 2020-08-08 NOTE — ASSESSMENT & PLAN NOTE
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 Destinee Noyola   - renal following  Monitor labs, improved creatinine  - continue ivf  "

## 2020-08-09 VITALS
BODY MASS INDEX: 32.61 KG/M2 | HEART RATE: 98 BPM | HEIGHT: 63 IN | RESPIRATION RATE: 18 BRPM | OXYGEN SATURATION: 96 % | WEIGHT: 184.06 LBS | DIASTOLIC BLOOD PRESSURE: 95 MMHG | TEMPERATURE: 97 F | SYSTOLIC BLOOD PRESSURE: 127 MMHG

## 2020-08-09 LAB
ANION GAP SERPL CALC-SCNC: 10 MMOL/L (ref 8–16)
BACTERIA UR CULT: ABNORMAL
BASOPHILS # BLD AUTO: 0.03 K/UL (ref 0–0.2)
BASOPHILS NFR BLD: 0.3 % (ref 0–1.9)
BUN SERPL-MCNC: 11 MG/DL (ref 8–23)
CALCIUM SERPL-MCNC: 8.3 MG/DL (ref 8.7–10.5)
CHLORIDE SERPL-SCNC: 114 MMOL/L (ref 95–110)
CO2 SERPL-SCNC: 17 MMOL/L (ref 23–29)
CREAT SERPL-MCNC: 1.1 MG/DL (ref 0.5–1.4)
DIFFERENTIAL METHOD: ABNORMAL
EOSINOPHIL # BLD AUTO: 0.2 K/UL (ref 0–0.5)
EOSINOPHIL NFR BLD: 2.6 % (ref 0–8)
ERYTHROCYTE [DISTWIDTH] IN BLOOD BY AUTOMATED COUNT: 20.8 % (ref 11.5–14.5)
EST. GFR  (AFRICAN AMERICAN): 58 ML/MIN/1.73 M^2
EST. GFR  (NON AFRICAN AMERICAN): 51 ML/MIN/1.73 M^2
GLUCOSE SERPL-MCNC: 88 MG/DL (ref 70–110)
HCT VFR BLD AUTO: 26.5 % (ref 37–48.5)
HGB BLD-MCNC: 8.5 G/DL (ref 12–16)
IMM GRANULOCYTES # BLD AUTO: 0.04 K/UL (ref 0–0.04)
IMM GRANULOCYTES NFR BLD AUTO: 0.5 % (ref 0–0.5)
INR PPP: 1 (ref 0.8–1.2)
LYMPHOCYTES # BLD AUTO: 1.4 K/UL (ref 1–4.8)
LYMPHOCYTES NFR BLD: 15.5 % (ref 18–48)
MAGNESIUM SERPL-MCNC: 1.7 MG/DL (ref 1.6–2.6)
MCH RBC QN AUTO: 27.4 PG (ref 27–31)
MCHC RBC AUTO-ENTMCNC: 32.1 G/DL (ref 32–36)
MCV RBC AUTO: 86 FL (ref 82–98)
MONOCYTES # BLD AUTO: 0.9 K/UL (ref 0.3–1)
MONOCYTES NFR BLD: 10.8 % (ref 4–15)
NEUTROPHILS # BLD AUTO: 6.1 K/UL (ref 1.8–7.7)
NEUTROPHILS NFR BLD: 70.3 % (ref 38–73)
NRBC BLD-RTO: 0 /100 WBC
PHOSPHATE SERPL-MCNC: 3.1 MG/DL (ref 2.7–4.5)
PLATELET # BLD AUTO: 169 K/UL (ref 150–350)
PMV BLD AUTO: 9.7 FL (ref 9.2–12.9)
POTASSIUM SERPL-SCNC: 3.6 MMOL/L (ref 3.5–5.1)
PROTHROMBIN TIME: 10.9 SEC (ref 9–12.5)
RBC # BLD AUTO: 3.1 M/UL (ref 4–5.4)
SODIUM SERPL-SCNC: 141 MMOL/L (ref 136–145)
WBC # BLD AUTO: 8.72 K/UL (ref 3.9–12.7)

## 2020-08-09 PROCEDURE — 25000003 PHARM REV CODE 250: Performed by: INTERNAL MEDICINE

## 2020-08-09 PROCEDURE — 63600175 PHARM REV CODE 636 W HCPCS: Performed by: INTERNAL MEDICINE

## 2020-08-09 PROCEDURE — 83735 ASSAY OF MAGNESIUM: CPT

## 2020-08-09 PROCEDURE — S0030 INJECTION, METRONIDAZOLE: HCPCS | Performed by: INTERNAL MEDICINE

## 2020-08-09 PROCEDURE — 99239 PR HOSPITAL DISCHARGE DAY,>30 MIN: ICD-10-PCS | Mod: ,,, | Performed by: INTERNAL MEDICINE

## 2020-08-09 PROCEDURE — 94640 AIRWAY INHALATION TREATMENT: CPT

## 2020-08-09 PROCEDURE — 85025 COMPLETE CBC W/AUTO DIFF WBC: CPT

## 2020-08-09 PROCEDURE — 94761 N-INVAS EAR/PLS OXIMETRY MLT: CPT

## 2020-08-09 PROCEDURE — 80048 BASIC METABOLIC PNL TOTAL CA: CPT

## 2020-08-09 PROCEDURE — 84100 ASSAY OF PHOSPHORUS: CPT

## 2020-08-09 PROCEDURE — 99239 HOSP IP/OBS DSCHRG MGMT >30: CPT | Mod: ,,, | Performed by: INTERNAL MEDICINE

## 2020-08-09 PROCEDURE — 85610 PROTHROMBIN TIME: CPT

## 2020-08-09 PROCEDURE — 25000242 PHARM REV CODE 250 ALT 637 W/ HCPCS: Performed by: INTERNAL MEDICINE

## 2020-08-09 PROCEDURE — 36415 COLL VENOUS BLD VENIPUNCTURE: CPT

## 2020-08-09 RX ORDER — BUMETANIDE 1 MG/1
1 TABLET ORAL DAILY
Refills: 3
Start: 2020-08-09 | End: 2021-04-06

## 2020-08-09 RX ORDER — SPIRONOLACTONE 25 MG/1
25 TABLET ORAL DAILY
Qty: 90 TABLET | Refills: 3 | Status: SHIPPED | OUTPATIENT
Start: 2020-08-09 | End: 2020-11-05 | Stop reason: SDUPTHER

## 2020-08-09 RX ORDER — CIPROFLOXACIN 500 MG/1
500 TABLET ORAL 2 TIMES DAILY
Qty: 14 TABLET | Refills: 0 | Status: SHIPPED | OUTPATIENT
Start: 2020-08-09 | End: 2020-10-22

## 2020-08-09 RX ORDER — METRONIDAZOLE 500 MG/1
500 TABLET ORAL 3 TIMES DAILY
Qty: 21 TABLET | Refills: 0 | Status: SHIPPED | OUTPATIENT
Start: 2020-08-09 | End: 2020-10-22

## 2020-08-09 RX ADMIN — DIPHENHYDRAMINE HYDROCHLORIDE 25 MG: 25 CAPSULE ORAL at 02:08

## 2020-08-09 RX ADMIN — PANTOPRAZOLE SODIUM 40 MG: 40 TABLET, DELAYED RELEASE ORAL at 08:08

## 2020-08-09 RX ADMIN — METRONIDAZOLE 500 MG: 500 INJECTION, SOLUTION INTRAVENOUS at 08:08

## 2020-08-09 RX ADMIN — IPRATROPIUM BROMIDE AND ALBUTEROL SULFATE 3 ML: .5; 3 SOLUTION RESPIRATORY (INHALATION) at 07:08

## 2020-08-09 RX ADMIN — ACETAMINOPHEN 650 MG: 325 TABLET ORAL at 02:08

## 2020-08-09 RX ADMIN — TIOTROPIUM BROMIDE 18 MCG: 18 CAPSULE ORAL; RESPIRATORY (INHALATION) at 08:08

## 2020-08-09 RX ADMIN — CIPROFLOXACIN 200 MG: 2 INJECTION, SOLUTION INTRAVENOUS at 02:08

## 2020-08-09 RX ADMIN — FLUTICASONE FUROATE AND VILANTEROL TRIFENATATE 1 PUFF: 100; 25 POWDER RESPIRATORY (INHALATION) at 08:08

## 2020-08-09 RX ADMIN — BUPROPION HYDROCHLORIDE 150 MG: 150 TABLET, FILM COATED, EXTENDED RELEASE ORAL at 08:08

## 2020-08-09 RX ADMIN — METRONIDAZOLE 500 MG: 500 INJECTION, SOLUTION INTRAVENOUS at 01:08

## 2020-08-09 NOTE — PT/OT/SLP EVAL
Physical Therapy Evaluation    Patient Name:  Caryn Rodriguez   MRN:  [id number]    Recommendations:     Discharge Recommendations:  home health PT, home health OT(intial 24/7 assistance from )   Discharge Equipment Recommendations: none   Barriers to discharge: None    Assessment:     Caryn Rodriguez is a 70 y.o. female admitted with a medical diagnosis of Acute GI bleeding.  She presents with the following impairments/functional limitations:  impaired endurance, impaired functional mobilty, gait instability, impaired balance, impaired skin.    Patient evaluated by PT and goals established. Due to the above impairments, the patient is limited in her ability to independently ambulate, transfer, and participate in her chosen activities.    Patient endorses generalized weakness and feeling cold, but reports is improving since admission. Performed bed mobility and sit<>stand with SBA and ambulated 2x12 ft with no AD with occasional furniture cruising and SBA-supervision. No overt LOB noted and pt denies dizziness. PT will continue to follow and progress as tolerated. Rec for d/c to home with  PT and OT with initial 24/7 assistance from .    Rehab Prognosis: Good; patient would benefit from acute skilled PT services to address these deficits and reach maximum level of function.    Recent Surgery: * No surgery found *      Plan:     During this hospitalization, patient to be seen 3 x/week to address the identified rehab impairments via gait training, therapeutic activities, therapeutic exercises and progress toward the following goals:    · Plan of Care Expires:  08/16/20    Subjective     Chief Complaint: Feeling cold when out of bed, pt attributes to anemia  Patient/Family Comments/goals: To get stronger and to go home today; Patient agreeable to evaluation.  Pain/Comfort:  · Pain Rating 1: 0/10  · Pain Rating Post-Intervention 1: 0/10    Patients cultural, spiritual, [employer] conflicts given the  current situation: no    Living Environment:  · Pt lives with her  in a single story home with no steps to enter.   · Pt has a tub shower with a plastic chair to sit on and a regular toilet.   · Upon discharge, patient will have assistance from her  as needed.  Prior level of function:  · Ambulation: Indep- mod(I) : started to use  RW a few days PTA due to dizziness/weakness  · ADL's: Indep-mod(I) - use of chair when needed while showering  · IADLs: Indep - cooks, cleans  · Falls: Denies  · Equipment used at home: shower chair, walker, rolling.  DME owned (not currently used): none.      Objective:     Communicated with TOMAS Slater prior to session.  Patient found HOB elevated with peripheral IV  upon PT entry to room.    General Precautions: Standard, fall   Orthopedic Precautions:N/A   Braces: N/A     Patient donned yellow socks and gait belt for OOB mobility.     Exams:  · Cognition:   · Patient is oriented x4.  · Pt follows approximately 100% of one and two step commands.    · Mood: Pleasant and cooperative.   · Safety Awareness: Good, use of RW at home when feeling weaker  · Musculoskeletal:  · BMI: 32.6  · Posture:  Forward head, rounded shoulders  · LE ROM/Strength:   · R ROM: WFL  · L ROM: WFL  · R Strength:   · Hip flexion: 4/5  · Knee extension: 5/5  · Dorsiflexion: 5/5   · L Strength:   · Hip flexion: 4/5  · Knee extension: 5/5  · Dorsiflexion: 5/5   · Neuromuscular:  · Sensation: Intact to light touch bilateral LEs.   · Tone/Reflexes: No impairments identified with functional mobility. No formal testing performed.   · Coordination:  · Heel to shin: Intact  · Balance:   · Static sitting: Normal  · Static standing: Good  · Dynamic standing: Good  · Visual-vestibular: No impairments identified with functional mobility. No formal testing performed.  · Integument: Bruising of B forearms   · Cardiopulmonary:  · O2 Requirements: RA  · Vital signs:   · Pre(sitting): HR 99 SpO2 97%  · Edema: No  pitting edema noted BLE      Functional Mobility:  · Bed Mobility:     · Supine to Sit: stand by assistance  · Transfers:     · Sit to Stand:  stand by assistance with no AD  · Gait: 2x12 ft with SBA-sueprvision with no AD and occasional furniture crusing. No gross gait devaiations of LOB noted, pt denies dizziness or weakness.     Therapeutic Activities and Exercises:   PT educated patient re:   PT plan of care/role of PT  Safety with OOB mobility  Use of RW when needed  Discharge disposition    Pt verbalized understanding   · White board updated with mobility recs    AM-PAC 6 CLICK MOBILITY  Total Score:20     Patient left up in chair with all lines intact, call button in reach and RN Bryon notified.    GOALS:   Multidisciplinary Problems     Physical Therapy Goals        Problem: Physical Therapy Goal    Goal Priority Disciplines Outcome Goal Variances Interventions   Physical Therapy Goal     PT, PT/OT Ongoing, Progressing     Description: Goals to be met by: 20    Patient will increase functional independence with mobility by performin. Supine<>sit with independence without use of hospital bed features..  2. Gait x 150 feet with supervision with LRAD.  3. Sit<>stand from low chair with mod(I) with LRAD.                    History:     Past Medical History:   Diagnosis Date    CKD (chronic kidney disease), stage III     COPD (chronic obstructive pulmonary disease)     Coronary artery disease     Disorder of kidney and ureter     GERD (gastroesophageal reflux disease)     Hypertension     PONV (postoperative nausea and vomiting)        Past Surgical History:   Procedure Laterality Date    Double Bypass      HYSTERECTOMY      INJECTION OF ANESTHETIC AGENT AROUND MEDIAL BRANCH NERVES INNERVATING LUMBAR FACET JOINT Right 3/13/2020    Procedure: BLOCK, NERVE, FACET JOINT, LUMBAR, MEDIAL BRANCH;  Surgeon: Kam Baker MD;  [facility];  Service: Pain Management;  Laterality: Right;   L2,3,4,5    INSERTION OF IMPLANTABLE LOOP RECORDER N/A 3/11/2019    Procedure: Insertion, Implantable Loop Recorder;  Surgeon: Art Marquez MD;  [facility];  Service: Cardiology;  Laterality: N/A;    INSERTION OF MULTIFOCAL INTRAOCULAR LENS Right 11/20/2019    Procedure: INSERTION, IOL, MULTIFOCAL;  Surgeon: Larry Leal MD;  [facility];  Service: Ophthalmology;  Laterality: Right;    PHACOEMULSIFICATION OF CATARACT Right 11/20/2019    Procedure: PHACOEMULSIFICATION, CATARACT;  Surgeon: Larry Leal MD;  [facility];  Service: Ophthalmology;  Laterality: Right;    TRANSFORAMINAL EPIDURAL INJECTION OF STEROID Right 1/31/2020    Procedure: INJECTION, STEROID, EPIDURAL, TRANSFORAMINAL APPROACH;  Surgeon: Kam Baker MD;  [facility];  Service: Pain Management;  Laterality: Right;  L4, L5       Time Tracking:     PT Received On: 08/09/20  PT Start Time: 0830     PT Stop Time: 0844  PT Total Time (min): 14 min     Billable Minutes: Evaluation 14      Halima Callahan, PT  08/09/2020

## 2020-08-09 NOTE — PLAN OF CARE
Pt states she lives independently at home. Agreeable to new Home health.   Referrals sent to [facility] and [facility] , will followup during regular business hours to see who can accept this patient.     Family to provide transportation home.    No further DC needs from  perspective.         08/09/20 1158   Final Note   Assessment Type Final Discharge Note   Anticipated Discharge Disposition Home-Health   What phone number can be called within the next 1-3 days to see how you are doing after discharge? [phone]   Hospital Follow Up  Appt(s) scheduled? Yes   Discharge plans and expectations educations in teach back method with documentation complete? Yes   Right Care Referral Info   Post Acute Recommendation Home-care

## 2020-08-09 NOTE — PT/OT/SLP PROGRESS
Occupational Therapy      Patient Name:  Caryn Rodriguez   MRN:  [id number]    Patient not seen today secondary to Unavailable (Comment)(Patient with hospital discharge prior to initiation of OT evaluation).     ZOIE Caruso  8/9/2020

## 2020-08-09 NOTE — PLAN OF CARE
[facility]    HOME HEALTH ORDERS  FACE TO FACE ENCOUNTER    Patient Name: Caryn Rodriguez  YOB: 1950    PCP: Sterling Wellington MD   PCP Address: [address] [place] [address] [facility] *  PCP Phone Number: [phone]  PCP Fax: [phone]    Encounter Date: 08/09/2020    Admit to Home Health    Diagnoses:  Active Hospital Problems    Diagnosis  POA    *Acute GI bleeding [K92.2]  Yes    Acute blood loss anemia [D62]  Yes    Abdominal pain [R10.9]  Yes    Lactic acidosis [E87.2]  Yes    Supratherapeutic INR [R79.1]  Yes    Acute renal failure superimposed on stage 3 chronic kidney disease [N17.9, N18.3]  Yes    Chronic diastolic heart failure [I50.32]  Yes    Abnormal urinalysis [R82.90]  Yes    COPD (chronic obstructive pulmonary disease) [J44.9]  Yes    Carotid artery disease [I73.9]  Yes    Essential hypertension [I10]  Yes    HLD (hyperlipidemia) [E78.5]  Yes      Resolved Hospital Problems   No resolved problems to display.       Future Appointments   Date Time Provider Department Center   11/5/2020  1:15 PM Vale Noyola MD [facility]     Follow-up Information     Follow up In 1 week.    Contact information:  cardiology as scheduled ion 08/24                   I have seen and examined this patient face to face today. My clinical findings that support the need for the home health skilled services and home bound status are the following:  Weakness/numbness causing balance and gait disturbance due to Anemia making it taxing to leave home.    Allergies:  Review of patient's allergies indicates:   Allergen Reactions    Codeine     Iodine and iodide containing products      Patient broke out in hives after last receiving Iodine    Metolazone      Causes dizziness, palpitations, nausea    Omnipaque [iohexol]      Pt states she had rash after returning home from last  Steroid injection, though no rash noted before discharge from PACU     Penicillins     Potassium     Sulfa (sulfonamide antibiotics)     Sulfone        Diet: regular diet    Activities: activity as tolerated    Nursing:   SN to complete comprehensive assessment including routine vital signs. Instruct on disease process and s/s of complications to report to MD. Review/verify medication list sent home with the patient at time of discharge  and instruct patient/caregiver as needed. Frequency may be adjusted depending on start of care date.    Notify MD if SBP > 160 or < 90; DBP > 90 or < 50; HR > 120 or < 50; Temp > 101      CONSULTS:    Physical Therapy to evaluate and treat. Evaluate for home safety and equipment needs; Establish/upgrade home exercise program. Perform / instruct on therapeutic exercises, gait training, transfer training, and Range of Motion.  Occupational Therapy to evaluate and treat. Evaluate home environment for safety and equipment needs. Perform/Instruct on transfers, ADL training, ROM, and therapeutic exercises.   to evaluate for community resources/long-range planning.  Aide to provide assistance with personal care, ADLs, and vital signs.          Medications: Review discharge medications with patient and family and provide education.      Discharge Medication List as of 8/9/2020 10:05 AM      CONTINUE these medications which have CHANGED    Details   bumetanide (BUMEX) 1 MG tablet Take 1 tablet (1 mg total) by mouth once daily. As needed for fluid, Starting Sun 8/9/2020, No Print      spironolactone (ALDACTONE) 25 MG tablet Take 1 tablet (25 mg total) by mouth once daily., Starting Sun 8/9/2020, Normal, if bp ok and has fluid         CONTINUE these medications which have NOT CHANGED    Details   albuterol (ACCUNEB) 1.25 mg/3 mL Nebu Inhale 1 ampule into the lungs. 4 times PRN, Historical Med      albuterol (PROVENTIL/VENTOLIN HFA) 90 mcg/actuation inhaler Inhale 2 puffs into the lungs every 6 (six) hours as needed for Wheezing. Rescue, Historical  Med      allopurinol (ZYLOPRIM) 100 MG tablet TAKE 1 TABLET BY MOUTH ONE TIME DAILY, Normal      alprazolam (XANAX) 0.25 MG tablet Take 0.25 mg by mouth daily as needed for Anxiety., Until Discontinued, Historical Med      aspirin 81 MG Chew Take 81 mg by mouth once daily., Until Discontinued, Historical Med   Istart in couple of days if no further bleeding   buPROPion (WELLBUTRIN XL) 150 MG TB24 tablet 150 mg once daily. , Starting Tue 4/11/2017, Historical Med      carvedilol (COREG) 12.5 MG tablet Take 12.5 mg by mouth 2 (two) times daily., Historical Med      xarelto 20 mg daily , start in couple of days if no further bleeding       fluticasone-umeclidin-vilanter (TRELEGY ELLIPTA) 100-62.5-25 mcg DsDv Inhale 1 puff into the lungs once daily., Historical Med      gabapentin (NEURONTIN) 300 MG capsule Take 300 mg by mouth 2 (two) times daily., Historical Med      magnesium 250 mg Tab Take 250 mg by mouth once daily., Until Discontinued, Historical Med      montelukast (SINGULAIR) 10 mg tablet 10 mg nightly., Starting Mon 8/12/2019, Historical Med      omeprazole (PRILOSEC) 40 MG capsule Take 1 capsule (40 mg total) by mouth once daily., Starting Thu 1/16/2020, Until Mon 6/29/2020, Normal      traMADoL (ULTRAM) 50 mg tablet Take 50 mg by mouth daily as needed., Starting Fri 6/19/2020, Historical Med      Ciprofloxacin 500 mg oral bid for 7 days    Flagyl 500 mg oral tid for 7 days       I certify that this patient is confined to her home and needs intermittent skilled nursing care, physical therapy and occupational therapy.        Ann Malone MD  08/09/2020

## 2020-08-09 NOTE — PROGRESS NOTES
Progress Note  Nephrology    Admit Date: 8/6/2020   LOS: 3 days     SUBJECTIVE:     Follow-up For: ANDRAE on CKD Stage 3    Interval History: Stepped down to the floor.  No more bleeding from stools.  Son at the bedside.  Appetite improving.    OBJECTIVE:     Vital Signs (Most Recent)  Temp: 97.2 °F (36.2 °C) (08/09/20 0735)  Pulse: 98 (08/09/20 0846)  Resp: 18 (08/09/20 0846)  BP: (!) 127/95 (08/09/20 0735)  SpO2: 96 % (08/09/20 0753)    Vital Signs Range (Last 24H):  Temp:  [97.2 °F (36.2 °C)-98.9 °F (37.2 °C)]   Pulse:  []   Resp:  [16-31]   BP: (115-149)/(56-95)   SpO2:  [93 %-99 %]     Review of Systems:   Constitutional: no fever or chills   Respiratory: no cough or shortness of breath   Cardiovascular: no chest pain or palpitations   Gastrointestinal: no nausea or vomiting, no abdominal pain. + Black and bloody stools.  Genitourinary: no hematuria or dysuria   Musculoskeletal: no arthralgias or myalgias   Neurological: no seizures or tremors    Physical Exam:  Gen: AAOx3, NAD, frail  HEENT: mmm, sclera anicteric  CV: RRR, no m/r  Resp: CTAB, no rales or wheezes  GI: soft, obese, NTTP  Skin: warm, dry  Extr: no edema    Laboratory:  Recent Labs   Lab 08/07/20  0234 08/08/20  0540 08/09/20  0502    140 141   K 4.9 3.9 3.6    114* 114*   CO2 19* 18* 17*   BUN 39* 20 11   CREATININE 1.8* 1.2 1.1   CALCIUM 8.2* 7.8* 8.3*   PHOS  --  2.5* 3.1     Recent Labs   Lab 08/08/20  0540 08/08/20  1448 08/08/20  1839 08/08/20  2335 08/09/20  0502   WBC 9.46 10.92  --   --  8.72   HGB 6.8*  6.7* 8.6* 8.9* 8.2* 8.5*   HCT 21.8*  21.5* 27.0* 28.0* 26.2* 26.5*    191  --   --  169        Diagnostic Results:  Labs: Reviewed  X-Ray: Reviewed    ASSESSMENT/PLAN:     1. ANDRAE on CKD III (baseline 1.5) secondary to ABLA/hypotension from GIB: Cr trending down with IVFs and PRBCs.  Now normotensive.   Extensive hx of renal disease and followed by Dr. Nyoola.  Only 1 functional malformed right kidney and left  kidney atrophy.  Aggressive w/up with negative serologic findings except mild COLLEEN+ but negative reflex.  Also hx of stone and encouraged adequate hydration at home. Off NSAIDs now.  Hold ACE/ARB for now.  Renally dose meds, avoid nephrotoxins, and monitor I/O's closely.  Off IVFs.  2. Lower GI Bleed from OAC:  Resolving.  No plans for intervention per GI.  If re-bleeds needs to have angiography by IR.  H/H stable after at least 3u PRBCs.  3. Mild NAGMA: Due to bicarb-free fluids.  IVFs just stopped.  4. Mesenteric ischemia with recent stenting:  Reviewed U/S which is unremarkable.  Gen Surg following.    Dispo: Stable for discharge.  Please call with questions prn.  D/W Dr. Malone.    Thank you for allowing me to participate in the care of this patient.      Quinten Strange MD  Nephrology

## 2020-08-09 NOTE — DISCHARGE SUMMARY
[facility] Hospital Medicine  Discharge Summary      Patient Name: Caryn Rodriguez  MRN: [id number]  Admission Date: 8/6/2020  Hospital Length of Stay: 3 days  Discharge Date and Time: 8/9/2020 10:41 AM  Attending Physician: Crystal att. providers found   Discharging Provider: Ann Malone MD  Primary Care Provider: Sterling Wellington MD      HPI:   Ms. Caryn Rodriguez is a 70 y.o. female, with PMH of CAD, Mesenteric ischemia (s/p iliac vein stenting 3 weeks ago, on Eliquis), COPD, HTN, GERD, who presented to [facility] on 8/6/20 after a bloody BM that morning. She described the BM as bright red blood in diarrhea, which occurred multiple times. This is associated with diffuse abdominal pain. She states that she first started with abdominal pain when she started taking Eliquis 3 weeks ago. It has been associated with nausea and diarrhea. She states she had an angiogram on 7/17/20 and had stents placed. Initially she continued on the Eliquis, but the abdominal pain has become to much to take it, and she was changed to Xarelto two days aog. Her last dose of Eliquis was 8/5/20 when she started taking Xarelto, she has taken two doses of Xarelto since beginning this medication. Initial HGB in ED was 9.9, and upon repeat was 9.7. After another bloody BM in the ED, PRBC transfusion was initiated. Lactic Acid francisca from 2.6 to 4.4 while in the ED. Blood cultures pending, CXR and UA ordered upon admission. She was admitted to inpatient status to the ICU.     * No surgery found *      Hospital Course:   Initial wbc was 44.94 .Patient was started on broad spectrum abx with cipro, flagyl, vanc, aztreonam.Aztreonam was dcd.Repeat lactic acid better.Wbc improving and back to normal.Blood cx negative, vancomycin dcd.Abdominal us for mesentric ischemia was limited but did not show evidence for hemodynamically significant stenosis of the celiac artery or superior mesenteric artery based upon peak systolic velocity  criteria. did not show Luis improved with ivf.Was likely diverticular bleeding, she received total 3 units of blood with improved and stable h/h.She also received a dose of k centra with active bleeding.she was not having any bright red blood but dark tarry stool .she was feeling a lot better, abdominal pain improved, tolerating diet and discharged home on course of cipro and flagyl.Cdiff was negative.She was told to start back her asa and xarelto in 2-3 days if she does not have any further bleeding.Will follow with cards in 2 weeks.Was told to start on coreg initially half bid and monitor bp and increase accordingly to 1 pill bid..Also to start back bumex and spironolactone 1 at a time for swelling.Home health was arranged on dc.Ua was abnormal and urine cx growing gram negative rods [id number],  but patient not symptomatic.     Consults:   Consults (From admission, onward)        Status Ordering Provider     Inpatient consult to Gastroenterology  Once     Provider:  Ever Tejeda MD    Completed FABIO HEREDIA     Inpatient consult to General Surgery  Once     Provider:  (Not yet assigned)    DMITRIY Junior     Inpatient consult to Nephrology-[facility]  Once     Provider:  Dustin Olivares NP    Completed FABIO HEREDIA     Inpatient consult to Pulmonary Critical Care  Once     Provider:  Braulio Rosas MD    Completed FABIO HEREDIA          No new Assessment & Plan notes have been filed under this hospital service since the last note was generated.  Service: Hospital Medicine    Final Active Diagnoses:    Diagnosis Date Noted POA    PRINCIPAL PROBLEM:  Acute GI bleeding [K92.2] 08/06/2020 Yes    Acute blood loss anemia [D62] 08/07/2020 Yes    Abdominal pain [R10.9] 08/07/2020 Yes    Lactic acidosis [E87.2] 08/06/2020 Yes    Supratherapeutic INR [R79.1] 08/06/2020 Yes    Acute renal failure superimposed on stage 3 chronic kidney disease [N17.9, N18.3] 08/06/2020 Yes     Chronic diastolic heart failure [I50.32] 01/07/2020 Yes    Abnormal urinalysis [R82.90] 09/01/2017 Yes    COPD (chronic obstructive pulmonary disease) [J44.9] 05/01/2017 Yes    Carotid artery disease [I73.9] 05/01/2017 Yes    Essential hypertension [I10] 05/01/2017 Yes    HLD (hyperlipidemia) [E78.5] 05/01/2017 Yes      Problems Resolved During this Admission:       Discharged Condition: stable    Disposition: Home-Health Care Svc    Follow Up:  Follow-up Information     Follow up In 1 week.    Contact information:  cardiology as scheduled ion 08/24               Patient Instructions:      Diet Adult Regular     Activity as tolerated       Significant Diagnostic Studies:  Lab Results   Component Value Date    WBC 8.72 08/09/2020    HGB 8.5 (L) 08/09/2020    HCT 26.5 (L) 08/09/2020    MCV 86 08/09/2020     08/09/2020       BMP  Lab Results   Component Value Date     08/09/2020    K 3.6 08/09/2020     (H) 08/09/2020    CO2 17 (L) 08/09/2020    BUN 11 08/09/2020    CREATININE 1.1 08/09/2020    CALCIUM 8.3 (L) 08/09/2020    ANIONGAP 10 08/09/2020    ESTGFRAFRICA 58 (A) 08/09/2020    EGFRNONAA 51 (A) 08/09/2020     US Mesenteric Ischemia Study (xpd)  Narrative: EXAMINATION:  US MESENTERIC ISCHEMIA STUDY (XPD)    CLINICAL HISTORY:  possible mesenteric ischemia;    TECHNIQUE:  Ultrasound was performed via mesenteric ischemic study with targeted evaluation of the mesenteric arterial vasculature of the abdomen.    COMPARISON:  None    FINDINGS:  The technologist indicates the examination was technically difficult, and limited.  Clinical correlation is otherwise needed to determine need for additional follow-up imaging.  There is no sonographic evidence for aneurysmal dilatation of the aorta on submitted imaging, with maximal measurement of 2.4 cm transverse and 2.6 cm anterior to posterior dimension.  Arterial flow is seen within the aorta, waveforms appearing somewhat irregular thought likely due to  limitations of the examination, the peak systolic velocities measured within the aorta are somewhat low, measuring up to approximately 54 centimeters/second however this may relate to limitations of the exam.  The celiac artery demonstrates a maximal peak systolic velocity of 159 centimeters/second, the superior mesenteric artery demonstrates a maximum peak systolic velocity of 79.1 centimeters/second, these velocities are not elevated and there is no sonographically detectable hemodynamically significant stenosis of the celiac artery or superior mesenteric artery based upon peak systolic velocity criteria.  Impression: The examination is limited as discussed above however there is no sonographic evidence for hemodynamically significant stenosis of the celiac artery or superior mesenteric artery based upon peak systolic velocity criteria.    Electronically signed by: Chang Reid  Date:    08/07/2020  Time:    02:24      Pending Diagnostic Studies:     None         Medications:  Reconciled Home Medications:      Medication List      START taking these medications    ciprofloxacin HCl 500 MG tablet  Commonly known as: CIPRO  Take 1 tablet (500 mg total) by mouth 2 (two) times daily.     metroNIDAZOLE 500 MG tablet  Commonly known as: FLAGYL  Take 1 tablet (500 mg total) by mouth 3 (three) times daily.     rivaroxaban 10 mg Tab  Commonly known as: XARELTO  Take 2 tablets (20 mg total) by mouth daily with dinner or evening meal.        CHANGE how you take these medications    bumetanide 1 MG tablet  Commonly known as: BUMEX  Take 1 tablet (1 mg total) by mouth once daily. As needed for fluid  What changed:   · when to take this  · additional instructions        CONTINUE taking these medications    * albuterol 1.25 mg/3 mL Nebu  Commonly known as: ACCUNEB  Inhale 1 ampule into the lungs. 4 times PRN     * albuterol 90 mcg/actuation inhaler  Commonly known as: PROVENTIL/VENTOLIN HFA  Inhale 2 puffs into the lungs every  6 (six) hours as needed for Wheezing. Rescue     allopurinoL 100 MG tablet  Commonly known as: ZYLOPRIM  TAKE 1 TABLET BY MOUTH ONE TIME DAILY     ALPRAZolam 0.25 MG tablet  Commonly known as: XANAX  Take 0.25 mg by mouth daily as needed for Anxiety.     aspirin 81 MG Chew  Take 81 mg by mouth once daily.     buPROPion 150 MG TB24 tablet  Commonly known as: WELLBUTRIN XL  150 mg once daily.     carvediloL 12.5 MG tablet  Commonly known as: COREG  Take 12.5 mg by mouth 2 (two) times daily.     gabapentin 300 MG capsule  Commonly known as: NEURONTIN  Take 300 mg by mouth 2 (two) times daily.     magnesium 250 mg Tab  Take 250 mg by mouth once daily.     montelukast 10 mg tablet  Commonly known as: SINGULAIR  10 mg nightly.     omeprazole 40 MG capsule  Commonly known as: PRILOSEC  Take 1 capsule (40 mg total) by mouth once daily.     spironolactone 25 MG tablet  Commonly known as: ALDACTONE  Take 1 tablet (25 mg total) by mouth once daily.     traMADoL 50 mg tablet  Commonly known as: ULTRAM  Take 50 mg by mouth daily as needed.     TRELEGY ELLIPTA 100-62.5-25 mcg Dsdv  Generic drug: fluticasone-umeclidin-vilanter  Inhale 1 puff into the lungs once daily.        STOP taking these medications    ELIQUIS 5 mg Tab  Generic drug: apixaban            Indwelling Lines/Drains at time of discharge:   Lines/Drains/Airways     None                 Time spent on the discharge of patient: 40 minutes  Patient was seen and examined on the date of discharge and determined to be suitable for discharge.         Ann Malone MD  Department of Hospital Medicine  [facility]

## 2020-08-09 NOTE — PLAN OF CARE
AAOx4, VSS, RA. IV 20G Left forearm infusing. POC and goals reviewed with pt. No falls or injuries on shift. Purposeful hourly rounding done. Tele on. No complaints of pain or discomfort at this time. Only complaint is not being able to sleep. Ambulate to bathroom w/ standby x 1. SOB noted when getting back into bed. PRN meds given for sleep, HA, and itching. Personal items at BS. Bed low and locked, call light in reach, no needs at this time, will continue to monitor.

## 2020-08-09 NOTE — PLAN OF CARE
Problem: Physical Therapy Goal  Goal: Physical Therapy Goal  Description: Goals to be met by: 20    Patient will increase functional independence with mobility by performin. Supine<>sit with independence without use of hospital bed features..  2. Gait x 150 feet with supervision with LRAD.  3. Sit<>stand from low chair with mod(I) with LRAD.   Outcome: Ongoing, Progressing     Patient evaluated by PT and goals established. Patient endorses generalized weakness and feeling cold, but reports is improving since admission. Performed bed mobility and sit<>stand with SBA and ambulated 2x12 ft with no AD with occasional furniture cruising and SBA-supervision. No overt LOB noted and pt denies dizziness. PT will continue to follow and progress as tolerated. Rec for d/c to home with HH PT and OT with initial  assistance from . Please see progress note for detailed plan of care and recommendations.

## 2020-08-10 ENCOUNTER — PATIENT OUTREACH (OUTPATIENT)
Dept: ADMINISTRATIVE | Facility: CLINIC | Age: 70
End: 2020-08-10

## 2020-08-10 NOTE — PATIENT INSTRUCTIONS
When You Have Gastrointestinal (GI) Bleeding    Blood in your vomit or stool can be a sign of gastrointestinal (GI) bleeding. GI bleeding can be scary. But the cause may not be serious. You should always see a doctor if GI bleeding occurs.  The GI tract  The GI tract is the path through which food travels in the body. Food passes from the mouth down the esophagus (the tube from the mouth to the stomach). Food begins to break down in the stomach. It then moves through the duodenum, the first part of the small intestine. Nutrients are absorbed as food travels through the small intestine. What is left passes into the colon (large intestine) as waste. The colon removes water from the waste. Waste continues from the colon to the rectum (where stool is stored). Waste then leaves the body through the anus.  Causes of GI bleeding  GI bleeding can be caused by many different problems. Some of the more common causes include:  · Swollen veins in the anus (hemorrhoids)  · Swollen veins in the esophagus (varices)  · Sore on the lining of the GI tract (ulcer)  · Cuts or scrapes in the mouth or throat  · Infection caused by germs such as bacteria or parasites  · Food allergies, such as milk allergy in young children  · Medicines  · Inflammation of the GI tract (gastritis or esophagitis)  · Colitis (Crohn's disease or ulcerative colitis)  · Cancer (tumors or polyps)  · Abnormal pouches in the colon (diverticula)  · Tears in the esophagus or anus  · Nosebleed  · Abnormal blood vessels in the GI tract (angiodysplasia)  Diagnosing the cause of blood in stool  If blood is coming out in your stool, you may have a lower GI tract problem or a very fast upper GI tract bleed. Bleeding from the GI tract can be bright red. Or it may look dark and tarry. Tests may also find blood in your stool that cant be seen with the eye (occult blood). To find out the cause, tests that may be ordered include:  · Blood tests. A blood sample is taken and  sent to a lab for exam.  · Hemoccult test. Checks a stool sample for blood.  · Stool culture. Checks a stool sample for bacteria or parasites.  · X-ray, ultrasound, or CT scan. Imaging tests that take pictures of the digestive tract.  · Colonoscopy or sigmoidoscopy. This test uses a flexible tube with a tiny camera. The tube is inserted through your anus into your rectum to see the inside of your colon. Your provider can also take a tiny tissue sample (biopsy) and treat a bleeding source  Diagnosing the cause of blood in vomit  If you are vomiting blood or something that looks like coffee grounds, you may have an upper GI tract problem. To find the cause, tests that may be done include:  · Upper Endoscopy. A flexible tube with a tiny camera is inserted through your mouth and throat to see inside your upper GI tract. This lets your provider take a tiny tissue sample (biopsy) and treat a bleeding source.  · Nasogastric lavage. This can tell if you have upper GI or lower GI bleeding.  · X-ray, ultrasound, or CT scan. Imaging tests that take pictures of your digestive tract.  · Upper GI series. X-rays of the upper part of your GI tract taken from inside your body.  · Enteroscopy. This sends a flexible tube or a small, swallowed capsule camera into your small intestine.  When to call your healthcare provider  Call your healthcare provider right away if you have any of the following:  · Bleeding from your mouth or anus that can't be stopped  · Fever of 100.4°F (38.0°) or higher  · Bleeding along with feeling lightheaded or dizzy  · Signs of fluid loss (dehydration). These include a dry, sticky mouth, decreased urine output; and very dark urine.  · Belly (abdominal) pain     © 0300-0614 [employer]. [address] [place] [address], [place] [address]. All rights reserved. This information is not intended as a substitute for professional medical care. Always follow your healthcare professional's instructions.

## 2020-08-10 NOTE — PLAN OF CARE
Spoke with Rhina at [facility], they will begin seeing pt tomorrow.      Referral completed in [facility] and information added to AVS.     No further DC needs from CM perspective.

## 2020-08-11 PROCEDURE — G0180 MD CERTIFICATION HHA PATIENT: HCPCS | Mod: ,,, | Performed by: INTERNAL MEDICINE

## 2020-08-11 PROCEDURE — G0180 PR HOME HEALTH MD CERTIFICATION: ICD-10-PCS | Mod: ,,, | Performed by: INTERNAL MEDICINE

## 2020-08-12 LAB
BACTERIA BLD CULT: NORMAL
BACTERIA BLD CULT: NORMAL

## 2020-08-18 ENCOUNTER — DOCUMENT SCAN (OUTPATIENT)
Dept: HOME HEALTH SERVICES | Facility: HOSPITAL | Age: 70
End: 2020-08-18
Payer: COMMERCIAL

## 2020-08-24 ENCOUNTER — EXTERNAL HOME HEALTH (OUTPATIENT)
Dept: HOME HEALTH SERVICES | Facility: HOSPITAL | Age: 70
End: 2020-08-24
Payer: COMMERCIAL

## 2020-09-04 ENCOUNTER — DOCUMENT SCAN (OUTPATIENT)
Dept: HOME HEALTH SERVICES | Facility: HOSPITAL | Age: 70
End: 2020-09-04
Payer: COMMERCIAL

## 2020-10-20 ENCOUNTER — LAB VISIT (OUTPATIENT)
Dept: FAMILY MEDICINE | Facility: CLINIC | Age: 70
End: 2020-10-20
Attending: FAMILY MEDICINE
Payer: COMMERCIAL

## 2020-10-20 DIAGNOSIS — Z01.818 PREOPERATIVE TESTING: ICD-10-CM

## 2020-10-20 PROCEDURE — U0003 INFECTIOUS AGENT DETECTION BY NUCLEIC ACID (DNA OR RNA); SEVERE ACUTE RESPIRATORY SYNDROME CORONAVIRUS 2 (SARS-COV-2) (CORONAVIRUS DISEASE [COVID-19]), AMPLIFIED PROBE TECHNIQUE, MAKING USE OF HIGH THROUGHPUT TECHNOLOGIES AS DESCRIBED BY CMS-2020-01-R: HCPCS

## 2020-10-21 LAB — SARS-COV-2 RNA RESP QL NAA+PROBE: NOT DETECTED

## 2020-10-23 PROBLEM — M54.16 LEFT LUMBAR RADICULITIS: Status: ACTIVE | Noted: 2020-10-23

## 2020-11-23 ENCOUNTER — DOCUMENT SCAN (OUTPATIENT)
Dept: HOME HEALTH SERVICES | Facility: HOSPITAL | Age: 70
End: 2020-11-23

## 2020-11-26 ENCOUNTER — NURSE TRIAGE (OUTPATIENT)
Dept: ADMINISTRATIVE | Facility: CLINIC | Age: 70
End: 2020-11-26

## 2020-11-26 NOTE — TELEPHONE ENCOUNTER
Spoke with patient she states she is scheduled to do a SI injection on tomorrow with Dr. Baker.  She states she was supposed to get instruction on what time she was supposed to be there and diet.  She states she did not receive a call and is not sure what she needs to do.   Per  there is no one on call for [facility] pain management.  Was transferred to [facility] [place]-[phone]. Spoke with Maddie (house supervisor) whom states she will check surgery schedule and call me back.      Reason for Disposition   [1] Caller requesting NON-URGENT health information AND [2] PCP's office is the best resource    Protocols used: INFORMATION ONLY CALL-A-AH

## 2020-11-26 NOTE — TELEPHONE ENCOUNTER
Spoke with Maddie she states she spoke with Dr. Baker and he states he will call patient directly. Informed patient that she will receive a call from Dr Baker.  Patient does not need any further assistance at this time.

## 2020-12-01 ENCOUNTER — LAB VISIT (OUTPATIENT)
Dept: FAMILY MEDICINE | Facility: CLINIC | Age: 70
End: 2020-12-01
Payer: COMMERCIAL

## 2020-12-01 DIAGNOSIS — Z01.818 PREOPERATIVE TESTING: ICD-10-CM

## 2020-12-01 PROCEDURE — U0003 INFECTIOUS AGENT DETECTION BY NUCLEIC ACID (DNA OR RNA); SEVERE ACUTE RESPIRATORY SYNDROME CORONAVIRUS 2 (SARS-COV-2) (CORONAVIRUS DISEASE [COVID-19]), AMPLIFIED PROBE TECHNIQUE, MAKING USE OF HIGH THROUGHPUT TECHNOLOGIES AS DESCRIBED BY CMS-2020-01-R: HCPCS

## 2020-12-02 LAB — SARS-COV-2 RNA RESP QL NAA+PROBE: NOT DETECTED

## 2020-12-07 ENCOUNTER — OFFICE VISIT (OUTPATIENT)
Dept: GASTROENTEROLOGY | Facility: CLINIC | Age: 70
End: 2020-12-07
Payer: COMMERCIAL

## 2020-12-07 VITALS
HEART RATE: 68 BPM | DIASTOLIC BLOOD PRESSURE: 88 MMHG | RESPIRATION RATE: 16 BRPM | WEIGHT: 182.19 LBS | TEMPERATURE: 98 F | OXYGEN SATURATION: 98 % | HEIGHT: 63 IN | BODY MASS INDEX: 32.28 KG/M2 | SYSTOLIC BLOOD PRESSURE: 132 MMHG

## 2020-12-07 DIAGNOSIS — R10.32 LEFT LOWER QUADRANT ABDOMINAL PAIN: Primary | ICD-10-CM

## 2020-12-07 DIAGNOSIS — K62.5 RECTAL BLEEDING: ICD-10-CM

## 2020-12-07 DIAGNOSIS — N18.32 STAGE 3B CHRONIC KIDNEY DISEASE: ICD-10-CM

## 2020-12-07 DIAGNOSIS — Z12.11 SCREEN FOR COLON CANCER: ICD-10-CM

## 2020-12-07 DIAGNOSIS — Z01.818 PREOP EXAMINATION: ICD-10-CM

## 2020-12-07 PROBLEM — R79.1 SUPRATHERAPEUTIC INR: Status: RESOLVED | Noted: 2020-08-06 | Resolved: 2020-12-07

## 2020-12-07 PROBLEM — K92.2 ACUTE GI BLEEDING: Status: RESOLVED | Noted: 2020-08-06 | Resolved: 2020-12-07

## 2020-12-07 PROBLEM — N18.30 ACUTE RENAL FAILURE SUPERIMPOSED ON STAGE 3 CHRONIC KIDNEY DISEASE: Status: RESOLVED | Noted: 2020-08-06 | Resolved: 2020-12-07

## 2020-12-07 PROBLEM — N17.9 AKI (ACUTE KIDNEY INJURY): Status: RESOLVED | Noted: 2017-09-01 | Resolved: 2020-12-07

## 2020-12-07 PROBLEM — N17.9 ACUTE RENAL FAILURE SUPERIMPOSED ON STAGE 3 CHRONIC KIDNEY DISEASE: Status: RESOLVED | Noted: 2020-08-06 | Resolved: 2020-12-07

## 2020-12-07 PROBLEM — D62 ACUTE BLOOD LOSS ANEMIA: Status: RESOLVED | Noted: 2020-08-07 | Resolved: 2020-12-07

## 2020-12-07 PROCEDURE — 99999 PR PBB SHADOW E&M-EST. PATIENT-LVL V: CPT | Mod: PBBFAC,,, | Performed by: INTERNAL MEDICINE

## 2020-12-07 PROCEDURE — 99999 PR PBB SHADOW E&M-EST. PATIENT-LVL V: ICD-10-PCS | Mod: PBBFAC,,, | Performed by: INTERNAL MEDICINE

## 2020-12-07 PROCEDURE — 99204 OFFICE O/P NEW MOD 45 MIN: CPT | Mod: S$GLB,,, | Performed by: INTERNAL MEDICINE

## 2020-12-07 PROCEDURE — 99204 PR OFFICE/OUTPT VISIT, NEW, LEVL IV, 45-59 MIN: ICD-10-PCS | Mod: S$GLB,,, | Performed by: INTERNAL MEDICINE

## 2020-12-07 RX ORDER — POLYETHYLENE GLYCOL 3350, SODIUM SULFATE ANHYDROUS, SODIUM BICARBONATE, SODIUM CHLORIDE, POTASSIUM CHLORIDE 236; 22.74; 6.74; 5.86; 2.97 G/4L; G/4L; G/4L; G/4L; G/4L
POWDER, FOR SOLUTION ORAL
Qty: 1 BOTTLE | Refills: 0 | Status: SHIPPED | OUTPATIENT
Start: 2020-12-07 | End: 2020-12-15

## 2020-12-07 RX ORDER — DICYCLOMINE HYDROCHLORIDE 10 MG/1
10 CAPSULE ORAL 3 TIMES DAILY PRN
Qty: 60 CAPSULE | Refills: 2 | Status: SHIPPED | OUTPATIENT
Start: 2020-12-07 | End: 2022-11-29

## 2020-12-07 RX ORDER — BACLOFEN 5 MG/1
5 TABLET ORAL
COMMUNITY
Start: 2020-11-11 | End: 2020-12-28

## 2020-12-07 NOTE — PATIENT INSTRUCTIONS
GOLYTELY/ COLYTE/ NULYTELY Instructions    You are scheduled for a colonoscopy with Dr. Day on Wednesday, December 30 at [facility] located at [address] in [place].  Enter through the South Entrance #2 (by the cafeteria).  Go straight back down the garvin and check-in at Same Day Surgery.      You will receive a call 1-2 days before your colonoscopy to tell you the time to arrive.  If you have not received a call by the day before your procedure, call the Endoscopy Lab at [phone].    To ensure that your test is accurate and complete, you MUST follow these instructions listed below.  If you have any questions, please call our office at [phone].  Plan on being at the hospital for your procedure for 3-4 hours.    1.  Follow a CLEAR LIQUID DIET for the entire day before your scheduled colonoscopy.  This means no solid food the entire day starting when you wake.  You may have as much of the clear liquids as you want throughout the day.   CLEAR LIQUID DIET:   - Avoid Red, Orange, Purple, and/or Blue food coloring   - NO DAIRY   - You can have:  Coffee with sugar (no creamer), tea, water, soda, apple or white grape juice, chicken or beef broth/bouillon (no meat, noodles, or veggies), green/yellow popsicles, green/yellow Jell-O, lemonade.    2.  MIX GOLYTELY/COLYTE/NULYTELY (all names for same product) WITH ONE (1) GALLON OF WATER.  YOU MAY ADD A FLAVOR PACKET OR YELLOW/GREEN POWDER DRINK MIX TO THIS.  PUT IN REFRIGERATOR.  This is easier to drink if this solution is cold, so you can mix the solution one day ahead of time and place in the refrigerator prior to drinking.  You have to drink the solution within 24 hours of mixing it.  Do NOT put this solution over ice.  It IS ok to drink with a straw.    3. AT 5 PM THE DAY BEFORE YOUR COLONOSCOPY, DRINK ONE (1) 8 OUNCE GLASS OF MIXTURE EVERY 10 MINUTES UNTIL HALF OF THE GALLON IS CONSUMED.  Keep this mixture cold and in refrigerator as  much as you can while drinking it.  Place the remaining half of mixture in the refrigerator when you finish the first half.    4.  The endoscopy department will call you 1 day before your colonoscopy to tell you the exact time to arrive, AND to tell you the exact time to drink the 2nd portion of your prep (which will be FIVE HOURS BEFORE YOUR ARRIVAL TIME).  At this time given to you, DRINK ONE (1) 8 OUNCE GLASS OF MIXTURE EVERY 10 MINUTES UNTIL THE OTHER HALF IS CONSUMED. Keep the mixture cold while you are drinking it. Once this is complete, you may not have ANYTHING else by mouth!      5.  It is ok to take MOST of your REGULAR MEDICATIONS  in the morning of your test with a SIP of water.  THE ONLY MEDS YOU NEED TO HOLD ARE YOUR DIABETES MEDICATIONS,  SOME BLOOD PRESSURE MEDS, AND BLOOD THINNERS IF OK'D BY YOUR DOCTOR.  Do NOT have anything else to eat or drink the morning of your colonoscopy.  It is ok to brush your teeth.    6.  If you are on blood thinners THAT YOU HAVE BEEN INSTRUCTED TO HOLD BY YOUR DOCTOR FOR THIS PROCEDURE, then do NOT take this the morning of your colonoscopy.  Do NOT stop these medications on your own, they must be approved to be held by your doctor.  Your colonoscopy can NOT be done if you are on these medications.  Examples of blood thinners include: Coumadin, Aggrenox, Plavix, Pradaxa, Reapro, Pletal, Xarelto, Ticagrelor, Brilinta, Eliquis, and high dose aspirin (325 mg).  You do not have to stop baby aspirin 81 mg.    7.  IF YOU ARE DIABETIC:  NO INSULIN OR ORAL MEDICATIONS THE MORNING OF THE COLONOSCOPY.  TAKE ONLY HALF THE DOSE OF YOUR INSULIN THE DAY BEFORE THE COLONOSCOPY.  DO NOT TAKE ANY ORAL DIABETIC MEDICATIONS THE DAY BEFORE THE COLONOSCOPY.  IF YOU ARE AN INSULIN DEPENDENT DIABETIC WITH UNSTABLE BLOOD SUGARS, NOTIFY YOUR PRIMARY CARE PHYSICIAN FOR INSTRUCTIONS.    8.  Please DO use your inhalers the morning of your procedure.

## 2020-12-07 NOTE — PROGRESS NOTES
Subjective:       Patient ID: Caryn Rodriguez is a 70 y.o. female.    Chief Complaint: Consult (Dr. Galarza) and Abdominal Pain (lower left side x 2 months)    71 yo F complains of LLQ pain.  She states that she began to have abdominal pain, worse in the LLQ, in July 2020.  She saw Dr. Marquez who set her up for a peripheral angiogram at [facility] but the procedure was aborted due to abundance of clot.  She was rescheduled and ended up with extensive iliac stenting 8/2020 and was placed on Eliquis.  She had severe belly pain from Eliquis and was thus switched to Xarelto.  She then presented to the ED with hematochezia which had her transferred to [facility] where she was transfused and seen by [facility].  Dx at that time was ischemic colitis vs diverticular bleeding, but no colonoscopy done at that time.  She needed 5 units of PRBCs and bleeding stopped.  She was taken off Xarelto at that time and has remained on baby aspirin only, and has done ok without further bleeding.  But she continues to have the LLQ pain.  She states that it feels the same as her pre-stent pain, but she also states that it seems worse prior to a BM and mostly relieved by the BM.  She has 3-6 BM per day with quantity varying dramatically.  Prior to stent placement, she had alternating diarrhea/constipation but states since the procedure she has not skipped any days.  She has never had a colonoscopy.    Review of Systems   Constitutional: Negative for appetite change and unexpected weight change.   Eyes: Negative for photophobia and visual disturbance.   Respiratory: Negative for chest tightness, shortness of breath and wheezing.    Cardiovascular: Negative for chest pain, palpitations and leg swelling.   Gastrointestinal: Positive for abdominal pain.   Genitourinary: Negative for dysuria, flank pain and hematuria.   Musculoskeletal: Negative for joint swelling and myalgias.   Integumentary:  Negative for color change and rash.   Neurological: Negative  "for dizziness and speech difficulty.   Psychiatric/Behavioral: Negative for confusion and hallucinations.     Objective:       /88 (BP Location: Left arm, Patient Position: Sitting, BP Method: X-Large (Manual))   Pulse 68   Temp 97.5 °F (36.4 °C) (Other (see comments))   Resp 16   Ht 5' 3" (1.6 m)   Wt 82.6 kg (182 lb 3.2 oz)   LMP  (LMP Unknown)   SpO2 98%   BMI 32.28 kg/m²     Physical Exam  Constitutional:       Appearance: Normal appearance. She is well-developed.   HENT:      Head: Normocephalic and atraumatic.   Eyes:      Extraocular Movements: Extraocular movements intact.      Pupils: Pupils are equal, round, and reactive to light.   Neck:      Musculoskeletal: Normal range of motion and neck supple.   Cardiovascular:      Rate and Rhythm: Normal rate and regular rhythm.   Pulmonary:      Effort: Pulmonary effort is normal.      Breath sounds: Normal breath sounds.   Abdominal:      General: Bowel sounds are normal. There is no distension.      Palpations: Abdomen is soft.      Tenderness: There is no abdominal tenderness. There is no rebound.   Musculoskeletal: Normal range of motion.         General: No tenderness.   Neurological:      General: No focal deficit present.      Mental Status: She is alert and oriented to person, place, and time.   Psychiatric:         Behavior: Behavior normal.         Thought Content: Thought content normal.       Lab Results   Component Value Date    WBC 6.64 11/02/2020    HGB 13.2 11/02/2020    HCT 44.4 11/02/2020    MCV 92 11/02/2020     (H) 11/02/2020     CT was independently visualized and reviewed by me and showed common and external iliac stenting bilaterally, diverticulosis without diverticulitis.    Old records from Dr. Marquez reviewed and summarized, significant for:  Stenting to common and external iliac veins and common femoral veins bilaterally on 7/17/2020    Assessment:       1. Left lower quadrant abdominal pain    2. Rectal bleeding  "   3. Stage 3b chronic kidney disease    4. Screen for colon cancer    5. Preop examination        Plan:       Left lower quadrant abdominal pain  -     dicyclomine (BENTYL) 10 MG capsule; Take 1 capsule (10 mg total) by mouth 3 (three) times daily as needed.  Dispense: 60 capsule; Refill: 2    Rectal bleeding        -     Resolved        -     It is very difficult to determine whether this was ischemic colitis or diverticular bleeding as she was already having LLQ pain    Stage 3b chronic kidney disease        -     Golytely is safest prep for her given GFR and solitary kidney    Screen for colon cancer  -     Case Request Endoscopy: COLONOSCOPY  -     polyethylene glycol (GOLYTELY,NULYTELY) 236-22.74-6.74 -5.86 gram suspension; Use as directed  Dispense: 1 Bottle; Refill: 0    Preop examination  -     COVID-19 Routine Screening; Future; Expected date: 12/14/2020

## 2020-12-15 RX ORDER — POLYETHYLENE GLYCOL 3350, SODIUM SULFATE ANHYDROUS, SODIUM BICARBONATE, SODIUM CHLORIDE, POTASSIUM CHLORIDE 236; 22.74; 6.74; 5.86; 2.97 G/4L; G/4L; G/4L; G/4L; G/4L
4 POWDER, FOR SOLUTION ORAL ONCE
Qty: 1 BOTTLE | Refills: 0 | Status: SHIPPED | OUTPATIENT
Start: 2020-12-15 | End: 2020-12-15

## 2020-12-23 ENCOUNTER — TELEPHONE (OUTPATIENT)
Dept: FAMILY MEDICINE | Facility: CLINIC | Age: 70
End: 2020-12-23

## 2020-12-23 NOTE — TELEPHONE ENCOUNTER
----- Message from Edith Cabrales sent at 12/23/2020  1:08 PM CST -----  Contact: [phone]  Type:  Needs Medical Advice    Who Called: Pt  Reason for ubaldo;l; pt would like to speak with the Nurse regarding some Questions regarding her Colonoscopy, pt is not sure if she needs to be on a Liquid diet on the 28/ and 29  Would the patient rather a call back or a response via [facility]?  Call back  Best Call Back Number: [phone]

## 2020-12-28 ENCOUNTER — LAB VISIT (OUTPATIENT)
Dept: FAMILY MEDICINE | Facility: CLINIC | Age: 70
End: 2020-12-28
Payer: COMMERCIAL

## 2020-12-28 DIAGNOSIS — Z01.818 PREOP EXAMINATION: ICD-10-CM

## 2020-12-28 LAB — SARS-COV-2 RNA RESP QL NAA+PROBE: NOT DETECTED

## 2020-12-28 PROCEDURE — U0003 INFECTIOUS AGENT DETECTION BY NUCLEIC ACID (DNA OR RNA); SEVERE ACUTE RESPIRATORY SYNDROME CORONAVIRUS 2 (SARS-COV-2) (CORONAVIRUS DISEASE [COVID-19]), AMPLIFIED PROBE TECHNIQUE, MAKING USE OF HIGH THROUGHPUT TECHNOLOGIES AS DESCRIBED BY CMS-2020-01-R: HCPCS

## 2021-01-06 ENCOUNTER — TELEPHONE (OUTPATIENT)
Dept: GASTROENTEROLOGY | Facility: CLINIC | Age: 71
End: 2021-01-06

## 2021-01-07 ENCOUNTER — TELEPHONE (OUTPATIENT)
Dept: GASTROENTEROLOGY | Facility: CLINIC | Age: 71
End: 2021-01-07

## 2021-02-22 ENCOUNTER — OFFICE VISIT (OUTPATIENT)
Dept: PULMONOLOGY | Facility: CLINIC | Age: 71
End: 2021-02-22
Payer: MEDICARE

## 2021-02-22 VITALS
RESPIRATION RATE: 18 BRPM | SYSTOLIC BLOOD PRESSURE: 145 MMHG | OXYGEN SATURATION: 96 % | BODY MASS INDEX: 31.36 KG/M2 | HEART RATE: 114 BPM | WEIGHT: 177 LBS | HEIGHT: 63 IN | DIASTOLIC BLOOD PRESSURE: 64 MMHG

## 2021-02-22 DIAGNOSIS — J43.2 CENTRILOBULAR EMPHYSEMA: Primary | ICD-10-CM

## 2021-02-22 DIAGNOSIS — E66.9 OBESITY, UNSPECIFIED CLASSIFICATION, UNSPECIFIED OBESITY TYPE, UNSPECIFIED WHETHER SERIOUS COMORBIDITY PRESENT: ICD-10-CM

## 2021-02-22 DIAGNOSIS — Z12.2 ENCOUNTER FOR SCREENING FOR MALIGNANT NEOPLASM OF RESPIRATORY ORGANS: ICD-10-CM

## 2021-02-22 PROCEDURE — 99214 OFFICE O/P EST MOD 30 MIN: CPT | Mod: S$PBB,,, | Performed by: INTERNAL MEDICINE

## 2021-02-22 PROCEDURE — 99214 PR OFFICE/OUTPT VISIT, EST, LEVL IV, 30-39 MIN: ICD-10-PCS | Mod: S$PBB,,, | Performed by: INTERNAL MEDICINE

## 2021-02-22 PROCEDURE — 99214 OFFICE O/P EST MOD 30 MIN: CPT | Mod: PBBFAC,PN | Performed by: INTERNAL MEDICINE

## 2021-02-22 PROCEDURE — 99999 PR PBB SHADOW E&M-EST. PATIENT-LVL IV: ICD-10-PCS | Mod: PBBFAC,,, | Performed by: INTERNAL MEDICINE

## 2021-02-22 PROCEDURE — 99999 PR PBB SHADOW E&M-EST. PATIENT-LVL IV: CPT | Mod: PBBFAC,,, | Performed by: INTERNAL MEDICINE

## 2021-02-22 RX ORDER — ALBUTEROL SULFATE 1.25 MG/3ML
1.25 SOLUTION RESPIRATORY (INHALATION) EVERY 6 HOURS PRN
Qty: 3 BOX | Refills: 3 | Status: SHIPPED | OUTPATIENT
Start: 2021-02-22 | End: 2024-02-02 | Stop reason: SDUPTHER

## 2021-02-22 RX ORDER — MONTELUKAST SODIUM 10 MG/1
10 TABLET ORAL NIGHTLY
Qty: 90 TABLET | Refills: 3 | Status: SHIPPED | OUTPATIENT
Start: 2021-02-22 | End: 2021-03-24

## 2021-02-22 RX ORDER — ALBUTEROL SULFATE 90 UG/1
2 AEROSOL, METERED RESPIRATORY (INHALATION) EVERY 6 HOURS PRN
Qty: 8 G | Refills: 6 | Status: CANCELLED | OUTPATIENT
Start: 2021-02-22

## 2021-03-18 ENCOUNTER — TELEPHONE (OUTPATIENT)
Dept: NEPHROLOGY | Facility: CLINIC | Age: 71
End: 2021-03-18

## 2021-05-13 ENCOUNTER — OFFICE VISIT (OUTPATIENT)
Dept: URGENT CARE | Facility: CLINIC | Age: 71
End: 2021-05-13
Payer: MEDICARE

## 2021-05-13 VITALS
OXYGEN SATURATION: 98 % | HEART RATE: 108 BPM | WEIGHT: 179 LBS | RESPIRATION RATE: 20 BRPM | TEMPERATURE: 97 F | BODY MASS INDEX: 31.71 KG/M2 | SYSTOLIC BLOOD PRESSURE: 152 MMHG | HEIGHT: 63 IN | DIASTOLIC BLOOD PRESSURE: 77 MMHG

## 2021-05-13 DIAGNOSIS — J44.1 CHRONIC OBSTRUCTIVE PULMONARY DISEASE WITH ACUTE EXACERBATION: Primary | ICD-10-CM

## 2021-05-13 DIAGNOSIS — R05.9 COUGH: ICD-10-CM

## 2021-05-13 DIAGNOSIS — R07.89 CHEST HEAVINESS: ICD-10-CM

## 2021-05-13 DIAGNOSIS — J02.9 SORE THROAT: ICD-10-CM

## 2021-05-13 LAB
CTP QC/QA: YES
CTP QC/QA: YES
MOLECULAR STREP A: NEGATIVE
SARS-COV-2 RDRP RESP QL NAA+PROBE: NEGATIVE

## 2021-05-13 PROCEDURE — U0002 COVID-19 LAB TEST NON-CDC: HCPCS | Mod: QW,CR,S$GLB, | Performed by: PHYSICIAN ASSISTANT

## 2021-05-13 PROCEDURE — 87651 POCT STREP A MOLECULAR: ICD-10-PCS | Mod: QW,S$GLB,, | Performed by: PHYSICIAN ASSISTANT

## 2021-05-13 PROCEDURE — 71046 XR CHEST PA AND LATERAL: ICD-10-PCS | Mod: FY,S$GLB,, | Performed by: RADIOLOGY

## 2021-05-13 PROCEDURE — U0002: ICD-10-PCS | Mod: QW,CR,S$GLB, | Performed by: PHYSICIAN ASSISTANT

## 2021-05-13 PROCEDURE — 71046 X-RAY EXAM CHEST 2 VIEWS: CPT | Mod: FY,S$GLB,, | Performed by: RADIOLOGY

## 2021-05-13 PROCEDURE — 87651 STREP A DNA AMP PROBE: CPT | Mod: QW,S$GLB,, | Performed by: PHYSICIAN ASSISTANT

## 2021-05-13 PROCEDURE — 99214 PR OFFICE/OUTPT VISIT, EST, LEVL IV, 30-39 MIN: ICD-10-PCS | Mod: S$GLB,,, | Performed by: PHYSICIAN ASSISTANT

## 2021-05-13 PROCEDURE — 99214 OFFICE O/P EST MOD 30 MIN: CPT | Mod: S$GLB,,, | Performed by: PHYSICIAN ASSISTANT

## 2021-05-13 RX ORDER — LEVOFLOXACIN 500 MG/1
500 TABLET, FILM COATED ORAL DAILY
Qty: 3 TABLET | Refills: 0 | Status: SHIPPED | OUTPATIENT
Start: 2021-05-13 | End: 2021-05-13 | Stop reason: DRUGHIGH

## 2021-05-13 RX ORDER — LEVOFLOXACIN 500 MG/1
TABLET, FILM COATED ORAL
Qty: 2 TABLET | Refills: 0 | Status: SHIPPED | OUTPATIENT
Start: 2021-05-13 | End: 2021-06-03 | Stop reason: ALTCHOICE

## 2021-05-24 ENCOUNTER — OFFICE VISIT (OUTPATIENT)
Dept: PULMONOLOGY | Facility: CLINIC | Age: 71
End: 2021-05-24
Payer: MEDICARE

## 2021-05-24 VITALS
OXYGEN SATURATION: 97 % | HEIGHT: 63 IN | HEART RATE: 89 BPM | RESPIRATION RATE: 18 BRPM | BODY MASS INDEX: 31.03 KG/M2 | DIASTOLIC BLOOD PRESSURE: 82 MMHG | SYSTOLIC BLOOD PRESSURE: 135 MMHG | WEIGHT: 175.13 LBS

## 2021-05-24 DIAGNOSIS — Z87.891 PERSONAL HISTORY OF NICOTINE DEPENDENCE: ICD-10-CM

## 2021-05-24 DIAGNOSIS — E66.9 OBESITY, UNSPECIFIED CLASSIFICATION, UNSPECIFIED OBESITY TYPE, UNSPECIFIED WHETHER SERIOUS COMORBIDITY PRESENT: ICD-10-CM

## 2021-05-24 DIAGNOSIS — Z12.2 ENCOUNTER FOR SCREENING FOR MALIGNANT NEOPLASM OF RESPIRATORY ORGANS: ICD-10-CM

## 2021-05-24 DIAGNOSIS — J43.2 CENTRILOBULAR EMPHYSEMA: Primary | ICD-10-CM

## 2021-05-24 PROCEDURE — 99213 OFFICE O/P EST LOW 20 MIN: CPT | Mod: S$PBB,,, | Performed by: INTERNAL MEDICINE

## 2021-05-24 PROCEDURE — 99999 PR PBB SHADOW E&M-EST. PATIENT-LVL V: ICD-10-PCS | Mod: PBBFAC,,, | Performed by: INTERNAL MEDICINE

## 2021-05-24 PROCEDURE — 99215 OFFICE O/P EST HI 40 MIN: CPT | Mod: PBBFAC,PN | Performed by: INTERNAL MEDICINE

## 2021-05-24 PROCEDURE — 99213 PR OFFICE/OUTPT VISIT, EST, LEVL III, 20-29 MIN: ICD-10-PCS | Mod: S$PBB,,, | Performed by: INTERNAL MEDICINE

## 2021-05-24 PROCEDURE — 99999 PR PBB SHADOW E&M-EST. PATIENT-LVL V: CPT | Mod: PBBFAC,,, | Performed by: INTERNAL MEDICINE

## 2021-06-24 DIAGNOSIS — R91.1 PULMONARY NODULE: Primary | ICD-10-CM

## 2021-06-28 ENCOUNTER — TELEPHONE (OUTPATIENT)
Dept: PULMONOLOGY | Facility: CLINIC | Age: 71
End: 2021-06-28

## 2021-07-15 ENCOUNTER — TELEPHONE (OUTPATIENT)
Dept: NEPHROLOGY | Facility: CLINIC | Age: 71
End: 2021-07-15

## 2021-07-28 ENCOUNTER — TELEPHONE (OUTPATIENT)
Dept: ADMINISTRATIVE | Facility: OTHER | Age: 71
End: 2021-07-28

## 2021-08-02 ENCOUNTER — TELEPHONE (OUTPATIENT)
Dept: PULMONOLOGY | Facility: CLINIC | Age: 71
End: 2021-08-02

## 2022-01-24 DIAGNOSIS — R91.1 SOLITARY PULMONARY NODULE: ICD-10-CM

## 2022-01-24 DIAGNOSIS — R91.1 PULMONARY NODULE: Primary | ICD-10-CM

## 2022-05-25 ENCOUNTER — OFFICE VISIT (OUTPATIENT)
Dept: FAMILY MEDICINE | Facility: CLINIC | Age: 72
End: 2022-05-25
Payer: MEDICARE

## 2022-05-25 VITALS
OXYGEN SATURATION: 98 % | TEMPERATURE: 98 F | DIASTOLIC BLOOD PRESSURE: 84 MMHG | HEART RATE: 81 BPM | BODY MASS INDEX: 30.66 KG/M2 | HEIGHT: 63 IN | WEIGHT: 173.06 LBS | SYSTOLIC BLOOD PRESSURE: 130 MMHG

## 2022-05-25 DIAGNOSIS — I73.9 PAD (PERIPHERAL ARTERY DISEASE): ICD-10-CM

## 2022-05-25 DIAGNOSIS — G47.62 NOCTURNAL LEG CRAMPS: ICD-10-CM

## 2022-05-25 DIAGNOSIS — I12.9 BENIGN HYPERTENSION WITH CKD (CHRONIC KIDNEY DISEASE) STAGE III: ICD-10-CM

## 2022-05-25 DIAGNOSIS — J43.2 CENTRILOBULAR EMPHYSEMA: ICD-10-CM

## 2022-05-25 DIAGNOSIS — I50.32 CHRONIC DIASTOLIC HEART FAILURE: ICD-10-CM

## 2022-05-25 DIAGNOSIS — Z12.31 ENCOUNTER FOR SCREENING MAMMOGRAM FOR MALIGNANT NEOPLASM OF BREAST: ICD-10-CM

## 2022-05-25 DIAGNOSIS — E78.2 MIXED HYPERLIPIDEMIA: ICD-10-CM

## 2022-05-25 DIAGNOSIS — N18.30 BENIGN HYPERTENSION WITH CKD (CHRONIC KIDNEY DISEASE) STAGE III: ICD-10-CM

## 2022-05-25 DIAGNOSIS — G89.4 CHRONIC PAIN SYNDROME: ICD-10-CM

## 2022-05-25 DIAGNOSIS — I25.718 CORONARY ARTERY DISEASE OF AUTOLOGOUS VEIN BYPASS GRAFT WITH STABLE ANGINA PECTORIS: ICD-10-CM

## 2022-05-25 DIAGNOSIS — E79.0 HYPERURICEMIA: ICD-10-CM

## 2022-05-25 DIAGNOSIS — Z76.89 ENCOUNTER TO ESTABLISH CARE WITH NEW DOCTOR: Primary | ICD-10-CM

## 2022-05-25 DIAGNOSIS — I11.0 HYPERTENSIVE HEART DISEASE WITH HEART FAILURE: ICD-10-CM

## 2022-05-25 DIAGNOSIS — Z23 NEED FOR VACCINATION: ICD-10-CM

## 2022-05-25 DIAGNOSIS — K59.1 FUNCTIONAL DIARRHEA: ICD-10-CM

## 2022-05-25 PROBLEM — Z12.11 SCREEN FOR COLON CANCER: Status: RESOLVED | Noted: 2020-12-07 | Resolved: 2022-05-25

## 2022-05-25 PROBLEM — N25.81 SECONDARY HYPERPARATHYROIDISM OF RENAL ORIGIN: Status: RESOLVED | Noted: 2017-05-01 | Resolved: 2022-05-25

## 2022-05-25 PROBLEM — N28.1 RENAL CYST: Status: RESOLVED | Noted: 2017-05-01 | Resolved: 2022-05-25

## 2022-05-25 PROBLEM — N20.0 KIDNEY STONE: Status: RESOLVED | Noted: 2017-05-01 | Resolved: 2022-05-25

## 2022-05-25 PROBLEM — M54.16 LEFT LUMBAR RADICULITIS: Status: RESOLVED | Noted: 2020-10-23 | Resolved: 2022-05-25

## 2022-05-25 PROBLEM — M47.896 OTHER SPONDYLOSIS, LUMBAR REGION: Status: RESOLVED | Noted: 2020-03-13 | Resolved: 2022-05-25

## 2022-05-25 PROBLEM — R82.90 ABNORMAL URINALYSIS: Status: RESOLVED | Noted: 2017-09-01 | Resolved: 2022-05-25

## 2022-05-25 PROBLEM — R10.32 LEFT LOWER QUADRANT ABDOMINAL PAIN: Status: RESOLVED | Noted: 2020-08-07 | Resolved: 2022-05-25

## 2022-05-25 PROBLEM — E87.20 LACTIC ACIDOSIS: Status: RESOLVED | Noted: 2020-08-06 | Resolved: 2022-05-25

## 2022-05-25 PROBLEM — M54.16 ACUTE LEFT LUMBAR RADICULOPATHY: Status: RESOLVED | Noted: 2020-01-31 | Resolved: 2022-05-25

## 2022-05-25 PROBLEM — K62.5 RECTAL BLEEDING: Status: RESOLVED | Noted: 2020-12-07 | Resolved: 2022-05-25

## 2022-05-25 PROBLEM — M19.90 ARTHRITIS: Status: RESOLVED | Noted: 2017-05-01 | Resolved: 2022-05-25

## 2022-05-25 PROBLEM — R91.1 PULMONARY NODULE: Status: RESOLVED | Noted: 2017-07-30 | Resolved: 2022-05-25

## 2022-05-25 PROCEDURE — 99214 OFFICE O/P EST MOD 30 MIN: CPT | Mod: PBBFAC,PN,25 | Performed by: STUDENT IN AN ORGANIZED HEALTH CARE EDUCATION/TRAINING PROGRAM

## 2022-05-25 PROCEDURE — 99999 PR PBB SHADOW E&M-EST. PATIENT-LVL IV: ICD-10-PCS | Mod: PBBFAC,,, | Performed by: STUDENT IN AN ORGANIZED HEALTH CARE EDUCATION/TRAINING PROGRAM

## 2022-05-25 PROCEDURE — 99999 PR PBB SHADOW E&M-EST. PATIENT-LVL IV: CPT | Mod: PBBFAC,,, | Performed by: STUDENT IN AN ORGANIZED HEALTH CARE EDUCATION/TRAINING PROGRAM

## 2022-05-25 PROCEDURE — G0009 ADMIN PNEUMOCOCCAL VACCINE: HCPCS | Mod: PBBFAC,PN

## 2022-05-25 PROCEDURE — 99204 PR OFFICE/OUTPT VISIT, NEW, LEVL IV, 45-59 MIN: ICD-10-PCS | Mod: S$PBB,,, | Performed by: STUDENT IN AN ORGANIZED HEALTH CARE EDUCATION/TRAINING PROGRAM

## 2022-05-25 PROCEDURE — 99204 OFFICE O/P NEW MOD 45 MIN: CPT | Mod: S$PBB,,, | Performed by: STUDENT IN AN ORGANIZED HEALTH CARE EDUCATION/TRAINING PROGRAM

## 2022-05-25 RX ORDER — METOPROLOL SUCCINATE 50 MG/1
50 TABLET, EXTENDED RELEASE ORAL 2 TIMES DAILY
COMMUNITY
End: 2022-12-12

## 2022-05-25 RX ORDER — ROSUVASTATIN CALCIUM 20 MG/1
20 TABLET, COATED ORAL DAILY
COMMUNITY

## 2022-05-25 NOTE — PROGRESS NOTES
Subjective:       Patient ID: Caryn Rodriguez is a 72 y.o. female.    Chief Complaint: Follow-up and Establish Care (Pt here to est care)    Coronary artery disease involving autologous vein bypass graft  S/p double bypass 2005  Cards [facility]   Asa, statin, BB      Stage 3 chronic kidney disease  Nephrology Fremin q6m    COPD (chronic obstructive pulmonary disease)  Sees pulm  Trilogy and albuterol prn  combivent > $200 and too expensive   Montelukast daily     Carotid artery disease  Sees geovanna   ASA, statin, BB    Chronic diastolic heart failure  Managed by Geovanna  Asa, statin, BB, spironolactone   Notes issues with low potassium     Benign hypertension with CKD (chronic kidney disease) stage III  Managed by [facility]   Stable  asymptomatic   Metoprolol 50 BID; spironolactone    Sees nephrology   Stable renal fxn     HLD (hyperlipidemia)  crestor 20  No complaints    PAD (peripheral artery disease)  managed by Geovanna  Stents each leg  statin    Hyperuricemia  Allopurinol daily     Chronic pain syndrome  Cymbalta 30 daily   No complaints     Nocturnal leg cramps  Taking Mag ox 1000 BID   C/O diahrea   Open to alternatives to see if will help cramps and decrease BMs  Without in past leg cramps have been unbearable and debilitating       Review of Systems   Constitutional: Negative for fever.   HENT: Negative.    Respiratory: Negative for cough, shortness of breath and wheezing.    Cardiovascular: Negative for chest pain and leg swelling.   Gastrointestinal: Positive for diarrhea. Negative for abdominal pain, nausea and vomiting.   Genitourinary: Negative.  Negative for difficulty urinating, dysuria and frequency.   Musculoskeletal: Positive for arthralgias, back pain and myalgias.   Neurological: Negative.  Negative for dizziness, numbness and headaches.   Psychiatric/Behavioral: Positive for sleep disturbance (cramps). Negative for dysphoric mood. The patient is not nervous/anxious.       Objective:      Vitals:     05/25/22 1405   BP: 130/84   Pulse: 81   Temp: 97.7 °F (36.5 °C)      Physical Exam  Constitutional:       Appearance: Normal appearance. She is obese.   HENT:      Head: Normocephalic and atraumatic.   Eyes:      Conjunctiva/sclera: Conjunctivae normal.   Cardiovascular:      Rate and Rhythm: Normal rate and regular rhythm.      Heart sounds: Normal heart sounds.   Pulmonary:      Effort: Pulmonary effort is normal.      Breath sounds: Normal breath sounds.   Abdominal:      Palpations: Abdomen is soft.      Tenderness: There is no abdominal tenderness.   Musculoskeletal:         General: Normal range of motion.      Cervical back: Normal range of motion.      Right lower leg: No edema.      Left lower leg: No edema.   Neurological:      General: No focal deficit present.      Mental Status: She is alert and oriented to person, place, and time.   Psychiatric:         Mood and Affect: Mood normal.         Behavior: Behavior normal.          Assessment:       1. Encounter to establish care with new doctor    2. 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 Nocturnal leg cramps    4. Functional diarrhea    5. Hypertensive heart disease with heart failure    6. Centrilobular emphysema    7. Chronic diastolic heart failure    8. Benign hypertension with CKD (chronic kidney disease) stage III    9. Mixed hyperlipidemia    10. PAD (peripheral artery disease)    11. Hyperuricemia    12. Chronic pain syndrome    13. Encounter for screening mammogram for malignant neoplasm of breast    14. Need for vaccination        Plan:   1. Encounter to establish care with new doctor    2. 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 Nocturnal leg cramps    4. Functional diarrhea    5. Hypertensive heart disease with heart failure    6. Centrilobular emphysema    7. Chronic diastolic heart failure    8. Benign hypertension with CKD (chronic kidney  disease) stage III    9. Mixed hyperlipidemia    10. PAD (peripheral artery disease)    11. Hyperuricemia    12. Chronic pain syndrome    13. Encounter for screening mammogram for malignant neoplasm of breast  - Mammo Digital Screening Bilat w/ Phillip; Future    14. Need for vaccination  - (In Office Administered) Pneumococcal Polysaccharide Vaccine (23 Valent) (SQ/IM)      Establish care  Labs per orders  HM discussed: mammo ordered; pt declined DEXA; PCV23 today; shingles and TDAP discussed   Continue healthy lifestyle efforts  Stop Mag Ox to see if helps improve diarrhea  Start vit E 800mg before bed and B complex TID  Continue current meds as prescribed  Keep routine specialist f/u   RTC in 6 months and/or PRN          Sarah A. Champagne [facility]   5/25/22

## 2022-05-25 NOTE — ASSESSMENT & PLAN NOTE
Taking Mag ox 1000 BID   C/O diahrea   Open to alternatives to see if will help cramps and decrease BMs  Without in past leg cramps have been unbearable and debilitating

## 2022-05-25 NOTE — LETTER
AUTHORIZATION FOR RELEASE OF   CONFIDENTIAL INFORMATION    Dear Dr. Art Marquez,    We are seeing Caryn Rodriguez, date of birth 1950, in the clinic at [facility]. Patria Marsh DO is the patient's PCP. Caryn Rodriguez has an outstanding lab/procedure at the time we reviewed her chart. In order to help keep her health information updated, she has authorized us to request the following medical record(s):        (  )  MAMMOGRAM                                      (  )  COLONOSCOPY      (  )  PAP SMEAR                                          ( x )  OUTSIDE LAB RESULTS     (  )  DEXA SCAN                                          (  )  EYE EXAM            (  )  FOOT EXAM                                          (  )  ENTIRE RECORD     (  )  OUTSIDE IMMUNIZATIONS                 (  )  _______________         Please fax records to [facility], Sarah A. Champagne, DO, [phone]     If you have any questions, please contact Aye at [phone]          Patient Name: Caryn Rodriguez  : 1950  Patient Phone #: [phone]

## 2022-05-25 NOTE — ASSESSMENT & PLAN NOTE
Managed by [facility]   Stable  asymptomatic   Metoprolol 50 BID; spironolactone    Sees nephrology   Stable renal fxn

## 2022-05-31 ENCOUNTER — PATIENT MESSAGE (OUTPATIENT)
Dept: ADMINISTRATIVE | Facility: HOSPITAL | Age: 72
End: 2022-05-31
Payer: MEDICARE

## 2022-08-31 DIAGNOSIS — Z78.0 MENOPAUSE: ICD-10-CM

## 2022-09-07 ENCOUNTER — PATIENT MESSAGE (OUTPATIENT)
Dept: ADMINISTRATIVE | Facility: HOSPITAL | Age: 72
End: 2022-09-07
Payer: MEDICARE

## 2022-09-19 ENCOUNTER — PES CALL (OUTPATIENT)
Dept: ADMINISTRATIVE | Facility: OTHER | Age: 72
End: 2022-09-19
Payer: MEDICARE

## 2022-09-30 ENCOUNTER — PES CALL (OUTPATIENT)
Dept: ADMINISTRATIVE | Facility: CLINIC | Age: 72
End: 2022-09-30
Payer: MEDICARE

## 2022-11-29 ENCOUNTER — OFFICE VISIT (OUTPATIENT)
Dept: FAMILY MEDICINE | Facility: CLINIC | Age: 72
End: 2022-11-29
Payer: MEDICARE

## 2022-11-29 VITALS
BODY MASS INDEX: 31.35 KG/M2 | OXYGEN SATURATION: 95 % | SYSTOLIC BLOOD PRESSURE: 136 MMHG | DIASTOLIC BLOOD PRESSURE: 84 MMHG | WEIGHT: 176.94 LBS | HEART RATE: 91 BPM | TEMPERATURE: 98 F | HEIGHT: 63 IN

## 2022-11-29 DIAGNOSIS — I12.9 BENIGN HYPERTENSION WITH CKD (CHRONIC KIDNEY DISEASE) STAGE III: ICD-10-CM

## 2022-11-29 DIAGNOSIS — J44.9 CHRONIC OBSTRUCTIVE PULMONARY DISEASE, UNSPECIFIED COPD TYPE: ICD-10-CM

## 2022-11-29 DIAGNOSIS — I25.718 CORONARY ARTERY DISEASE OF AUTOLOGOUS VEIN BYPASS GRAFT WITH STABLE ANGINA PECTORIS: ICD-10-CM

## 2022-11-29 DIAGNOSIS — I73.9 PAD (PERIPHERAL ARTERY DISEASE): ICD-10-CM

## 2022-11-29 DIAGNOSIS — E78.2 MIXED HYPERLIPIDEMIA: ICD-10-CM

## 2022-11-29 DIAGNOSIS — G47.62 NOCTURNAL LEG CRAMPS: ICD-10-CM

## 2022-11-29 DIAGNOSIS — E79.0 HYPERURICEMIA: ICD-10-CM

## 2022-11-29 DIAGNOSIS — I65.29 STENOSIS OF CAROTID ARTERY, UNSPECIFIED LATERALITY: ICD-10-CM

## 2022-11-29 DIAGNOSIS — I50.32 CHRONIC DIASTOLIC HEART FAILURE: ICD-10-CM

## 2022-11-29 DIAGNOSIS — N18.30 BENIGN HYPERTENSION WITH CKD (CHRONIC KIDNEY DISEASE) STAGE III: ICD-10-CM

## 2022-11-29 DIAGNOSIS — G89.4 CHRONIC PAIN SYNDROME: ICD-10-CM

## 2022-11-29 PROCEDURE — 99214 PR OFFICE/OUTPT VISIT, EST, LEVL IV, 30-39 MIN: ICD-10-PCS | Mod: S$PBB,,, | Performed by: STUDENT IN AN ORGANIZED HEALTH CARE EDUCATION/TRAINING PROGRAM

## 2022-11-29 PROCEDURE — 99215 OFFICE O/P EST HI 40 MIN: CPT | Mod: PBBFAC,PN | Performed by: STUDENT IN AN ORGANIZED HEALTH CARE EDUCATION/TRAINING PROGRAM

## 2022-11-29 PROCEDURE — 99999 PR PBB SHADOW E&M-EST. PATIENT-LVL V: ICD-10-PCS | Mod: PBBFAC,,, | Performed by: STUDENT IN AN ORGANIZED HEALTH CARE EDUCATION/TRAINING PROGRAM

## 2022-11-29 PROCEDURE — 99999 PR PBB SHADOW E&M-EST. PATIENT-LVL V: CPT | Mod: PBBFAC,,, | Performed by: STUDENT IN AN ORGANIZED HEALTH CARE EDUCATION/TRAINING PROGRAM

## 2022-11-29 PROCEDURE — 99214 OFFICE O/P EST MOD 30 MIN: CPT | Mod: S$PBB,,, | Performed by: STUDENT IN AN ORGANIZED HEALTH CARE EDUCATION/TRAINING PROGRAM

## 2022-11-29 RX ORDER — VITAMIN B COMPLEX
1 CAPSULE ORAL DAILY
COMMUNITY

## 2022-11-29 RX ORDER — PNV NO.95/FERROUS FUM/FOLIC AC 28MG-0.8MG
1000 TABLET ORAL DAILY
COMMUNITY

## 2022-12-03 NOTE — PROGRESS NOTES
Subjective:       Patient ID: Caryn Rodriguez is a 72 y.o. female.    Chief Complaint: Follow-up (6 month follow up )    Patient Active Problem List    Diagnosis Date Noted    Chronic pain syndrome 05/25/2022     cymbalta and baclofen  Stable       Nocturnal leg cramps 05/25/2022     Stopped mag 2/2 diarrhea and this stopped   She is taking baclofen 5       Chronic diastolic heart failure 01/07/2020     Follows with Alma  Stable  BB, spironolactone, statin      Coronary artery disease involving autologous vein bypass graft 09/01/2017     S/p double bypass  Alma   Asa, statin, BB   stable      Benign hypertension with CKD (chronic kidney disease) stage III 05/01/2017     BP well controlled   Metoprolol 50; spironolactone 25  Asymptomatic       Hyperuricemia 05/01/2017     Allopurinol 100  Stable   Well tolerated       GERD (gastroesophageal reflux disease) 05/01/2017    COPD (chronic obstructive pulmonary disease) 05/01/2017     treguillaume Franklin  pulm retired; looking for new one but currently stable on regimen and just needs refills       HLD (hyperlipidemia) 05/01/2017     crestor 20   Well tolerated       PAD (peripheral artery disease) 05/01/2017     Follows with Alma   B/l stents   statin      Carotid artery disease 05/01/2017     Alma   medically managed  Statin, asa, BB          Review of Systems   Constitutional:  Negative for fever.   HENT: Negative.     Respiratory:  Negative for cough, shortness of breath and wheezing.    Cardiovascular:  Negative for chest pain and leg swelling.   Gastrointestinal:  Negative for abdominal pain, diarrhea, nausea and vomiting.   Genitourinary: Negative.  Negative for difficulty urinating, dysuria and frequency.   Musculoskeletal: Negative.    Neurological: Negative.  Negative for dizziness, numbness and headaches.   Psychiatric/Behavioral:  Negative for dysphoric mood. The patient is not nervous/anxious.       A1C:      CBC:  Recent Labs   Lab 03/30/21  0919  10/04/21  1051 04/06/22  1121   WBC 7.26 8.66 8.06   RBC 4.37 4.73 4.80   Hemoglobin 13.1 12.2 13.1   Hematocrit 40.2 40.9 41.4   Platelets 278 446 394   MCV 92 87 86   MCH 30.0 25.8 L 27.3   MCHC 32.6 29.8 L 31.6 L     CMP:  Recent Labs   Lab 03/30/21  0919 10/04/21  1051 10/21/21  1141 04/06/22  1121   Glucose 97 105   < > 116 H  116 H   Calcium 9.4 9.4   < > 9.2  9.2   Albumin 3.8 4.0  --  4.3   Total Protein 7.1 7.2  --  7.7   Sodium 146 H 142   < > 144  144   Potassium 4.5 4.7   < > 4.8  4.8   CO2 23 24   < > 24  24   Chloride 112 H 111 H   < > 110  110   BUN 16 15   < > 20 H  20 H   Creatinine 1.05 1.09   < > 1.07  1.07   Alkaline Phosphatase 111 122  --  105   ALT 22 15  --  18   AST 27 32  --  31   Total Bilirubin 0.4 0.3  --  0.3    < > = values in this interval not displayed.     LIPIDS:  Recent Labs   Lab 01/06/20  1451   TSH 1.480     TSH:  Recent Labs   Lab 01/06/20  1451   TSH 1.480        Objective:      Vitals:    11/29/22 1436   BP: 136/84   Pulse: 91   Temp: 98.1 °F (36.7 °C)      Physical Exam  Vitals reviewed.   Constitutional:       Appearance: Normal appearance. She is obese.   HENT:      Head: Normocephalic and atraumatic.   Eyes:      Conjunctiva/sclera: Conjunctivae normal.   Cardiovascular:      Rate and Rhythm: Normal rate and regular rhythm.      Heart sounds: Normal heart sounds.   Pulmonary:      Effort: Pulmonary effort is normal.      Breath sounds: Normal breath sounds.   Abdominal:      Palpations: Abdomen is soft.      Tenderness: There is no abdominal tenderness.   Musculoskeletal:         General: Normal range of motion.      Cervical back: Normal range of motion.      Right lower leg: No edema.      Left lower leg: No edema.   Neurological:      General: No focal deficit present.      Mental Status: She is alert and oriented to person, place, and time.   Psychiatric:         Mood and Affect: Mood normal.         Behavior: Behavior normal.        Assessment:       1.  Chronic obstructive pulmonary disease, unspecified COPD type    2. Benign hypertension with CKD (chronic kidney disease) stage III    3. Mixed hyperlipidemia    4. PAD (peripheral artery disease)    5. Stenosis of carotid artery, unspecified laterality    6. 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 Chronic diastolic heart failure    8. Hyperuricemia    9. Nocturnal leg cramps    10. Chronic pain syndrome          Plan:   1. Chronic obstructive pulmonary disease, unspecified COPD type    2. Benign hypertension with CKD (chronic kidney disease) stage III    3. Mixed hyperlipidemia    4. PAD (peripheral artery disease)    5. Stenosis of carotid artery, unspecified laterality    6. 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 Chronic diastolic heart failure    8. Hyperuricemia    9. Nocturnal leg cramps    10. Chronic pain syndrome       Labs per Alma; will request copy  Continue healthy lifestyle efforts  Continue current meds as prescribed  Keep routine specialist f/u   RTC in 6 months and/or PRN          Sarah A. Champagne [facility]   11/29/22

## 2023-02-14 ENCOUNTER — TELEPHONE (OUTPATIENT)
Dept: FAMILY MEDICINE | Facility: CLINIC | Age: 73
End: 2023-02-14
Payer: MEDICARE

## 2023-02-14 NOTE — TELEPHONE ENCOUNTER
Pt hit her toe on a bed post back in December, thought the toe nail was going to fall off, but it never did. Pt said that her toe is now red, throbbing, hurts to wear shoes, big toe on left foot. Pt wants to know If the medication she has is okay to take or if she needs to come in to be seen. Please advise.     Azithromycin-6pills says take 2 the first day then one each day for 4 days. Expires in march of 2023    given from dr alvarado who is a dentist.

## 2023-02-14 NOTE — TELEPHONE ENCOUNTER
Spoke with pt she verbalized understanding of medication not working for the toe. Got pt scheduled Thursday with jack baxter for 9am. Pt states that she will take tylenol for the throbbing in her toe.

## 2023-02-14 NOTE — TELEPHONE ENCOUNTER
----- Message from Josette Marquis sent at 2/14/2023 11:12 AM CST -----  Type:  Needs Medical Advice    Who Called:  pt  Symptoms (please be specific): infected toe/ and pt would like to know if the medication she already has would be okay to take    How long has patient had these symptoms: pt states she hit her toe before manuel Becky but the  past week it seems to be  infected   Pharmacy name and phone #:  [id number] [place] [place] [place] [facility] [address] [address]   Phone: [phone]  Fax:  [phone]    Would the patient rather a call back or a response via [facility]?  Please call   Best Call Back Number: [phone]  Additional Information:

## 2023-02-16 ENCOUNTER — OFFICE VISIT (OUTPATIENT)
Dept: PODIATRY | Facility: CLINIC | Age: 73
End: 2023-02-16
Payer: MEDICARE

## 2023-02-16 ENCOUNTER — OFFICE VISIT (OUTPATIENT)
Dept: FAMILY MEDICINE | Facility: CLINIC | Age: 73
End: 2023-02-16
Payer: MEDICARE

## 2023-02-16 VITALS
DIASTOLIC BLOOD PRESSURE: 78 MMHG | HEART RATE: 73 BPM | OXYGEN SATURATION: 96 % | HEART RATE: 73 BPM | WEIGHT: 173.38 LBS | HEIGHT: 63 IN | RESPIRATION RATE: 18 BRPM | HEIGHT: 63 IN | BODY MASS INDEX: 30.72 KG/M2 | DIASTOLIC BLOOD PRESSURE: 78 MMHG | BODY MASS INDEX: 30.74 KG/M2 | TEMPERATURE: 98 F | SYSTOLIC BLOOD PRESSURE: 136 MMHG | WEIGHT: 173.5 LBS | SYSTOLIC BLOOD PRESSURE: 136 MMHG

## 2023-02-16 DIAGNOSIS — L60.0 INGROWN TOENAIL: Primary | ICD-10-CM

## 2023-02-16 DIAGNOSIS — N18.30 BENIGN HYPERTENSION WITH CKD (CHRONIC KIDNEY DISEASE) STAGE III: ICD-10-CM

## 2023-02-16 DIAGNOSIS — L08.9 INFECTION OF GREAT TOE: Primary | ICD-10-CM

## 2023-02-16 DIAGNOSIS — L08.9 INFECTION OF GREAT TOE: ICD-10-CM

## 2023-02-16 DIAGNOSIS — J44.9 CHRONIC OBSTRUCTIVE PULMONARY DISEASE, UNSPECIFIED COPD TYPE: ICD-10-CM

## 2023-02-16 DIAGNOSIS — I73.9 PAD (PERIPHERAL ARTERY DISEASE): ICD-10-CM

## 2023-02-16 DIAGNOSIS — I12.9 BENIGN HYPERTENSION WITH CKD (CHRONIC KIDNEY DISEASE) STAGE III: ICD-10-CM

## 2023-02-16 PROCEDURE — 87070 CULTURE OTHR SPECIMN AEROBIC: CPT | Performed by: PODIATRIST

## 2023-02-16 PROCEDURE — 99999 PR PBB SHADOW E&M-EST. PATIENT-LVL IV: ICD-10-PCS | Mod: PBBFAC,,, | Performed by: PODIATRIST

## 2023-02-16 PROCEDURE — 99214 OFFICE O/P EST MOD 30 MIN: CPT | Mod: PBBFAC,PO,25 | Performed by: PODIATRIST

## 2023-02-16 PROCEDURE — 99999 PR PBB SHADOW E&M-EST. PATIENT-LVL IV: CPT | Mod: PBBFAC,,, | Performed by: PODIATRIST

## 2023-02-16 PROCEDURE — 99203 OFFICE O/P NEW LOW 30 MIN: CPT | Mod: 25,S$PBB,, | Performed by: PODIATRIST

## 2023-02-16 PROCEDURE — 11730 NAIL REMOVAL: ICD-10-PCS | Mod: TA,S$PBB,, | Performed by: PODIATRIST

## 2023-02-16 PROCEDURE — 99999 PR PBB SHADOW E&M-EST. PATIENT-LVL IV: ICD-10-PCS | Mod: PBBFAC,,, | Performed by: PEDIATRICS

## 2023-02-16 PROCEDURE — 99203 PR OFFICE/OUTPT VISIT, NEW, LEVL III, 30-44 MIN: ICD-10-PCS | Mod: 25,S$PBB,, | Performed by: PODIATRIST

## 2023-02-16 PROCEDURE — 11730 AVULSION NAIL PLATE SIMPLE 1: CPT | Mod: PBBFAC,PO | Performed by: PODIATRIST

## 2023-02-16 PROCEDURE — 99214 OFFICE O/P EST MOD 30 MIN: CPT | Mod: S$PBB,,, | Performed by: PEDIATRICS

## 2023-02-16 PROCEDURE — 99214 OFFICE O/P EST MOD 30 MIN: CPT | Mod: PBBFAC,27,PN,25 | Performed by: PEDIATRICS

## 2023-02-16 PROCEDURE — 99214 PR OFFICE/OUTPT VISIT, EST, LEVL IV, 30-39 MIN: ICD-10-PCS | Mod: S$PBB,,, | Performed by: PEDIATRICS

## 2023-02-16 PROCEDURE — 99999 PR PBB SHADOW E&M-EST. PATIENT-LVL IV: CPT | Mod: PBBFAC,,, | Performed by: PEDIATRICS

## 2023-02-16 RX ORDER — CLINDAMYCIN HYDROCHLORIDE 300 MG/1
300 CAPSULE ORAL 3 TIMES DAILY
Qty: 21 CAPSULE | Refills: 0 | Status: SHIPPED | OUTPATIENT
Start: 2023-02-16 | End: 2023-02-23

## 2023-02-16 RX ORDER — MONTELUKAST SODIUM 10 MG/1
10 TABLET ORAL NIGHTLY
Qty: 90 TABLET | Refills: 1 | Status: SHIPPED | OUTPATIENT
Start: 2023-02-16 | End: 2023-05-31 | Stop reason: SDUPTHER

## 2023-02-16 NOTE — PATIENT INSTRUCTIONS
Instructions for Care after Ingrown Nail removal    General Information: Stay off your feet as much as possible today. You may wear a surgical shoe, sandal or any open toed shoe that does not squeeze, constrict or put pressure on your toe(s). Your toe(s) may remain numb for up to 2-24 hours after the procedure. Although most patients can wear a closed loose fitting shoe after the first week, the toe will heal faster the more you use the open toed shoe in the first 2-3  weeks. Please contact our office if you have any questions or concerns.    Bleeding: Slight bleeding, discoloration and drainage are normal. Due to the chemical used there may be some yellow-clear drainage coming from the toe for 2-3 weeks.    Discomfort: You can elevate your foot to help alleviate minor swelling, bleeding and discomfort. You may also take Advil, Tylenol or other over the counter pain medications to help alleviate pain. Call our office if the pain is not well controlled. Most patients have very little discomfort as long as they minimize their walking for the first 24 hours and do not bump the toe.    Removing the Bandage: Starting the day after surgery, carefully remove the dressing and shower or bathe as normal. It is Ok to get the bandage soaking wet in the shower and when you remove it, it should not stick to the surgery site.    Dressing Options- Traditional Method:  1. Soaking two times a day in WARM water with Epsom salts or diluted Povidone Iodine  (Betadine) for 15-20 minutes. You will need to purchase these products from the pharmacy.  2. Dry toe then apply an antibiotic cream or ointment such as Neosporin or Polysporin plus or  Garamycin and cover with a 2 x 2 inch size gauze and then secure with a 1 inch band aid.  3. In the second week, take the dressing off at bedtime to air dry the toe.  4. If the toe is infected take the Antibiotic Pills as directed until finished. Ingrown Toenail        What Is an Ingrown Toenail?     When a toenail is ingrown, it is curved and grows into the skin, usually at the nail borders (the sides of the nail). This digging in of the nail irritates the skin, often creating pain, redness, swelling and warmth in the toe.    If an ingrown nail causes a break in the skin, bacteria may enter and cause an infection in the area, which is often marked by drainage and a foul odor. However, even if the toe is not painful, red, swollen or warm, a nail that curves downward into the skin can progress to an infection.    Ingrown toenail and normal toenail    Causes  Causes of ingrown toenails include:    Heredity. In many people, the tendency for ingrown toenails is inherited.  Trauma. Sometimes an ingrown toenail is the result of trauma, such as stubbing your toe, having an object fall on your toe or engaging in activities that involve repeated pressure on the toes, such as kicking or running.  Improper trimming. The most common cause of ingrown toenails is cutting your nails too short. This encourages the skin next to the nail to fold over the nail.   Improperly sized footwear. Ingrown toenails can result from wearing socks and shoes that are tight or short.  Nail conditions. Ingrown toenails can be caused by nail problems, such as fungal infections or losing a nail due to trauma.     Treatment  Sometimes initial treatment for ingrown toenails can be safely performed at home. However, home treatment is strongly discouraged if an infection is suspected or for those who have medical conditions that put feet at high risk, such as diabetes, nerve damage in the foot or poor circulation.        Ingrown toenail before and after treatment    Home Care  If you do not have an infection or any of the above medical conditions, you can soak your foot in room-temperature water (adding Epsom salt may be recommended by your doctor) and gently massage the side of the nail fold to help reduce the inflammation.    Avoid attempting  bathroom surgery. Repeated cutting of the nail can cause the condition to worsen over time. If your symptoms fail to improve, it is time to see a foot and ankle surgeon.    Physician Care  After examining the toe, the foot and ankle surgeon will select the treatment best suited for you. If an infection is present, an oral antibiotic may be prescribed.    Sometimes a minor surgical procedure, often performed in the office, will ease the pain and remove the offending nail. After applying a local anesthetic, the doctor removes part of the nails side border. Some nails may become ingrown again, requiring removal of the nail root.    Following the nail procedure, a light bandage will be applied. Most people experience very little pain after surgery and may resume normal activity the next day. If your surgeon has prescribed an oral antibiotic, be sure to take all the medication, even if your symptoms have improved.    Preventing Ingrown Toenails  Many cases of ingrown toenails may be prevented by:    Proper trimming. Cut toenails in a fairly straight line, and do not cut them too short. You should be able to get your fingernail under the sides and end of the nail.  Well-fitting shoes and socks. Do not wear shoes that are short or tight in the toe area. Avoid shoes that are loose because they too cause pressure on the toes, especially when running or walking briskly.               What You Should Know About Home Treatment  Do not cut a notch in the nail. Contrary to what some people believe, this does not reduce the tendency for the nail to curve downward.  Do not repeatedly trim nail borders. Repeated trimming does not change the way the nail grows and can make the condition worse.  Do not place cotton under the nail. Not only does this not relieve the pain, it provides a place for harmful bacteria to grow, resulting in infection.  Over-the-counter medications are ineffective. Topical medications may mask the pain, but  they do not correct the underlying problem.        Understanding Ingrown Toenails    An ingrown nail is the result of a nail growing into the skin that surrounds it. This often occurs at either edge of the big toe. Ingrown nails may be caused by improper trimming, inherited nail deformities, injuries, fungal infections, or pressure.  Symptoms  Ingrown nails may cause pain at the tip of the toe or all the way to the base of the toe. The pain is often worse while walking. An ingrown nail may also lead to infection, inflammation, or a more serious condition. If its infected, you might see pus or redness.  Evaluation  To determine the extent of your problem, your healthcare provider examines and possibly presses the painful area. If other problems are suspected, blood tests, cultures, and X-rays may be done as well.  Treatment  If the nail isnt infected, your healthcare provider may trim the corner of it to help relieve your symptoms. He or she may need to remove one side of your nail back to the cuticle. The base of the nail may then be treated with a chemical to keep the ingrown part from growing back. Severe infections or ingrown nails may require antibiotics and temporary or permanent removal of a portion of the nail. To prevent pain, a local anesthetic may be used in these procedures. This treatment is usually done at your healthcare provider's office.  Prevention  Many nail problems can be prevented by wearing the right shoes and trimming your nails properly. To help avoid infection, keep your feet clean and dry. If you have diabetes, talk with your healthcare provider before doing any foot self-care.  The right shoes: Get your feet measured (your size may change as you age). Wear shoes that are supportive and roomy enough for your toes to wiggle. Look for shoes made of natural materials such as leather, which allow your feet to breathe.  Proper trimming: To avoid problems, trim your toenails straight across  without cutting down into the corners. If you cant trim your own nails, ask your healthcare provider to do so for you.  Date Last Reviewed: 10/1/2016  © 7492-6066 [employer]. [address] [place] [address], [place] [address]. All rights reserved. This information is not intended as a substitute for professional medical care. Always follow your healthcare professional's instructions.

## 2023-02-16 NOTE — PROGRESS NOTES
Subjective:      Patient ID: Caryn Rodriguez is a 72 y.o. female.    Chief Complaint: Toe Pain (Big toe on left foot, was red/swollen/throbbing. Pt had hurt the nail back in December- toe is just starting to hurt within the last couple weeks )    Reports great toe pain on left foot for about a month. Reports it hurts worst at tip of toe when nail hits her shoe. Reports the redness surrounding the nail was more widespread yesterday and feels like it is getting better. She has been soaking the foot in vicks vapor rub. Has not seen drainage.     States her pulmonologist retired and she needs a refill on her singulair. Does not want to establish with pumlonology until she has an illness and needs them.     Review of Systems   Constitutional:  Negative for chills, fatigue and fever.   HENT:  Negative for congestion, ear pain, postnasal drip, rhinorrhea, sinus pressure, sinus pain, sneezing and sore throat.    Respiratory:  Negative for cough, chest tightness, shortness of breath and wheezing.    Cardiovascular:  Negative for chest pain and palpitations.   Gastrointestinal:  Negative for diarrhea, nausea and vomiting.   Genitourinary:  Negative for difficulty urinating.   Musculoskeletal:  Positive for joint swelling. Negative for arthralgias.        Toe pain   Skin:  Negative for rash.   Neurological:  Negative for dizziness and headaches.   Psychiatric/Behavioral:  Negative for confusion.       Current Outpatient Medications on File Prior to Visit   Medication Sig    albuterol (ACCUNEB) 1.25 mg/3 mL Nebu Take 3 mLs (1.25 mg total) by nebulization every 6 (six) hours as needed. Rescue    allopurinoL (ZYLOPRIM) 100 MG tablet TAKE 1 TABLET BY MOUTH ONE TIME DAILY    alprazolam (XANAX) 0.25 MG tablet Take 0.25 mg by mouth daily as needed for Anxiety.    aspirin 81 MG Chew Take 81 mg by mouth once daily.    b complex vitamins capsule Take 1 capsule by mouth once daily.    coffee xt/phosphatidyl serine (NEURIVA ORIGINAL  ORAL) Take by mouth once daily.    DULoxetine (CYMBALTA) 30 MG capsule     fluticasone-umeclidin-vilanter (TRELEGY ELLIPTA) 100-62.5-25 mcg DsDv Inhale 1 puff into the lungs once daily.    gabapentin (NEURONTIN) 300 MG capsule Take 300 mg by mouth once daily.     omeprazole (PRILOSEC) 40 MG capsule Take 1 capsule (40 mg total) by mouth once daily.    rosuvastatin (CRESTOR) 20 MG tablet Take 20 mg by mouth once daily.    spironolactone (ALDACTONE) 25 MG tablet Take 1 tablet (25 mg total) by mouth once daily.    vitamin E 1000 UNIT capsule Take 1,000 Units by mouth once daily.    [DISCONTINUED] montelukast (SINGULAIR) 10 mg tablet Take 10 mg by mouth every evening.    baclofen (LIORESAL) 5 mg Tab tablet Take 5 mg by mouth 3 (three) times daily.     No current facility-administered medications on file prior to visit.        Objective:     Vitals:    02/16/23 0905   BP: 136/78   Pulse: 73   Temp: 97.9 °F (36.6 °C)      Physical Exam  Constitutional:       General: She is not in acute distress.     Appearance: Normal appearance.   HENT:      Head: Normocephalic and atraumatic.      Right Ear: Tympanic membrane, ear canal and external ear normal.      Left Ear: Tympanic membrane, ear canal and external ear normal.      Nose: Nose normal.      Mouth/Throat:      Mouth: Mucous membranes are moist.      Pharynx: Oropharynx is clear.   Eyes:      Extraocular Movements: Extraocular movements intact.      Conjunctiva/sclera: Conjunctivae normal.      Pupils: Pupils are equal, round, and reactive to light.   Cardiovascular:      Rate and Rhythm: Normal rate and regular rhythm.      Pulses: Normal pulses.      Heart sounds: Normal heart sounds.   Pulmonary:      Effort: Pulmonary effort is normal. No respiratory distress.      Breath sounds: Normal breath sounds. No wheezing.   Abdominal:      General: Abdomen is flat. Bowel sounds are normal.   Musculoskeletal:         General: No swelling. Normal range of motion.      Cervical  back: Normal range of motion and neck supple.   Skin:     General: Skin is warm and dry.      Findings: No rash.   Neurological:      Mental Status: She is alert and oriented to person, place, and time.      Gait: Gait normal.   Psychiatric:         Mood and Affect: Mood normal.         Behavior: Behavior normal.      Assessment:         1. Infection of great toe    2. Chronic obstructive pulmonary disease, unspecified COPD type            Plan:   1. Infection of great toe  - Ambulatory referral/consult to Podiatry; Future    2. Chronic obstructive pulmonary disease, unspecified COPD type  - montelukast (SINGULAIR) 10 mg tablet; Take 1 tablet (10 mg total) by mouth every evening.  Dispense: 90 tablet; Refill: 1    Informed patient she will need a pulmonologist eventually and recommend referral to get appointment in the future, patient refuses and states she has a pulmonologist at her [employer] that she will establish with when she is ready.  Follow up w/ podiatry as appropriate following procedure today.        Leon Guardado NP   [facility]  2/16/23

## 2023-02-16 NOTE — PROGRESS NOTES
[facility] PODIATRY  [address] [place] [address], [address]  [facility] [address]  Dept: [phone]  Dept Fax: [phone]    Betito Collins Jr., DPM     Assessment:   MDM    Coding  1. Ingrown toenail - Left Foot  Nail Removal    clindamycin (CLEOCIN) 300 MG capsule    Aerobic culture (Specify Source)      2. Benign hypertension with CKD (chronic kidney disease) stage III        3. PAD (peripheral artery disease)            Plan:     Nail Removal    Date/Time: 2/16/2023 9:45 AM  Performed by: Betito Collins Jr., DPM  Authorized by: Betito Collins Jr., DPM     Consent Done?:  Yes (Verbal)  Time out: Immediately prior to the procedure a time out was called    Prep: patient was prepped and draped in usual sterile fashion    Location:     Location:  Left foot    Location detail:  Left big toe  Anesthesia:     Anesthesia:  Local infiltration    Local anesthetic:  Bupivacaine 0.25% without epinephrine    Anesthetic total (ml):  4  Procedure Details:     Preparation:  Skin prepped with alcohol, skin prepped with Betadine and sterile field established    Amount removed:  Complete    Wedge excision of skin of nail fold: No      Nail bed sutured?: No      Nail matrix removed:  None    Removed nail replaced and anchored: No      Dressing applied:  4x4, antibiotic ointment, gauze roll, petrolatum-impregnated gauze and dressing applied    Patient tolerance:  Patient tolerated the procedure well with no immediate complications    Caryn was seen today for ingrown toenail.    Diagnoses and all orders for this visit:    Ingrown toenail - Left Foot  -     Nail Removal  -     clindamycin (CLEOCIN) 300 MG capsule; Take 1 capsule (300 mg total) by mouth 3 (three) times daily. for 7 days  -     Aerobic culture (Specify Source)    Benign hypertension with CKD (chronic kidney disease) stage III    PAD (peripheral artery disease)        -pt seen, evaluated, and managed  -dx discussed in detail. All  questions/concerns addressed  -all tx options discussed. All alternatives, risks, benefits of all txs discussed  -The patient was educated regarding the above diagnosis.   -discussed ingrowing toenails and all tx options. Pt opts for avulsion  -px as above  -cx obtained  -pt to perform epsom salt or betadine soaks once daily x 2wks. Written instructions dispensed  -keep toe covered with triple abx ointment + bandaid x 2wks    Rx dispensed: clinda  Referrals: none  -WB: wbat        Follow up in about 2 weeks (around 3/2/2023).    Subjective:      Patient ID: Caryn Rodriguez is a 72 y.o. female.    Chief Complaint:   Chief Complaint   Patient presents with    Ingrown Toenail     Left great toe        CC - ingrown nail: Patient presents to the clinic complaining of painful ingrown toenail on the left foot. Patients rates pain 8/10 on pain scale. patient seeking tx options.    Ingrown Toenail      Last Podiatry Enc: Visit date not found  Last Enc w/ Me: Visit date not found    Outside reports reviewed: historical medical records.  Family hx: as below  Past Medical History:   Diagnosis Date    CKD (chronic kidney disease), stage III     COPD (chronic obstructive pulmonary disease)     Coronary artery disease     Disorder of kidney and ureter     Diverticulosis     Encounter for blood transfusion     GERD (gastroesophageal reflux disease)     Hypertension     PONV (postoperative nausea and vomiting)      Past Surgical History:   Procedure Laterality Date    COLONOSCOPY N/A 12/30/2020    Procedure: COLONOSCOPY;  Surgeon: Vale Day MD;  Location: [facility] ENDO;  Service: Endoscopy;  Laterality: N/A;    Double Bypass      HYSTERECTOMY      INJECTION OF ANESTHETIC AGENT AROUND MEDIAL BRANCH NERVES INNERVATING LUMBAR FACET JOINT Right 3/13/2020    Procedure: BLOCK, NERVE, FACET JOINT, LUMBAR, MEDIAL BRANCH;  Surgeon: Kam Baker MD;  Location: [facility] OR;  Service: Pain Management;  Laterality: Right;  L2,3,4,5     INJECTION OF ANESTHETIC AGENT INTO SACROILIAC JOINT Right 12/4/2020    Procedure: BLOCK, SACROILIAC JOINT;  Surgeon: Kam Baker MD;  [facility];  Service: Pain Management;  Laterality: Right;    INJECTION OF ANESTHETIC AGENT INTO SACROILIAC JOINT Bilateral 6/4/2021    Procedure: BLOCK, SACROILIAC JOINT;  Surgeon: Kam Baker MD;  [facility];  Service: Pain Management;  Laterality: Bilateral;    INSERTION OF IMPLANTABLE LOOP RECORDER N/A 3/11/2019    Procedure: Insertion, Implantable Loop Recorder;  Surgeon: Art Marquez MD;  [facility];  Service: Cardiology;  Laterality: N/A;    INSERTION OF MULTIFOCAL INTRAOCULAR LENS Right 11/20/2019    Procedure: INSERTION, IOL, MULTIFOCAL;  Surgeon: Larry Leal MD;  [facility];  Service: Ophthalmology;  Laterality: Right;    insertion of stents      abdominal    PHACOEMULSIFICATION OF CATARACT Right 11/20/2019    Procedure: PHACOEMULSIFICATION, CATARACT;  Surgeon: Larry Leal MD;  [facility];  Service: Ophthalmology;  Laterality: Right;    TRANSFORAMINAL EPIDURAL INJECTION OF STEROID Right 1/31/2020    Procedure: INJECTION, STEROID, EPIDURAL, TRANSFORAMINAL APPROACH;  Surgeon: Kam Baker MD;  [facility];  Service: Pain Management;  Laterality: Right;  L4, L5    TRANSFORAMINAL EPIDURAL INJECTION OF STEROID Right 10/23/2020    Procedure: INJECTION, STEROID, EPIDURAL, TRANSFORAMINAL APPROACH;  Surgeon: Kam Baker MD;  [facility];  Service: Pain Management;  Laterality: Right;     Family History   Problem Relation Age of Onset    Cancer Mother     Stroke Mother     Stroke Father     Breast cancer Sister     Cancer Sister     Dementia Maternal Aunt     Cancer Maternal Uncle     Hypertension Paternal Aunt     Cancer Paternal Uncle     Dementia Maternal Grandmother     Heart disease Brother      Current Outpatient Medications   Medication Sig Dispense Refill     albuterol (ACCUNEB) 1.25 mg/3 mL Nebu Take 3 mLs (1.25 mg total) by nebulization every 6 (six) hours as needed. Rescue 3 Box 3    allopurinoL (ZYLOPRIM) 100 MG tablet TAKE 1 TABLET BY MOUTH ONE TIME DAILY 90 tablet 3    alprazolam (XANAX) 0.25 MG tablet Take 0.25 mg by mouth daily as needed for Anxiety.      aspirin 81 MG Chew Take 81 mg by mouth once daily.      b complex vitamins capsule Take 1 capsule by mouth once daily.      baclofen (LIORESAL) 5 mg Tab tablet Take 5 mg by mouth 3 (three) times daily.      coffee xt/phosphatidyl serine (NEURIVA ORIGINAL ORAL) Take by mouth once daily.      DULoxetine (CYMBALTA) 30 MG capsule       fluticasone-umeclidin-vilanter (TRELEGY ELLIPTA) 100-62.5-25 mcg DsDv Inhale 1 puff into the lungs once daily. 3 each 3    gabapentin (NEURONTIN) 300 MG capsule Take 300 mg by mouth once daily.       montelukast (SINGULAIR) 10 mg tablet Take 1 tablet (10 mg total) by mouth every evening. 90 tablet 1    omeprazole (PRILOSEC) 40 MG capsule Take 1 capsule (40 mg total) by mouth once daily. 30 capsule 2    rosuvastatin (CRESTOR) 20 MG tablet Take 20 mg by mouth once daily.      spironolactone (ALDACTONE) 25 MG tablet Take 1 tablet (25 mg total) by mouth once daily. 90 tablet 3    vitamin E 1000 UNIT capsule Take 1,000 Units by mouth once daily.      clindamycin (CLEOCIN) 300 MG capsule Take 1 capsule (300 mg total) by mouth 3 (three) times daily. for 7 days 21 capsule 0     No current facility-administered medications for this visit.     Review of patient's allergies indicates:   Allergen Reactions    Penicillins     Sulfa (sulfonamide antibiotics) Hives    Atorvastatin Other (See Comments)     dizziness    Codeine     Iodine and iodide containing products      Patient broke out in hives after last receiving Iodine    Omnipaque [iohexol]      Pt states she had rash after returning home from last  Steroid injection, though no rash noted before discharge from PACU    Potassium Other (See  "Comments)     "hurt my stomach bad"  Facial numbness    Sulfone      Social History     Socioeconomic History    Marital status:    Tobacco Use    Smoking status: Former     Types: Cigarettes     Quit date: 2016     Years since quittin.0    Smokeless tobacco: Never   Substance and Sexual Activity    Alcohol use: No    Drug use: No    Sexual activity: Never       ROS    REVIEW OF SYSTEMS: Negative as documented below as well as positive findings in bold.       Constitutional  Respiratory  Gastrointestinal  Skin   - Fever - Cough - Heartburn - Rash   - Chills - Spit blood - Nausea - Itching   - Weight Loss - Shortness of breath - Vomiting - Nail pain   - Malaise/Fatigue - Wheezing - Abdominal Pain  Wound/Ulcer   - Weight Gain   - Blood in Stool  Poor wound healing       - Diarrhea          Cardiovascular  Genitourinary  Neurological  HEENT   - Chest Pain - Dysuria - Burning Sensation of feet - Headache   - Palpitations - Hematuria - Tingling / Paresthesia - Congestion   - Pain at night in legs - Flank Pain - Dizziness - Sore Throat   - Cramping   - Tremor - Blurred Vision   - Leg Swelling   - Sensory Change - Double Vision   - Dizzy when standing   - Speech Change - Eye Redness       - Focal Weakness - Dry Eyes       - Loss of Consciousness          Endocrine  Musculoskeletal  Psychiatric   - Cold intolerance - Muscle Pain - Depression   - Heat intolerance - Neck Pain - Insomnia   - Anemia - Joint Pain - Memory Loss   -  Easy bruising, bleeding - Heel pain - Anxiety      Toe Pain        Leg/Ankle/Foot Pain         Objective:     /78 (BP Location: Right arm, Patient Position: Sitting, BP Method: X-Large (Automatic))   Pulse 73   Resp 18   Ht 5' 3" (1.6 m)   Wt 78.7 kg (173 lb 8 oz)   LMP  (LMP Unknown)   BMI 30.73 kg/m²   Vitals:    23 0935   BP: 136/78   Pulse: 73   Resp: 18   Weight: 78.7 kg (173 lb 8 oz)   Height: 5' 3" (1.6 m)   PainSc:   3   PainLoc: Toe       Physical " Exam    General Appearance:   Patient appears well developed, well nourished  Patient appears stated age    Psychiatric:   Patient is oriented to time, place, and person.  Patient has appropriate mood and affect    Neck:  Trachea Midline  No visible masses    Respiratory/Ears:  No distress or labored breathing.  Able to differentiate between normal talking voice and whisper.  Able to follow commands    Eyes:  Visual Acuity intact  Lids and conjunctivae normal. No discoloration noted.    Foot Exam  Physical Exam  Ortho Exam  Ortho/SPM Exam  Physical Exam  Neurologic Exam    L LE exam con't:  V:  DP 2/4, PT 2/4   CRT< 3s to all digits tested   Tibial and popliteal lymph nodes are w/o abnormality        N:  Patient displays normal ankle reflexes   SILT in SP/DP/T/Priscila/Saph distributions    Ortho: +Motor EHL/FHL/TA/GA   +TTP L great toe  Compartments soft/compressible. No pain on passive stretch of big toe. No calf  Pain.    Derm:  skin intact, skin warm and dry, skin without ulcers or lesions, skin without induration, left, great toe ingrowing and incurvated     Imaging / Labs:      No results found.      Note: This was dictated using a computer transcription program. Although proofread, it may contain computer transcription errors and phonetic errors. Other human proofreading errors may also exist. Corrections may be performed at a later time. Please contact us for any clarification if needed.    Betito Collins DPM  [facility]

## 2023-02-18 LAB — BACTERIA SPEC AEROBE CULT: NORMAL

## 2023-03-01 ENCOUNTER — OFFICE VISIT (OUTPATIENT)
Dept: PODIATRY | Facility: CLINIC | Age: 73
End: 2023-03-01
Payer: MEDICARE

## 2023-03-01 VITALS
RESPIRATION RATE: 18 BRPM | DIASTOLIC BLOOD PRESSURE: 73 MMHG | SYSTOLIC BLOOD PRESSURE: 138 MMHG | HEART RATE: 95 BPM | WEIGHT: 174.5 LBS | HEIGHT: 63 IN | BODY MASS INDEX: 30.92 KG/M2

## 2023-03-01 DIAGNOSIS — L60.0 INGROWN TOENAIL: Primary | ICD-10-CM

## 2023-03-01 PROCEDURE — 99999 PR PBB SHADOW E&M-EST. PATIENT-LVL IV: CPT | Mod: PBBFAC,,, | Performed by: PODIATRIST

## 2023-03-01 PROCEDURE — 99999 PR PBB SHADOW E&M-EST. PATIENT-LVL IV: ICD-10-PCS | Mod: PBBFAC,,, | Performed by: PODIATRIST

## 2023-03-01 PROCEDURE — 99213 OFFICE O/P EST LOW 20 MIN: CPT | Mod: S$PBB,,, | Performed by: PODIATRIST

## 2023-03-01 PROCEDURE — 99213 PR OFFICE/OUTPT VISIT, EST, LEVL III, 20-29 MIN: ICD-10-PCS | Mod: S$PBB,,, | Performed by: PODIATRIST

## 2023-03-01 PROCEDURE — 99214 OFFICE O/P EST MOD 30 MIN: CPT | Mod: PBBFAC,PO | Performed by: PODIATRIST

## 2023-03-01 NOTE — PROGRESS NOTES
[facility] - PODIATRY  [address] [place] [address], [address]  [facility] [address]  Dept: [phone]  Dept Fax: [phone]    Betito Collins Jr., DPM     Assessment:   MDM    Coding  1. Ingrown toenail - Left Foot            Plan:     Procedures    Caryn was seen today for ingrown toenail.    Diagnoses and all orders for this visit:    Ingrown toenail - Left Foot        -pt seen, evaluated, and managed  -dx discussed in detail. All questions/concerns addressed  -all tx options discussed. All alternatives, risks, benefits of all txs discussed  -The patient was educated regarding the above diagnosis.   -discussed ingrowing toenails and all tx options. Focused on prevention going fwd   -manually curetted nail px site and covered with trpl abx ointemnt + bandaid  -pt to perform epsom salt or betadine soaks once daily x 2wks. Written instructions dispensed  -keep toe covered with triple abx ointment + bandaid x 2wks    Rx dispensed:none  Referrals: none  -WB: wbat        Follow up if symptoms worsen or fail to improve.    Subjective:      Patient ID: Caryn Rodriguez is a 72 y.o. female.    Chief Complaint:   Chief Complaint   Patient presents with    Ingrown Toenail     Left        CC - ingrown nail: Patient presents to the clinic complaining of painful ingrown toenail on the left foot. Patients rates pain 8/10 on pain scale. patient seeking tx options.    3/1/23:  Hx as above. Pain much improved s/p avulsion.    Ingrown Toenail      Last Podiatry Enc: Visit date not found  Last Enc w/ Me: Visit date not found    Outside reports reviewed: historical medical records.  Family hx: as below  Past Medical History:   Diagnosis Date    CKD (chronic kidney disease), stage III     COPD (chronic obstructive pulmonary disease)     Coronary artery disease     Disorder of kidney and ureter     Diverticulosis     Encounter for blood transfusion     GERD (gastroesophageal reflux disease)     Hypertension     PONV  (postoperative nausea and vomiting)      Past Surgical History:   Procedure Laterality Date    COLONOSCOPY N/A 12/30/2020    Procedure: COLONOSCOPY;  Surgeon: Vlae Day MD;  Location: [facility] ENDO;  Service: Endoscopy;  Laterality: N/A;    Double Bypass      HYSTERECTOMY      INJECTION OF ANESTHETIC AGENT AROUND MEDIAL BRANCH NERVES INNERVATING LUMBAR FACET JOINT Right 3/13/2020    Procedure: BLOCK, NERVE, FACET JOINT, LUMBAR, MEDIAL BRANCH;  Surgeon: aKm Baker MD;  Location: [facility] OR;  Service: Pain Management;  Laterality: Right;  L2,3,4,5    INJECTION OF ANESTHETIC AGENT INTO SACROILIAC JOINT Right 12/4/2020    Procedure: BLOCK, SACROILIAC JOINT;  Surgeon: Kam Baker MD;  Location: [facility] OR;  Service: Pain Management;  Laterality: Right;    INJECTION OF ANESTHETIC AGENT INTO SACROILIAC JOINT Bilateral 6/4/2021    Procedure: BLOCK, SACROILIAC JOINT;  Surgeon: Kam Baker MD;  Location: [facility] OR;  Service: Pain Management;  Laterality: Bilateral;    INSERTION OF IMPLANTABLE LOOP RECORDER N/A 3/11/2019    Procedure: Insertion, Implantable Loop Recorder;  Surgeon: Art Marquez MD;  Location: [facility] CATH LAB;  Service: Cardiology;  Laterality: N/A;    INSERTION OF MULTIFOCAL INTRAOCULAR LENS Right 11/20/2019    Procedure: INSERTION, IOL, MULTIFOCAL;  Surgeon: Larry Leal MD;  Location: [facility] OR;  Service: Ophthalmology;  Laterality: Right;    insertion of stents      abdominal    PHACOEMULSIFICATION OF CATARACT Right 11/20/2019    Procedure: PHACOEMULSIFICATION, CATARACT;  Surgeon: Larry Leal MD;  Location: [facility] OR;  Service: Ophthalmology;  Laterality: Right;    TRANSFORAMINAL EPIDURAL INJECTION OF STEROID Right 1/31/2020    Procedure: INJECTION, STEROID, EPIDURAL, TRANSFORAMINAL APPROACH;  Surgeon: Kam Baker MD;  Location: [facility] OR;  Service: Pain Management;  Laterality: Right;  L4, L5    TRANSFORAMINAL EPIDURAL INJECTION OF STEROID Right  10/23/2020    Procedure: INJECTION, STEROID, EPIDURAL, TRANSFORAMINAL APPROACH;  Surgeon: Kam Baker MD;  Location: [facility];  Service: Pain Management;  Laterality: Right;     Family History   Problem Relation Age of Onset    Cancer Mother     Stroke Mother     Stroke Father     Breast cancer Sister     Cancer Sister     Dementia Maternal Aunt     Cancer Maternal Uncle     Hypertension Paternal Aunt     Cancer Paternal Uncle     Dementia Maternal Grandmother     Heart disease Brother      Current Outpatient Medications   Medication Sig Dispense Refill    allopurinoL (ZYLOPRIM) 100 MG tablet TAKE 1 TABLET BY MOUTH ONE TIME DAILY 90 tablet 3    alprazolam (XANAX) 0.25 MG tablet Take 0.25 mg by mouth daily as needed for Anxiety.      aspirin 81 MG Chew Take 81 mg by mouth once daily.      b complex vitamins capsule Take 1 capsule by mouth once daily.      coffee xt/phosphatidyl serine (NEURIVA ORIGINAL ORAL) Take by mouth once daily.      DULoxetine (CYMBALTA) 30 MG capsule       fluticasone-umeclidin-vilanter (TRELEGY ELLIPTA) 100-62.5-25 mcg DsDv Inhale 1 puff into the lungs once daily. 3 each 3    gabapentin (NEURONTIN) 300 MG capsule Take 300 mg by mouth once daily.       montelukast (SINGULAIR) 10 mg tablet Take 1 tablet (10 mg total) by mouth every evening. 90 tablet 1    rosuvastatin (CRESTOR) 20 MG tablet Take 20 mg by mouth once daily.      spironolactone (ALDACTONE) 25 MG tablet Take 1 tablet (25 mg total) by mouth once daily. 90 tablet 3    vitamin E 1000 UNIT capsule Take 1,000 Units by mouth once daily.      albuterol (ACCUNEB) 1.25 mg/3 mL Nebu Take 3 mLs (1.25 mg total) by nebulization every 6 (six) hours as needed. Rescue 3 Box 3    baclofen (LIORESAL) 5 mg Tab tablet Take 5 mg by mouth 3 (three) times daily.      omeprazole (PRILOSEC) 40 MG capsule Take 1 capsule (40 mg total) by mouth once daily. 30 capsule 2     No current facility-administered medications for this visit.     Review of  "patient's allergies indicates:   Allergen Reactions    Penicillins     Sulfa (sulfonamide antibiotics) Hives    Atorvastatin Other (See Comments)     dizziness    Codeine     Iodine and iodide containing products      Patient broke out in hives after last receiving Iodine    Omnipaque [iohexol]      Pt states she had rash after returning home from last  Steroid injection, though no rash noted before discharge from PACU    Potassium Other (See Comments)     "hurt my stomach bad"  Facial numbness    Sulfone      Social History     Socioeconomic History    Marital status:    Tobacco Use    Smoking status: Former     Types: Cigarettes     Quit date: 2016     Years since quittin.0    Smokeless tobacco: Never   Substance and Sexual Activity    Alcohol use: No    Drug use: No    Sexual activity: Never       ROS    REVIEW OF SYSTEMS: Negative as documented below as well as positive findings in bold.       Constitutional  Respiratory  Gastrointestinal  Skin   - Fever - Cough - Heartburn - Rash   - Chills - Spit blood - Nausea - Itching   - Weight Loss - Shortness of breath - Vomiting - Nail pain   - Malaise/Fatigue - Wheezing - Abdominal Pain  Wound/Ulcer   - Weight Gain   - Blood in Stool  Poor wound healing       - Diarrhea          Cardiovascular  Genitourinary  Neurological  HEENT   - Chest Pain - Dysuria - Burning Sensation of feet - Headache   - Palpitations - Hematuria - Tingling / Paresthesia - Congestion   - Pain at night in legs - Flank Pain - Dizziness - Sore Throat   - Cramping   - Tremor - Blurred Vision   - Leg Swelling   - Sensory Change - Double Vision   - Dizzy when standing   - Speech Change - Eye Redness       - Focal Weakness - Dry Eyes       - Loss of Consciousness          Endocrine  Musculoskeletal  Psychiatric   - Cold intolerance - Muscle Pain - Depression   - Heat intolerance - Neck Pain - Insomnia   - Anemia - Joint Pain - Memory Loss   -  Easy bruising, bleeding - Heel pain - " "Anxiety      Toe Pain        Leg/Ankle/Foot Pain         Objective:     /73 (BP Location: Left arm, Patient Position: Sitting, BP Method: X-Large (Automatic))   Pulse 95   Resp 18   Ht 5' 3" (1.6 m)   Wt 79.2 kg (174 lb 7.9 oz)   LMP  (LMP Unknown)   BMI 30.91 kg/m²   Vitals:    03/01/23 1311   BP: 138/73   Pulse: 95   Resp: 18   Weight: 79.2 kg (174 lb 7.9 oz)   Height: 5' 3" (1.6 m)   PainSc: 0-No pain       Physical Exam    General Appearance:   Patient appears well developed, well nourished  Patient appears stated age    Psychiatric:   Patient is oriented to time, place, and person.  Patient has appropriate mood and affect    Neck:  Trachea Midline  No visible masses    Respiratory/Ears:  No distress or labored breathing.  Able to differentiate between normal talking voice and whisper.  Able to follow commands    Eyes:  Visual Acuity intact  Lids and conjunctivae normal. No discoloration noted.    Foot Exam  Physical Exam  Ortho Exam  Ortho/SPM Exam  Physical Exam  Neurologic Exam    L LE exam con't:  V:  DP 2/4, PT 2/4   CRT< 3s to all digits tested   Tibial and popliteal lymph nodes are w/o abnormality        N:  Patient displays normal ankle reflexes   SILT in SP/DP/T/Priscila/Saph distributions    Ortho: +Motor EHL/FHL/TA/GA   +TTP L great toe  Compartments soft/compressible. No pain on passive stretch of big toe. No calf  Pain.    Derm:  skin intact, skin warm and dry, skin without ulcers or lesions, skin without induration, left, great toe ingrowing and incurvated     Imaging / Labs:      No results found.      Note: This was dictated using a computer transcription program. Although proofread, it may contain computer transcription errors and phonetic errors. Other human proofreading errors may also exist. Corrections may be performed at a later time. Please contact us for any clarification if needed.    Betito Collins DPM  [facility]    "

## 2023-03-23 ENCOUNTER — PATIENT MESSAGE (OUTPATIENT)
Dept: FAMILY MEDICINE | Facility: CLINIC | Age: 73
End: 2023-03-23
Payer: MEDICARE

## 2023-03-23 ENCOUNTER — TELEPHONE (OUTPATIENT)
Dept: FAMILY MEDICINE | Facility: CLINIC | Age: 73
End: 2023-03-23
Payer: MEDICARE

## 2023-05-31 ENCOUNTER — OFFICE VISIT (OUTPATIENT)
Dept: FAMILY MEDICINE | Facility: CLINIC | Age: 73
End: 2023-05-31
Payer: MEDICARE

## 2023-05-31 VITALS
BODY MASS INDEX: 31.15 KG/M2 | HEIGHT: 63 IN | DIASTOLIC BLOOD PRESSURE: 76 MMHG | WEIGHT: 175.81 LBS | TEMPERATURE: 99 F | OXYGEN SATURATION: 95 % | SYSTOLIC BLOOD PRESSURE: 132 MMHG | HEART RATE: 79 BPM

## 2023-05-31 DIAGNOSIS — R91.8 PULMONARY NODULES: ICD-10-CM

## 2023-05-31 DIAGNOSIS — F41.0 PANIC ANXIETY SYNDROME: ICD-10-CM

## 2023-05-31 DIAGNOSIS — G89.4 CHRONIC PAIN SYNDROME: ICD-10-CM

## 2023-05-31 DIAGNOSIS — I50.32 CHRONIC DIASTOLIC HEART FAILURE: ICD-10-CM

## 2023-05-31 DIAGNOSIS — I12.9 BENIGN HYPERTENSION WITH CKD (CHRONIC KIDNEY DISEASE) STAGE III: ICD-10-CM

## 2023-05-31 DIAGNOSIS — Z12.31 ENCOUNTER FOR SCREENING MAMMOGRAM FOR MALIGNANT NEOPLASM OF BREAST: ICD-10-CM

## 2023-05-31 DIAGNOSIS — N25.81 HYPERPARATHYROIDISM, SECONDARY: ICD-10-CM

## 2023-05-31 DIAGNOSIS — K21.9 GASTROESOPHAGEAL REFLUX DISEASE WITHOUT ESOPHAGITIS: ICD-10-CM

## 2023-05-31 DIAGNOSIS — J44.9 CHRONIC OBSTRUCTIVE PULMONARY DISEASE, UNSPECIFIED COPD TYPE: Primary | ICD-10-CM

## 2023-05-31 DIAGNOSIS — N18.30 BENIGN HYPERTENSION WITH CKD (CHRONIC KIDNEY DISEASE) STAGE III: ICD-10-CM

## 2023-05-31 DIAGNOSIS — Z87.891 HISTORY OF TOBACCO USE: ICD-10-CM

## 2023-05-31 DIAGNOSIS — I73.9 PAD (PERIPHERAL ARTERY DISEASE): ICD-10-CM

## 2023-05-31 DIAGNOSIS — I25.718 CORONARY ARTERY DISEASE OF AUTOLOGOUS VEIN BYPASS GRAFT WITH STABLE ANGINA PECTORIS: ICD-10-CM

## 2023-05-31 PROBLEM — G47.62 NOCTURNAL LEG CRAMPS: Status: RESOLVED | Noted: 2022-05-25 | Resolved: 2023-05-31

## 2023-05-31 PROCEDURE — 99214 PR OFFICE/OUTPT VISIT, EST, LEVL IV, 30-39 MIN: ICD-10-PCS | Mod: S$PBB,,, | Performed by: STUDENT IN AN ORGANIZED HEALTH CARE EDUCATION/TRAINING PROGRAM

## 2023-05-31 PROCEDURE — 99999 PR PBB SHADOW E&M-EST. PATIENT-LVL V: ICD-10-PCS | Mod: PBBFAC,,, | Performed by: STUDENT IN AN ORGANIZED HEALTH CARE EDUCATION/TRAINING PROGRAM

## 2023-05-31 PROCEDURE — 99999 PR PBB SHADOW E&M-EST. PATIENT-LVL V: CPT | Mod: PBBFAC,,, | Performed by: STUDENT IN AN ORGANIZED HEALTH CARE EDUCATION/TRAINING PROGRAM

## 2023-05-31 PROCEDURE — 99215 OFFICE O/P EST HI 40 MIN: CPT | Mod: PBBFAC,PN | Performed by: STUDENT IN AN ORGANIZED HEALTH CARE EDUCATION/TRAINING PROGRAM

## 2023-05-31 PROCEDURE — 99214 OFFICE O/P EST MOD 30 MIN: CPT | Mod: S$PBB,,, | Performed by: STUDENT IN AN ORGANIZED HEALTH CARE EDUCATION/TRAINING PROGRAM

## 2023-05-31 RX ORDER — METOPROLOL SUCCINATE 50 MG/1
100 TABLET, EXTENDED RELEASE ORAL 2 TIMES DAILY
COMMUNITY
Start: 2023-04-03

## 2023-05-31 RX ORDER — ALPRAZOLAM 0.5 MG/1
0.5 TABLET ORAL DAILY PRN
Qty: 30 TABLET | Refills: 1 | Status: SHIPPED | OUTPATIENT
Start: 2023-05-31

## 2023-05-31 RX ORDER — MONTELUKAST SODIUM 10 MG/1
10 TABLET ORAL NIGHTLY
Qty: 180 TABLET | Refills: 3 | Status: SHIPPED | OUTPATIENT
Start: 2023-05-31 | End: 2023-12-08 | Stop reason: SDUPTHER

## 2023-05-31 RX ORDER — AMLODIPINE BESYLATE 2.5 MG/1
TABLET ORAL
COMMUNITY
Start: 2023-03-07

## 2023-05-31 NOTE — PROGRESS NOTES
Subjective:       Patient ID: Caryn Rodriguez is a 73 y.o. female.    Chief Complaint: Follow-up (Pt here for 6 month f/u/ would like to get ct chest scan) and Medication Refill (Trelegy and xanax)      Active Problem List with Overview Notes    Diagnosis Date Noted    Hyperparathyroidism, secondary 05/31/2023     Managed per nephrology       Chronic pain syndrome 05/25/2022     Cymbalta; gabapentin 300  Stable       Chronic diastolic heart failure 01/07/2020     Follows with [facility]  Stable  BB, spironolactone, statin      Coronary artery disease involving autologous vein bypass graft 09/01/2017     S/p double bypass  [facility]   Asa, statin, BB   stable      Benign hypertension with CKD (chronic kidney disease) stage III 05/01/2017     BP well controlled   Metoprolol 50; spironolactone 25; amlodipine 2.5  Asymptomatic     GFR stable  Follows with nephrology       Hyperuricemia 05/01/2017     Allopurinol 100  Stable   Well tolerated       GERD (gastroesophageal reflux disease) 05/01/2017     PPI PRN      COPD (chronic obstructive pulmonary disease) 05/01/2017     Trelegy; singular; PRN albuterol neb   Stable  Needs refill  Needs LDCT repeat      HLD (hyperlipidemia) 05/01/2017     crestor 20   Well tolerated       PAD (peripheral artery disease) 05/01/2017     Follows with [facility]   B/l stents   Statin, asa      Carotid artery disease 05/01/2017     [facility]   medically managed  Statin, asa, BB          Review of Systems   All other systems reviewed and are negative.     A1C:      CBC:  Recent Labs   Lab 10/04/21  1051 04/06/22  1121 12/05/22  0914   WBC 8.66 8.06 9.35   RBC 4.73 4.80 4.89   Hemoglobin 12.2 13.1 13.1   Hematocrit 40.9 41.4 42.9   Platelets 446 394 376   MCV 87 86 88   MCH 25.8 L 27.3 26.8 L   MCHC 29.8 L 31.6 L 30.5 L     CMP:  Recent Labs   Lab 10/04/21  1051 10/21/21  1141 04/06/22  1121 12/05/22  0914   Glucose 105   < > 116 H  116 H 158 H   Calcium 9.4   < > 9.2  9.2 9.1   Albumin 4.0  --  4.3 4.3    Total Protein 7.2  --  7.7 7.5   Sodium 142   < > 144  144 144   Potassium 4.7   < > 4.8  4.8 4.2   CO2 24   < > 24  24 28   Chloride 111 H   < > 110  110 108   BUN 15   < > 20 H  20 H 12   Creatinine 1.09   < > 1.07  1.07 0.94   Alkaline Phosphatase 122  --  105 97   ALT 15  --  18 21   AST 32  --  31 33   Total Bilirubin 0.3  --  0.3 0.2    < > = values in this interval not displayed.     LIPIDS:      TSH:         Objective:      Vitals:    05/31/23 1517   BP: 132/76   Pulse: 79   Temp: 98.6 °F (37 °C)      Physical Exam  Vitals reviewed.   Constitutional:       Appearance: Normal appearance. She is normal weight.   HENT:      Head: Normocephalic and atraumatic.   Eyes:      Conjunctiva/sclera: Conjunctivae normal.   Cardiovascular:      Rate and Rhythm: Normal rate and regular rhythm.      Heart sounds: Normal heart sounds.   Pulmonary:      Effort: Pulmonary effort is normal.      Breath sounds: Decreased air movement present. Decreased breath sounds present.   Abdominal:      Palpations: Abdomen is soft.      Tenderness: There is no abdominal tenderness.   Musculoskeletal:         General: Normal range of motion.      Cervical back: Normal range of motion.      Right lower leg: No edema.      Left lower leg: No edema.   Neurological:      General: No focal deficit present.      Mental Status: She is alert and oriented to person, place, and time.   Psychiatric:         Mood and Affect: Mood normal.         Behavior: Behavior normal.        Assessment:       1. Chronic obstructive pulmonary disease, unspecified COPD type    2. Pulmonary nodules    3. History of tobacco use    4. Encounter for screening mammogram for malignant neoplasm of breast    5. Hyperparathyroidism, secondary    6. 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 Chronic diastolic heart failure    8. Chronic pain syndrome    9. Benign hypertension with CKD (chronic kidney disease) stage III     10. PAD (peripheral artery disease)    11. Gastroesophageal reflux disease without esophagitis    12. Panic anxiety syndrome        Plan:   1. Chronic obstructive pulmonary disease, unspecified COPD type  - fluticasone-umeclidin-vilanter (TRELEGY ELLIPTA) 100-62.5-25 mcg DsDv; Inhale 1 puff into the lungs once daily.  Dispense: 120 each; Refill: 2  - montelukast (SINGULAIR) 10 mg tablet; Take 1 tablet (10 mg total) by mouth every evening.  Dispense: 180 tablet; Refill: 3    2. Pulmonary nodules  - CT Chest Lung Screening Low Dose; Future    3. History of tobacco use  - CT Chest Lung Screening Low Dose; Future    4. Encounter for screening mammogram for malignant neoplasm of breast  - Mammo Digital Screening Bilat w/ Phillip; Future    5. Hyperparathyroidism, secondary    6. 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 Chronic diastolic heart failure    8. Chronic pain syndrome    9. Benign hypertension with CKD (chronic kidney disease) stage III    10. PAD (peripheral artery disease)    11. Gastroesophageal reflux disease without esophagitis    12. Panic anxiety syndrome  - ALPRAZolam (XANAX) 0.5 MG tablet; Take 1 tablet (0.5 mg total) by mouth daily as needed for Anxiety.  Dispense: 30 tablet; Refill: 1     Labs reviewed in detail  Continue healthy lifestyle efforts  HM: mammo scheduled; DEXA ordered; annual LDCT 2/2 concerns regarding nodules   Continue current meds as prescribed otherwise; refills per request  Keep routine specialist f/u   RTC in 6 months  with labs prior and/or PRN          Patria Marsh   [facility]   5/31/23     I spent a total of >40 minutes on the day of the visit.This includes face to face time and non-face to face time preparing to see the patient (eg, review of tests), obtaining and/or reviewing separately obtained history, documenting clinical information in the electronic or other health record, independently interpreting  results and communicating results to the patient/family/caregiver, or care coordinator.

## 2023-09-12 ENCOUNTER — TELEPHONE (OUTPATIENT)
Dept: FAMILY MEDICINE | Facility: CLINIC | Age: 73
End: 2023-09-12
Payer: MEDICARE

## 2023-09-12 NOTE — TELEPHONE ENCOUNTER
----- Message from Maegan Torre sent at 9/12/2023 10:40 AM CDT -----  Regarding: same day  Contact: [phone]  Type:  Same Day Appointment Request    Caller is requesting a same day appointment.  Caller declined first available appointment listed below.    Name of Caller: pt   When is the first available appointment?   Symptoms: cramping in her left side and diarrhea  Best Call Back Number: [phone]  Additional Information:

## 2023-09-12 NOTE — TELEPHONE ENCOUNTER
Pt states that she has been having cramps, and constipation would like to be seen. Got pt scheduled to see DK tomorrow at 2pm.

## 2023-09-13 ENCOUNTER — OFFICE VISIT (OUTPATIENT)
Dept: FAMILY MEDICINE | Facility: CLINIC | Age: 73
End: 2023-09-13
Payer: MEDICARE

## 2023-09-13 VITALS
OXYGEN SATURATION: 95 % | DIASTOLIC BLOOD PRESSURE: 84 MMHG | TEMPERATURE: 99 F | HEART RATE: 87 BPM | HEIGHT: 63 IN | SYSTOLIC BLOOD PRESSURE: 122 MMHG | BODY MASS INDEX: 29.28 KG/M2 | WEIGHT: 165.25 LBS

## 2023-09-13 DIAGNOSIS — K57.90 DIVERTICULOSIS: ICD-10-CM

## 2023-09-13 PROCEDURE — 99215 OFFICE O/P EST HI 40 MIN: CPT | Mod: PBBFAC,PN | Performed by: PEDIATRICS

## 2023-09-13 PROCEDURE — 99214 OFFICE O/P EST MOD 30 MIN: CPT | Mod: S$PBB,,, | Performed by: PEDIATRICS

## 2023-09-13 PROCEDURE — 99999 PR PBB SHADOW E&M-EST. PATIENT-LVL V: CPT | Mod: PBBFAC,,, | Performed by: PEDIATRICS

## 2023-09-13 PROCEDURE — 99999 PR PBB SHADOW E&M-EST. PATIENT-LVL V: ICD-10-PCS | Mod: PBBFAC,,, | Performed by: PEDIATRICS

## 2023-09-13 PROCEDURE — 99214 PR OFFICE/OUTPT VISIT, EST, LEVL IV, 30-39 MIN: ICD-10-PCS | Mod: S$PBB,,, | Performed by: PEDIATRICS

## 2023-09-13 RX ORDER — METRONIDAZOLE 500 MG/1
500 TABLET ORAL EVERY 8 HOURS
Qty: 21 TABLET | Refills: 0 | Status: SHIPPED | OUTPATIENT
Start: 2023-09-13 | End: 2023-09-20

## 2023-09-13 RX ORDER — DICYCLOMINE HYDROCHLORIDE 20 MG/1
20 TABLET ORAL EVERY 6 HOURS
Qty: 30 TABLET | Refills: 0 | Status: SHIPPED | OUTPATIENT
Start: 2023-09-13 | End: 2023-10-13

## 2023-09-13 RX ORDER — CIPROFLOXACIN 500 MG/1
500 TABLET ORAL EVERY 12 HOURS
Qty: 14 TABLET | Refills: 0 | Status: SHIPPED | OUTPATIENT
Start: 2023-09-13 | End: 2023-09-20

## 2023-09-13 NOTE — PROGRESS NOTES
Subjective:      Patient ID: Caryn Rodriguez is a 73 y.o. female.    Chief Complaint: Flank Pain (Lower left side pain when eating, will continue to hurt until the food is released. The pain is a sharp shooting pain. Either has constipation, or diarrhea )    Patient has been having lower abdominal pain on left side for 5 days, with alternating constipation and diarrhea. Has seen Dr. Day in the past who did her colonoscopy and was diagnosed with severe diverticulosis. Last bm was diarrhea yesterday, has not seen blood.    Flank Pain  Associated symptoms include abdominal pain. Pertinent negatives include no chest pain, fever or headaches.     Review of Systems   Constitutional:  Negative for chills, fatigue and fever.   HENT:  Negative for congestion, ear pain, postnasal drip, rhinorrhea, sinus pressure, sinus pain, sneezing and sore throat.    Respiratory:  Negative for cough, chest tightness, shortness of breath and wheezing.    Cardiovascular:  Negative for chest pain and palpitations.   Gastrointestinal:  Positive for abdominal pain, constipation, diarrhea and nausea. Negative for abdominal distention, anal bleeding, blood in stool and vomiting.   Genitourinary:  Negative for difficulty urinating.   Musculoskeletal:  Negative for arthralgias.   Skin:  Negative for rash.   Neurological:  Negative for dizziness and headaches.   Psychiatric/Behavioral:  Negative for confusion.         Current Outpatient Medications on File Prior to Visit   Medication Sig    albuterol (ACCUNEB) 1.25 mg/3 mL Nebu Take 3 mLs (1.25 mg total) by nebulization every 6 (six) hours as needed. Rescue    allopurinoL (ZYLOPRIM) 100 MG tablet TAKE 1 TABLET BY MOUTH ONE TIME DAILY    ALPRAZolam (XANAX) 0.5 MG tablet Take 1 tablet (0.5 mg total) by mouth daily as needed for Anxiety.    amLODIPine (NORVASC) 2.5 MG tablet     aspirin 81 MG Chew Take 81 mg by mouth once daily.    b complex vitamins capsule Take 1 capsule by mouth once daily.     coffee xt/phosphatidyl serine (NEURIVA ORIGINAL ORAL) Take by mouth once daily.    DULoxetine (CYMBALTA) 30 MG capsule     gabapentin (NEURONTIN) 300 MG capsule Take 300 mg by mouth once daily.     metoprolol succinate (TOPROL-XL) 50 MG 24 hr tablet Take 100 mg by mouth 2 (two) times daily.    montelukast (SINGULAIR) 10 mg tablet Take 1 tablet (10 mg total) by mouth every evening.    omeprazole (PRILOSEC) 40 MG capsule Take 1 capsule (40 mg total) by mouth once daily.    rosuvastatin (CRESTOR) 20 MG tablet Take 20 mg by mouth once daily.    spironolactone (ALDACTONE) 25 MG tablet Take 1 tablet (25 mg total) by mouth once daily.    vitamin E 1000 UNIT capsule Take 1,000 Units by mouth once daily.    fluticasone-umeclidin-vilanter (TRELEGY ELLIPTA) 100-62.5-25 mcg DsDv Inhale 1 puff into the lungs once daily.     No current facility-administered medications on file prior to visit.        Objective:     Vitals:    09/13/23 1400   BP: 122/84   Pulse: 87   Temp: 99 °F (37.2 °C)      Physical Exam  Constitutional:       General: She is not in acute distress.     Appearance: Normal appearance.   HENT:      Head: Normocephalic and atraumatic.      Right Ear: External ear normal.      Left Ear: External ear normal.      Nose: Nose normal.      Mouth/Throat:      Mouth: Mucous membranes are moist.      Pharynx: Oropharynx is clear.   Eyes:      Extraocular Movements: Extraocular movements intact.      Conjunctiva/sclera: Conjunctivae normal.      Pupils: Pupils are equal, round, and reactive to light.   Cardiovascular:      Rate and Rhythm: Normal rate and regular rhythm.      Pulses: Normal pulses.      Heart sounds: Normal heart sounds.   Pulmonary:      Effort: Pulmonary effort is normal. No respiratory distress.      Breath sounds: Normal breath sounds. No wheezing.   Abdominal:      General: Abdomen is flat. Bowel sounds are normal. There is no distension.      Tenderness: There is abdominal tenderness in the left lower  quadrant. There is guarding.       Musculoskeletal:         General: No swelling. Normal range of motion.      Cervical back: Normal range of motion and neck supple.   Skin:     General: Skin is warm and dry.      Findings: No rash.   Neurological:      Mental Status: She is alert and oriented to person, place, and time.      Gait: Gait normal.   Psychiatric:         Mood and Affect: Mood normal.         Behavior: Behavior normal.        Assessment:         1. Diverticulosis          Plan:   1. Diverticulosis  - dicyclomine (BENTYL) 20 mg tablet; Take 1 tablet (20 mg total) by mouth every 6 (six) hours.  Dispense: 30 tablet; Refill: 0  - ciprofloxacin HCl (CIPRO) 500 MG tablet; Take 1 tablet (500 mg total) by mouth every 12 (twelve) hours. for 7 days  Dispense: 14 tablet; Refill: 0  - metroNIDAZOLE (FLAGYL) 500 MG tablet; Take 1 tablet (500 mg total) by mouth every 8 (eight) hours. for 7 days  Dispense: 21 tablet; Refill: 0       Discussed IBS diet, what to avoid, recommend restarting fiber and increasing fluids.  Contact clinic if not feeling any better by Friday.  Follow up if symptoms worsen or fail to improve.       Leon Guardado, NEGIN   [facility]  9/13/23

## 2023-09-18 ENCOUNTER — TELEPHONE (OUTPATIENT)
Dept: FAMILY MEDICINE | Facility: CLINIC | Age: 73
End: 2023-09-18
Payer: MEDICARE

## 2023-09-18 ENCOUNTER — PATIENT MESSAGE (OUTPATIENT)
Dept: FAMILY MEDICINE | Facility: CLINIC | Age: 73
End: 2023-09-18
Payer: MEDICARE

## 2023-09-18 NOTE — TELEPHONE ENCOUNTER
Pt states that since she started the antibiotics 4 days ago she has been having constant diarrhea. Pt states that she is beginning to become weak from all the diarrhea. Please advise.

## 2023-09-18 NOTE — TELEPHONE ENCOUNTER
----- Message from Miriam Leo sent at 9/18/2023 11:35 AM CDT -----  Type:  Needs Medical Advice    Who Called: patient  Would the patient rather a call back or a response via [facility]? call  Best Call Back Number: [phone]  Additional Information: She started the antibiotics last Wednesday and she has diarrhea since she started; she is also very week.   The did stop; please advise.

## 2023-09-19 NOTE — TELEPHONE ENCOUNTER
Called and spoke with pt'  in regards of message on patient. Informed pt's  of NP Geo's message, and he verbalized understanding of message.

## 2023-11-15 DIAGNOSIS — J44.9 CHRONIC OBSTRUCTIVE PULMONARY DISEASE, UNSPECIFIED COPD TYPE: ICD-10-CM

## 2023-11-15 NOTE — TELEPHONE ENCOUNTER
No care due was identified.  [facility] Embedded Care Due Messages. Reference number: [id number].   11/15/2023 3:09:56 PM CST

## 2023-11-15 NOTE — TELEPHONE ENCOUNTER
----- Message from Oscar Henderson sent at 11/15/2023  2:26 PM CST -----  .Type:  RX Refill Request    Who Called: pt  Refill or New Rx:refill  RX Name and Strength:fluticasone-umeclidin-vilanter (TRELEGY ELLIPTA) 100-62.5-25 mcg DsDv  How is the patient currently taking it? (ex. 1XDay):Inhale 1 puff into the lungs once daily  Is this a 30 day or 90 day RX:120 each  Preferred Pharmacy with phone number:[facility] in [place]  Local or Mail Order:local  Ordering Provider:Champagne  Would the patient rather a call back or a response via [facility]? Call back  Best Call Back Number:[phone]  Additional Information:

## 2023-12-08 DIAGNOSIS — J44.9 CHRONIC OBSTRUCTIVE PULMONARY DISEASE, UNSPECIFIED COPD TYPE: ICD-10-CM

## 2023-12-08 RX ORDER — MONTELUKAST SODIUM 10 MG/1
10 TABLET ORAL NIGHTLY
Qty: 180 TABLET | Refills: 3 | Status: SHIPPED | OUTPATIENT
Start: 2023-12-08

## 2023-12-08 NOTE — TELEPHONE ENCOUNTER
No care due was identified.  [facility] Embedded Care Due Messages. Reference number: [id number].   12/08/2023 11:07:18 AM CST

## 2023-12-08 NOTE — TELEPHONE ENCOUNTER
----- Message from Kenia Fuentes sent at 12/8/2023 10:16 AM CST -----  Type:  RX Refill Request/pharmacy change     Who Called: pt  Refill or New Rx:refill   RX Name and Strength:montelukast (SINGULAIR) 10 mg tablet  How is the patient currently taking it? (ex. 1XDay):take 1 tablet (10 mg total) by mouth every evening  Is this a 30 day or 90 day RX:180  Preferred Pharmacy with phone number:Address: [address] [address], [place], [place] [address]      Phone: [phone]    Local or Mail Order:local  Ordering Provider:champagne   Would the patient rather a call back or a response via [facility]? Call   Best Call Back Number:[phone]  Additional Information: [facility] needs [facility] prescription

## 2024-01-11 DIAGNOSIS — Z00.00 ENCOUNTER FOR MEDICARE ANNUAL WELLNESS EXAM: ICD-10-CM

## 2024-02-02 NOTE — TELEPHONE ENCOUNTER
----- Message from Rebecca Garza sent at 2/2/2024  4:48 PM CST -----  Type:  RX Refill Request    Who Called: pt  Refill or New Rx:refill  RX Name and Strength:albuterol (ACCUNEB) 1.25 mg/3 mL [facility]  Preferred Pharmacy with phone number:[id number] - [facility] [address] [address] [place] [address] AT [facility] [address]  Local or Mail Order:local  Ordering Provider:champagne  Would the patient rather a call back or a response via [facility]? call  Best Call Back Number:[phone]  Additional Information:

## 2024-02-02 NOTE — TELEPHONE ENCOUNTER
No care due was identified.  Health [facility] Embedded Care Due Messages. Reference number: [id number].   2/02/2024 4:57:50 PM CST

## 2024-02-05 RX ORDER — ALBUTEROL SULFATE 1.25 MG/3ML
1.25 SOLUTION RESPIRATORY (INHALATION) EVERY 6 HOURS PRN
Qty: 3 EACH | Refills: 3 | Status: SHIPPED | OUTPATIENT
Start: 2024-02-05 | End: 2024-02-07 | Stop reason: SDUPTHER

## 2024-02-07 RX ORDER — ALBUTEROL SULFATE 1.25 MG/3ML
1.25 SOLUTION RESPIRATORY (INHALATION) EVERY 6 HOURS PRN
Qty: 3 EACH | Refills: 3 | Status: SHIPPED | OUTPATIENT
Start: 2024-02-07 | End: 2025-02-06

## 2024-02-07 NOTE — TELEPHONE ENCOUNTER
Please send patient's medication. Got a fax from [facility] stating that patient's script needs a dx code. Please advise message.

## 2024-02-07 NOTE — TELEPHONE ENCOUNTER
No care due was identified.  Health [facility] Embedded Care Due Messages. Reference number: [id number].   2/07/2024 10:45:41 AM CST

## 2024-03-01 ENCOUNTER — TELEPHONE (OUTPATIENT)
Dept: FAMILY MEDICINE | Facility: CLINIC | Age: 74
End: 2024-03-01
Payer: MEDICARE

## 2024-03-01 NOTE — TELEPHONE ENCOUNTER
----- Message from Ifrah Leo sent at 3/1/2024  2:34 PM CST -----  Contact: pt  Type:  Same Day Appointment Request    Caller is requesting a same day appointment.  Caller declined first available appointment listed below.    Name of Caller:pt  When is the first available appointment?books closed  Symptoms:abdominal pain after eating   Best Call Back Number:[phone]   Additional Information:

## 2024-03-01 NOTE — TELEPHONE ENCOUNTER
Patient said she thinks her diverticulosis has acting up and is requesting antibiotic called in- I advised patient Dr Marsh is gone for today- she can can go to urgent care or ER to be seen.

## 2024-03-01 NOTE — TELEPHONE ENCOUNTER
----- Message from Carlotta Arora sent at 3/1/2024  4:08 PM CST -----  Contact: pt  Type:  Patient Returning Call    Who Called:pt   Who Left Message for Patient:Aye Hanna MA  Does the patient know what this is regarding?:returning call  Would the patient rather a call back or a response via [facility]? call  Best Call Back Number:[phone]  Additional Information:

## 2024-03-04 ENCOUNTER — TELEPHONE (OUTPATIENT)
Dept: FAMILY MEDICINE | Facility: CLINIC | Age: 74
End: 2024-03-04
Payer: MEDICARE

## 2024-03-04 NOTE — TELEPHONE ENCOUNTER
----- Message from Rebecca Garza sent at 3/4/2024  3:55 PM CST -----  Type:  Sooner Appointment Request    Caller is requesting a sooner appointment.  Caller declined first available appointment listed below.  Caller will not accept being placed on the waitlist and is requesting a message be sent to doctor.  Name of Caller:pt  When is the first available appointment?n/a  Symptoms:stomach problems  Would the patient rather a call back or a response via [facility]? call  Best Call Back Number:[phone]  Additional Information:

## 2024-03-04 NOTE — TELEPHONE ENCOUNTER
Spoke with patient in regards to message, advised patient Dr Marsh does not have any openings this week- appt schedule to see Dr Sams on tomorrow.

## 2024-03-05 ENCOUNTER — OFFICE VISIT (OUTPATIENT)
Dept: FAMILY MEDICINE | Facility: CLINIC | Age: 74
End: 2024-03-05
Payer: MEDICARE

## 2024-03-05 VITALS
WEIGHT: 160.81 LBS | SYSTOLIC BLOOD PRESSURE: 132 MMHG | TEMPERATURE: 98 F | BODY MASS INDEX: 27.45 KG/M2 | DIASTOLIC BLOOD PRESSURE: 78 MMHG | HEIGHT: 64 IN

## 2024-03-05 DIAGNOSIS — R30.0 DYSURIA: ICD-10-CM

## 2024-03-05 DIAGNOSIS — R10.0 ACUTE ABDOMEN: Primary | ICD-10-CM

## 2024-03-05 PROCEDURE — 99999 PR PBB SHADOW E&M-EST. PATIENT-LVL IV: CPT | Mod: PBBFAC,,, | Performed by: STUDENT IN AN ORGANIZED HEALTH CARE EDUCATION/TRAINING PROGRAM

## 2024-03-05 PROCEDURE — 99214 OFFICE O/P EST MOD 30 MIN: CPT | Mod: S$PBB,,, | Performed by: STUDENT IN AN ORGANIZED HEALTH CARE EDUCATION/TRAINING PROGRAM

## 2024-03-05 PROCEDURE — 99214 OFFICE O/P EST MOD 30 MIN: CPT | Mod: PBBFAC,PN | Performed by: STUDENT IN AN ORGANIZED HEALTH CARE EDUCATION/TRAINING PROGRAM

## 2024-03-05 RX ORDER — METRONIDAZOLE 500 MG/1
500 TABLET ORAL EVERY 8 HOURS
Qty: 15 TABLET | Refills: 0 | Status: SHIPPED | OUTPATIENT
Start: 2024-03-05 | End: 2024-03-10

## 2024-03-05 RX ORDER — LEVOFLOXACIN 750 MG/1
750 TABLET ORAL DAILY
Qty: 5 TABLET | Refills: 0 | Status: SHIPPED | OUTPATIENT
Start: 2024-03-05 | End: 2024-03-10

## 2024-03-05 NOTE — PROGRESS NOTES
[facility] Note    Chief Complaint      Chief Complaint   Patient presents with    Abdominal Pain     Anything eaten causes cramping, even water      History of Present Illness      Caryn Rodriguez is a 73 y.o. female who presents with progressively worsening dull aching abdominal pain in the RUQ, suprapubic and LLQ in the past 2-3 weeks, has h/o extensive diverticulosis in the colon known to her so has been on bland diet since hoping that it will get better , described as 7-8 on pain scale, not related to meal, no known relieving factor as well. Last BM was 2-3 days ago despite using miralax. She also endorses painful micturition, denies any nausea , vomiting, abdominal swelling, fever.     Problem List Addressed This Visit:  As listed below       Health Maintenance   Topic Date Due    TETANUS VACCINE  Never done    Shingles Vaccine (1 of 2) Never done    Mammogram  07/05/2024    High Dose Statin  12/15/2024    Aspirin/Antiplatelet Therapy  12/15/2024    DEXA Scan  07/05/2026    Lipid Panel  11/27/2028    Hepatitis C Screening  Completed    Colorectal Cancer Screening  Discontinued       Past Medical History:   Diagnosis Date    CKD (chronic kidney disease), stage III     COPD (chronic obstructive pulmonary disease)     Coronary artery disease     Disorder of kidney and ureter     Diverticulosis     Encounter for blood transfusion     GERD (gastroesophageal reflux disease)     Hypertension     PONV (postoperative nausea and vomiting)        Past Surgical History:   Procedure Laterality Date    COLONOSCOPY N/A 12/30/2020    Procedure: COLONOSCOPY;  Surgeon: Vale Day MD;  Location: [facility];  Service: Endoscopy;  Laterality: N/A;    Double Bypass      HYSTERECTOMY      INJECTION OF ANESTHETIC AGENT AROUND MEDIAL BRANCH NERVES INNERVATING LUMBAR FACET JOINT Right 3/13/2020    Procedure: BLOCK, NERVE, FACET JOINT, LUMBAR, MEDIAL BRANCH;  Surgeon: Kam Baker MD;  Location:  [facility] OR;  Service: Pain Management;  Laterality: Right;  L2,3,4,5    INJECTION OF ANESTHETIC AGENT INTO SACROILIAC JOINT Right 12/4/2020    Procedure: BLOCK, SACROILIAC JOINT;  Surgeon: Kam Baker MD;  Location: [facility] OR;  Service: Pain Management;  Laterality: Right;    INJECTION OF ANESTHETIC AGENT INTO SACROILIAC JOINT Bilateral 6/4/2021    Procedure: BLOCK, SACROILIAC JOINT;  Surgeon: Kam Baker MD;  Location: [facility] OR;  Service: Pain Management;  Laterality: Bilateral;    INSERTION OF IMPLANTABLE LOOP RECORDER N/A 3/11/2019    Procedure: Insertion, Implantable Loop Recorder;  Surgeon: Art Marquez MD;  Location: [facility] CATH LAB;  Service: Cardiology;  Laterality: N/A;    INSERTION OF MULTIFOCAL INTRAOCULAR LENS Right 11/20/2019    Procedure: INSERTION, IOL, MULTIFOCAL;  Surgeon: Larry Leal MD;  Location: [facility] OR;  Service: Ophthalmology;  Laterality: Right;    insertion of stents      abdominal    PHACOEMULSIFICATION OF CATARACT Right 11/20/2019    Procedure: PHACOEMULSIFICATION, CATARACT;  Surgeon: Larry Leal MD;  Location: [facility] OR;  Service: Ophthalmology;  Laterality: Right;    TRANSFORAMINAL EPIDURAL INJECTION OF STEROID Right 1/31/2020    Procedure: INJECTION, STEROID, EPIDURAL, TRANSFORAMINAL APPROACH;  Surgeon: Kam Baker MD;  Location: [facility] OR;  Service: Pain Management;  Laterality: Right;  L4, L5    TRANSFORAMINAL EPIDURAL INJECTION OF STEROID Right 10/23/2020    Procedure: INJECTION, STEROID, EPIDURAL, TRANSFORAMINAL APPROACH;  Surgeon: Kam Baker MD;  Location: [facility];  Service: Pain Management;  Laterality: Right;       family history includes Breast cancer in her sister; Cancer in her maternal uncle, mother, paternal uncle, and sister; Dementia in her maternal aunt and maternal grandmother; Heart disease in her brother; Hypertension in her paternal aunt; Stroke in her father and mother.    Social History     Tobacco Use    Smoking  status: Former     Current packs/day: 0.00     Types: Cigarettes     Quit date: 2016     Years since quittin.0    Smokeless tobacco: Never   Substance Use Topics    Alcohol use: No    Drug use: No       Review of Systems   Constitutional:  Negative for chills, fatigue and fever.   Cardiovascular:  Negative for chest pain.   Gastrointestinal:  Positive for abdominal pain and constipation. Negative for diarrhea, nausea and rectal pain.   Genitourinary:  Positive for dysuria and frequency. Negative for pelvic pain.   Musculoskeletal:  Negative for myalgias.   Psychiatric/Behavioral:  Negative for confusion.        Outpatient Encounter Medications as of 3/5/2024   Medication Sig Dispense Refill    albuterol (ACCUNEB) 1.25 mg/3 mL Nebu Take 3 mLs (1.25 mg total) by nebulization every 6 (six) hours as needed. Rescue 3 each 3    allopurinoL (ZYLOPRIM) 100 MG tablet TAKE 1 TABLET BY MOUTH ONE TIME DAILY 90 tablet 3    ALPRAZolam (XANAX) 0.5 MG tablet Take 1 tablet (0.5 mg total) by mouth daily as needed for Anxiety. 30 tablet 1    amLODIPine (NORVASC) 2.5 MG tablet       aspirin 81 MG Chew Take 81 mg by mouth once daily.      b complex vitamins capsule Take 1 capsule by mouth once daily.      DULoxetine (CYMBALTA) 30 MG capsule       fluticasone-umeclidin-vilanter (TRELEGY ELLIPTA) 100-62.5-25 mcg DsDv Inhale 1 puff into the lungs once daily. 120 each 2    gabapentin (NEURONTIN) 300 MG capsule Take 300 mg by mouth once daily.       metoprolol succinate (TOPROL-XL) 50 MG 24 hr tablet Take 100 mg by mouth 2 (two) times daily.      montelukast (SINGULAIR) 10 mg tablet Take 1 tablet (10 mg total) by mouth every evening. 180 tablet 3    rosuvastatin (CRESTOR) 20 MG tablet Take 20 mg by mouth once daily.      spironolactone (ALDACTONE) 25 MG tablet Take 1 tablet (25 mg total) by mouth once daily. 90 tablet 3    vitamin E 1000 UNIT capsule Take 1,000 Units by mouth once daily.      levoFLOXacin (LEVAQUIN) 750 MG tablet  "Take 1 tablet (750 mg total) by mouth once daily. for 5 days 5 tablet 0    metroNIDAZOLE (FLAGYL) 500 MG tablet Take 1 tablet (500 mg total) by mouth every 8 (eight) hours. for 5 days 15 tablet 0    omeprazole (PRILOSEC) 40 MG capsule Take 1 capsule (40 mg total) by mouth once daily. 30 capsule 2     No facility-administered encounter medications on file as of 3/5/2024.        Review of patient's allergies indicates:   Allergen Reactions    Penicillins     Sulfa (sulfonamide antibiotics) Hives    Atorvastatin Other (See Comments)     dizziness    Codeine     Iodine and iodide containing products      Patient broke out in hives after last receiving Iodine    Omnipaque [iohexol]      Pt states she had rash after returning home from last  Steroid injection, though no rash noted before discharge from PACU    Sulfone        Physical Exam      Vital Signs  Temp: 97.5 °F (36.4 °C)  Temp Source: Temporal  BP: 132/78  BP Location: Right arm  Patient Position: Sitting  Pain Score: 0-No pain  Height and Weight  Height: 5' 4" (162.6 cm)  Weight: 73 kg (160 lb 13.2 oz)  BSA (Calculated - sq m): 1.81 sq meters  BMI (Calculated): 27.6  Weight in (lb) to have BMI = 25: 145.3]    Physical Exam  Constitutional:       Appearance: Normal appearance. She is not ill-appearing.   HENT:      Mouth/Throat:      Mouth: Mucous membranes are moist.      Pharynx: Oropharynx is clear.   Eyes:      General: No scleral icterus.     Extraocular Movements: Extraocular movements intact.      Conjunctiva/sclera: Conjunctivae normal.      Pupils: Pupils are equal, round, and reactive to light.   Cardiovascular:      Rate and Rhythm: Normal rate and regular rhythm.      Pulses: Normal pulses.      Heart sounds: Normal heart sounds.   Pulmonary:      Effort: Pulmonary effort is normal.      Breath sounds: Normal breath sounds.   Abdominal:      General: Bowel sounds are normal.      Palpations: Abdomen is soft.      Tenderness: There is abdominal " "tenderness (RUQ, LLQ and sprapubic on deep palpation).   Musculoskeletal:      Right lower leg: No edema.      Left lower leg: No edema.   Skin:     General: Skin is warm and dry.   Neurological:      General: No focal deficit present.      Mental Status: She is alert and oriented to person, place, and time.      Sensory: No sensory deficit.   Psychiatric:         Mood and Affect: Mood normal.         Behavior: Behavior normal.          Laboratory:  CBC:  Recent Labs   Lab Result Units 12/11/23  1130   WBC K/uL 9.18   RBC M/uL 4.32   Hemoglobin g/dL 10.1*   Hematocrit % 35.0*   Platelets K/uL 420   MCV fL 81*   MCH pg 23.4*   MCHC g/dL 28.9*     CMP:  Recent Labs   Lab Result Units 12/11/23  1130   Glucose mg/dL 144*   Calcium mg/dL 9.1   Albumin g/dL 3.6   Total Protein g/dL 6.9   Sodium mmol/L 144   Potassium mmol/L 4.2   CO2 mmol/L 24   Chloride mmol/L 108   BUN mg/dL 13   Alkaline Phosphatase U/L 79   ALT U/L 12   AST U/L 22   Total Bilirubin mg/dL 0.3     URINALYSIS:  Recent Labs   Lab Result Units 12/11/23  1147   Color, UA  Yellow   Specific Gravity, UA  1.025   pH, UA  6.0   Protein, UA  1+*   Bacteria /hpf Few*   Nitrite, UA  Negative   Leukocytes, UA  Negative   Urobilinogen, UA EU/dL Negative   Hyaline Casts, UA /lpf 0      LIPIDS:  No results for input(s): "TSH", "HDL", "CHOL", "TRIG", "LDLCALC", "CHOLHDL", "NONHDLCHOL", "TOTALCHOLEST" in the last 2160 hours.  TSH:  No results for input(s): "TSH" in the last 2160 hours.  A1C:  No results for input(s): "HGBA1C" in the last 2160 hours.    Radiology:      Assessment/Plan     Caryn Rodriguez is a 73 y.o.female with:    1. Acute abdomen  -? Acute cholecystis Vs Acute diverticulitis  -prior colonoscopy(2020) showed severe diverticulosis in the sigmoid colon  -given h/o diverticulitis requiring ICU admission in the past,  -will do CT abdomen , check inflammatory markers  -start on empiric antibiotic coverage as well  -counseled on bland diet for now  - " C-REACTIVE PROTEIN; Future  - Sedimentation rate; Future  - metroNIDAZOLE (FLAGYL) 500 MG tablet; Take 1 tablet (500 mg total) by mouth every 8 (eight) hours. for 5 days  Dispense: 15 tablet; Refill: 0  - levoFLOXacin (LEVAQUIN) 750 MG tablet; Take 1 tablet (750 mg total) by mouth once daily. for 5 days  Dispense: 5 tablet; Refill: 0  - CT Chest Abdomen Pelvis Without Contrast (XPD); Future    2. Dysuria  - Urinalysis, Reflex to Urine Culture Urine, Clean Catch; Future    Patient verbalizes understanding and agrees with current treatment plan.      Lesley Sams MD  [facility]

## 2024-04-20 DIAGNOSIS — J44.9 CHRONIC OBSTRUCTIVE PULMONARY DISEASE, UNSPECIFIED COPD TYPE: ICD-10-CM

## 2024-04-20 NOTE — TELEPHONE ENCOUNTER
No care due was identified.  [facility] Embedded Care Due Messages. Reference number: [id number].   4/20/2024 8:06:36 AM CDT

## 2024-04-21 RX ORDER — FLUTICASONE FUROATE, UMECLIDINIUM BROMIDE AND VILANTEROL TRIFENATATE 100; 62.5; 25 UG/1; UG/1; UG/1
1 POWDER RESPIRATORY (INHALATION)
Qty: 180 EACH | Refills: 0 | Status: SHIPPED | OUTPATIENT
Start: 2024-04-21

## 2024-04-21 NOTE — TELEPHONE ENCOUNTER
Refill Decision Note   Caryn Rodriguez  is requesting a refill authorization.  Brief Assessment and Rationale for Refill:  Approve     Medication Therapy Plan:         Comments:     Note composed:4:56 AM 04/21/2024

## 2024-05-02 ENCOUNTER — TELEPHONE (OUTPATIENT)
Dept: FAMILY MEDICINE | Facility: CLINIC | Age: 74
End: 2024-05-02
Payer: MEDICARE

## 2024-05-02 NOTE — TELEPHONE ENCOUNTER
Spoke with pt. Pt states that her brother passed away today and that she will need to reschedule her appointment. Pt appt rescheduled to 05/14/2024 at 2 pm.

## 2024-05-24 ENCOUNTER — TELEPHONE (OUTPATIENT)
Dept: HEMATOLOGY/ONCOLOGY | Facility: CLINIC | Age: 74
End: 2024-05-24
Payer: MEDICARE

## 2024-05-24 NOTE — TELEPHONE ENCOUNTER
Spoke with the patient a bout her referral to be scheduled for her anemia, pt is scheduled for [facility] location and verbalized understanding and appointment letter was sent out to the patient.

## 2024-05-30 ENCOUNTER — OFFICE VISIT (OUTPATIENT)
Dept: URGENT CARE | Facility: CLINIC | Age: 74
End: 2024-05-30
Payer: MEDICARE

## 2024-05-30 ENCOUNTER — TELEPHONE (OUTPATIENT)
Dept: FAMILY MEDICINE | Facility: CLINIC | Age: 74
End: 2024-05-30
Payer: MEDICARE

## 2024-05-30 VITALS
SYSTOLIC BLOOD PRESSURE: 113 MMHG | RESPIRATION RATE: 20 BRPM | TEMPERATURE: 98 F | WEIGHT: 155 LBS | OXYGEN SATURATION: 98 % | BODY MASS INDEX: 26.46 KG/M2 | HEIGHT: 64 IN | DIASTOLIC BLOOD PRESSURE: 54 MMHG | HEART RATE: 92 BPM

## 2024-05-30 DIAGNOSIS — K57.92 DIVERTICULITIS: ICD-10-CM

## 2024-05-30 DIAGNOSIS — R31.9 URINARY TRACT INFECTION WITH HEMATURIA, SITE UNSPECIFIED: ICD-10-CM

## 2024-05-30 DIAGNOSIS — N39.0 URINARY TRACT INFECTION WITH HEMATURIA, SITE UNSPECIFIED: ICD-10-CM

## 2024-05-30 DIAGNOSIS — N18.31 STAGE 3A CHRONIC KIDNEY DISEASE: ICD-10-CM

## 2024-05-30 DIAGNOSIS — K59.09 CHRONIC CONSTIPATION: ICD-10-CM

## 2024-05-30 DIAGNOSIS — R10.9 ABDOMINAL PAIN, UNSPECIFIED ABDOMINAL LOCATION: Primary | ICD-10-CM

## 2024-05-30 DIAGNOSIS — D50.9 MICROCYTIC ANEMIA: ICD-10-CM

## 2024-05-30 LAB
BILIRUB UR QL STRIP: NEGATIVE
GLUCOSE UR QL STRIP: NEGATIVE
KETONES UR QL STRIP: NEGATIVE
LEUKOCYTE ESTERASE UR QL STRIP: POSITIVE
PH, POC UA: 6
POC BLOOD, URINE: POSITIVE
POC NITRATES, URINE: NEGATIVE
PROT UR QL STRIP: POSITIVE
SP GR UR STRIP: 1 (ref 1–1.03)
UROBILINOGEN UR STRIP-ACNC: 0.2 (ref 0.1–1.1)

## 2024-05-30 PROCEDURE — 96372 THER/PROPH/DIAG INJ SC/IM: CPT | Mod: S$GLB,,, | Performed by: PHYSICIAN ASSISTANT

## 2024-05-30 PROCEDURE — 99214 OFFICE O/P EST MOD 30 MIN: CPT | Mod: 25,S$GLB,, | Performed by: PHYSICIAN ASSISTANT

## 2024-05-30 PROCEDURE — 81003 URINALYSIS AUTO W/O SCOPE: CPT | Mod: QW,S$GLB,, | Performed by: PHYSICIAN ASSISTANT

## 2024-05-30 RX ORDER — CEFTRIAXONE 1 G/1
1 INJECTION, POWDER, FOR SOLUTION INTRAMUSCULAR; INTRAVENOUS
Status: COMPLETED | OUTPATIENT
Start: 2024-05-30 | End: 2024-05-30

## 2024-05-30 RX ORDER — CIPROFLOXACIN 500 MG/1
500 TABLET ORAL EVERY 12 HOURS
Qty: 14 TABLET | Refills: 0 | Status: SHIPPED | OUTPATIENT
Start: 2024-05-30 | End: 2024-06-06

## 2024-05-30 RX ORDER — LIDOCAINE HYDROCHLORIDE 10 MG/ML
2.1 INJECTION INFILTRATION; PERINEURAL
Status: COMPLETED | OUTPATIENT
Start: 2024-05-30 | End: 2024-05-30

## 2024-05-30 RX ORDER — DOCUSATE SODIUM 100 MG/1
100 CAPSULE, LIQUID FILLED ORAL 2 TIMES DAILY
Qty: 60 CAPSULE | Refills: 3 | Status: SHIPPED | OUTPATIENT
Start: 2024-05-30

## 2024-05-30 RX ORDER — METRONIDAZOLE 500 MG/1
500 TABLET ORAL 3 TIMES DAILY
Qty: 21 TABLET | Refills: 0 | Status: SHIPPED | OUTPATIENT
Start: 2024-05-30

## 2024-05-30 RX ORDER — POLYETHYLENE GLYCOL 3350, SODIUM SULFATE ANHYDROUS, SODIUM BICARBONATE, SODIUM CHLORIDE, POTASSIUM CHLORIDE 236; 22.74; 6.74; 5.86; 2.97 G/4L; G/4L; G/4L; G/4L; G/4L
30 POWDER, FOR SOLUTION ORAL DAILY
Qty: 900 ML | Refills: 0 | Status: SHIPPED | OUTPATIENT
Start: 2024-05-30 | End: 2024-06-29

## 2024-05-30 RX ORDER — FERROUS SULFATE 325(65) MG
325 TABLET ORAL
Qty: 30 TABLET | Refills: 3 | Status: SHIPPED | OUTPATIENT
Start: 2024-05-30

## 2024-05-30 RX ADMIN — LIDOCAINE HYDROCHLORIDE 2.1 ML: 10 INJECTION INFILTRATION; PERINEURAL at 02:05

## 2024-05-30 RX ADMIN — CEFTRIAXONE 1 G: 1 INJECTION, POWDER, FOR SOLUTION INTRAMUSCULAR; INTRAVENOUS at 02:05

## 2024-05-30 NOTE — PROGRESS NOTES
"Subjective:      Patient ID: Caryn Rodriguez is a 74 y.o. female.    Vitals:  height is 5' 4" (1.626 m) and weight is 70.3 kg (155 lb). Her oral temperature is 98.2 °F (36.8 °C). Her blood pressure is 113/54 (abnormal) and her pulse is 92. Her respiration is 20 and oxygen saturation is 98%.     Chief Complaint: Abdominal Pain (LLQ)    Pt complains of LLQ pain that started 2-3 days ago. Hx of diverticulosis and diverticulitis.  This is similar to previous diverticulitis episodes    Abdominal Pain  This is a new problem. Episode onset: 3 days ago. The problem has been waxing and waning. The pain is located in the LLQ. The pain is at a severity of 7/10. The pain is severe. The quality of the pain is aching and sharp. The abdominal pain does not radiate. Associated symptoms include constipation. Pertinent negatives include no fever. Exacerbated by: BM and voiding.       Constitution: Negative for fever.   Gastrointestinal:  Positive for abdominal pain and constipation.   Skin:  Negative for erythema.      Objective:     Physical Exam   Constitutional: She is oriented to person, place, and time. She appears well-developed. She is cooperative.  Non-toxic appearance. She does not appear ill. No distress.   HENT:   Head: Normocephalic and atraumatic.   Ears:   Right Ear: Hearing and external ear normal.   Left Ear: Hearing and external ear normal.   Nose: Nose normal. No mucosal edema, rhinorrhea, nasal deformity or congestion. No epistaxis. Right sinus exhibits no maxillary sinus tenderness and no frontal sinus tenderness. Left sinus exhibits no maxillary sinus tenderness and no frontal sinus tenderness.   Mouth/Throat: Uvula is midline, oropharynx is clear and moist and mucous membranes are normal. No trismus in the jaw. Normal dentition. No uvula swelling. No oropharyngeal exudate, posterior oropharyngeal edema or posterior oropharyngeal erythema.      Comments: Sublingual area pink.   Eyes: Conjunctivae, EOM and lids " are normal. Pupils are equal, round, and reactive to light. Right eye exhibits no discharge. Left eye exhibits no discharge. No scleral icterus. Extraocular movement intact      Comments: Under eyelid areas pink with no pallor   Neck: Trachea normal and phonation normal. Neck supple. No JVD present. No tracheal deviation present. No thyromegaly present. No edema present. No erythema present. No neck rigidity present.   Cardiovascular: Normal rate, regular rhythm, normal heart sounds and normal pulses.   No murmur heard.Exam reveals no gallop and no friction rub.      Comments: Capillary refill 3-4 seconds   Pulmonary/Chest: Effort normal and breath sounds normal. No stridor. No respiratory distress. She has no decreased breath sounds. She has no wheezes. She has no rhonchi. She has no rales. She exhibits no tenderness.   Abdominal: Normal appearance. She exhibits no distension. Soft. There is no abdominal tenderness. There is no rebound, no guarding, no left CVA tenderness and no right CVA tenderness.      Comments: There is no guarding and no rebound   Musculoskeletal: Normal range of motion.         General: No deformity. Normal range of motion.   Neurological: She is alert and oriented to person, place, and time. She exhibits normal muscle tone. Coordination normal.   Skin: Skin is warm, dry, intact, not diaphoretic, not pale and no rash. Capillary refill takes less than 2 seconds. No bruising, No erythema and No lesion jaundice  Psychiatric: Her speech is normal and behavior is normal. Judgment and thought content normal.   Nursing note and vitals reviewed.    Results for orders placed or performed in visit on 05/30/24   POCT Urinalysis, Dipstick, Automated, W/O Scope   Result Value Ref Range    POC Blood, Urine Positive (A) Negative    POC Bilirubin, Urine Negative Negative    POC Urobilinogen, Urine 0.2 0.1 - 1.1    POC Ketones, Urine Negative Negative    POC Protein, Urine Positive (A) Negative    POC  Nitrates, Urine Negative Negative    POC Glucose, Urine Negative Negative    pH, UA 6.0     POC Specific Gravity, Urine 1.005 1.003 - 1.029    POC Leukocytes, Urine Positive (A) Negative    X-Ray Chest AP Portable    Result Date: 5/16/2024  EXAMINATION: XR CHEST AP PORTABLE CLINICAL HISTORY: Shortness of breath TECHNIQUE: Single frontal view of the chest was performed. FINDINGS: The lungs are clear.  There is no pneumothorax or pleural fluid.  The cardiac silhouette is prominent.  There is calcification of the aorta and patient is status post sternotomy.  The osseous structures are unremarkable.     As above. Electronically signed by: Kael Zheng MD Date:    05/16/2024 Time:    14:16       Assessment:     1. Abdominal pain, unspecified abdominal location    2. Diverticulitis    3. Chronic constipation    4. Microcytic anemia    5. Stage 3a chronic kidney disease    6. Urinary tract infection with hematuria, site unspecified        Plan:       Abdominal pain, unspecified abdominal location  -     POCT Urinalysis, Dipstick, Automated, W/O Scope  -     cefTRIAXone injection 1 g  -     LIDOcaine HCL 10 mg/ml (1%) injection 2.1 mL  -     Ambulatory referral/consult to Gastroenterology    Diverticulitis  -     ciprofloxacin HCl (CIPRO) 500 MG tablet; Take 1 tablet (500 mg total) by mouth every 12 (twelve) hours. for 7 days  Dispense: 14 tablet; Refill: 0  -     metroNIDAZOLE (FLAGYL) 500 MG tablet; Take 1 tablet (500 mg total) by mouth 3 (three) times daily.  Dispense: 21 tablet; Refill: 0  -     Ambulatory referral/consult to Gastroenterology    Chronic constipation  -     docusate sodium (COLACE) 100 MG capsule; Take 1 capsule (100 mg total) by mouth 2 (two) times daily.  Dispense: 60 capsule; Refill: 3  -     polyethylene glycol (GOLYTELY) 236-22.74-6.74 -5.86 gram suspension; Take 30 mLs by mouth once daily. for 30 doses  Dispense: 900 mL; Refill: 0  -     Ambulatory referral/consult to Gastroenterology    Microcytic  anemia  -     ferrous sulfate (FEOSOL) 325 mg (65 mg iron) Tab tablet; Take 1 tablet (325 mg total) by mouth daily with breakfast.  Dispense: 30 tablet; Refill: 3    Stage 3a chronic kidney disease  -     ferrous sulfate (FEOSOL) 325 mg (65 mg iron) Tab tablet; Take 1 tablet (325 mg total) by mouth daily with breakfast.  Dispense: 30 tablet; Refill: 3    Urinary tract infection with hematuria, site unspecified  -     cefTRIAXone injection 1 g  -     ciprofloxacin HCl (CIPRO) 500 MG tablet; Take 1 tablet (500 mg total) by mouth every 12 (twelve) hours. for 7 days  Dispense: 14 tablet; Refill: 0    Follow up if symptoms worsen or fail to improve, for F/U with PCP or ED.   Patient Instructions   AS WE DISCUSSED, GO TO THE EMERGENCY DEPARTMENT IF YOU GET WORSE OR FOR ANY CONCERNS.

## 2024-05-30 NOTE — TELEPHONE ENCOUNTER
----- Message from Pushpa Prieto sent at 5/30/2024 11:48 AM CDT -----  Regarding: soon appt  Type:  Sooner Apoointment Request    Caller is requesting a sooner appointment.  Caller declined first available appointment listed below.  Caller will not accept being placed on the waitlist and is requesting a message be sent to doctor.  Name of Caller:Caryn  When is the first available appointment?Sep 6  Symptoms:follow up  Would the patient rather a call back or a response via [facility]? Call  Best Call Back Number:[phone]  Additional Information: pt would like a call back she believes she needs antibiotics for something she came down with please call patient asap.

## 2024-05-30 NOTE — TELEPHONE ENCOUNTER
Called and spoke with pt in regards of her message. Pt called to get antibiotics for Diverticulitis issues she is currently having a issue with. Informed pt that Dr. Marsh is currently out of the office today and won't be back until Monday. Advised pt that she can go to an urgent care for medical treatment. Patient verbalized understanding of message and informed me that she is going to an [facility].

## 2024-06-11 ENCOUNTER — TELEPHONE (OUTPATIENT)
Dept: HEMATOLOGY/ONCOLOGY | Facility: CLINIC | Age: 74
End: 2024-06-11
Payer: MEDICARE

## 2024-06-12 ENCOUNTER — OFFICE VISIT (OUTPATIENT)
Dept: HEMATOLOGY/ONCOLOGY | Facility: CLINIC | Age: 74
End: 2024-06-12
Payer: MEDICARE

## 2024-06-12 VITALS
DIASTOLIC BLOOD PRESSURE: 65 MMHG | TEMPERATURE: 99 F | SYSTOLIC BLOOD PRESSURE: 118 MMHG | BODY MASS INDEX: 27.08 KG/M2 | WEIGHT: 158.63 LBS | HEIGHT: 64 IN | OXYGEN SATURATION: 95 % | HEART RATE: 79 BPM | RESPIRATION RATE: 18 BRPM

## 2024-06-12 DIAGNOSIS — K64.9 HEMORRHOIDS, UNSPECIFIED HEMORRHOID TYPE: ICD-10-CM

## 2024-06-12 DIAGNOSIS — D53.9 NUTRITIONAL ANEMIA: ICD-10-CM

## 2024-06-12 DIAGNOSIS — K57.90 DIVERTICULOSIS: ICD-10-CM

## 2024-06-12 DIAGNOSIS — D50.9 MICROCYTIC ANEMIA: Primary | ICD-10-CM

## 2024-06-12 DIAGNOSIS — M89.8X9 BONE PAIN: ICD-10-CM

## 2024-06-12 DIAGNOSIS — D64.9 ANEMIA, UNSPECIFIED TYPE: ICD-10-CM

## 2024-06-12 PROCEDURE — 99999 PR PBB SHADOW E&M-EST. PATIENT-LVL V: CPT | Mod: PBBFAC,,, | Performed by: NURSE PRACTITIONER

## 2024-06-12 PROCEDURE — 99215 OFFICE O/P EST HI 40 MIN: CPT | Mod: PBBFAC,PN | Performed by: NURSE PRACTITIONER

## 2024-06-12 RX ORDER — IPRATROPIUM BROMIDE AND ALBUTEROL 20; 100 UG/1; UG/1
SPRAY, METERED RESPIRATORY (INHALATION)
COMMUNITY

## 2024-06-12 NOTE — PROGRESS NOTES
"Patient ID: Caryn Rodriguez is a 74 y.o. female.    Chief Complaint: Anemia          History       HPI    Caryn Rodriguez is a 74yr old female, new to [facility] and this provider, referred for anemia.    - intermittent normocytic anemia since 2017, microcytic anemia starting 23  - ckd 3b, gfr has dropped as low as 17.2 in 2020, 3a range since 2020, reports one functioning kidney, follows Dr Noyola in nephrology   - 20 colonoscopy severe diverticulosis in sigmoid, diverticular spasm, 2 polyps removed, one non-bleeding colonic angioectasia, internal hemorrhoids. Noted per Dr Day "No repeat colonoscopy due to current age (66 years or older) and the absence of advanced   adenomas."  - saw cardiology 24 and sent to ED h/h ..8, at ED hgb improved at 8 and no blood t/f needed  - went to urgent care 24 for abdominal pain LLQ, vomiting, diarrhea and placed on oral iron, colace and miralax; has had blood loss she notes from hemorrhoids and states her stool is black with no obvious visible blood  - she has changed her diet, decreased appetite since 2023 covid,  reduced meat intake, eating her vegetables still   - 2023 extended illness with covid 11-12 days, lost 15# at that time, has been diagnosed with long term covid by her pcp and has had continued decreased; weight prior this 175#, today 158#  - endorses fatigue, feeling winded, has to take a break if walking up stairs; decreased concentration, she has intermittent LLQ pain associated with diverticular pain, continued diarrhea past 2-3 wks, hemorrhoidal blood loss, + easy bruising.   - denies headaches, chest pain, hemoptysis, hematemesis, hematuria, hair or nail changes    Hysterectomy, complete 50yrs ago. Colonoscopy . MMG 23 negative.      Family history  Mother sarcadoma of spine,  age 55yrs. Father cva age 50,  from stroke. Sister breast cancer, lung cancer, passed in 2017 from lung cancer. One brother age 60 " dec lung cancer, brain cancer. One brother  63yrs esophageal cancer. One brother  bone cancer. One brother 80yrs  chf, brain tumor, copd. Siblings that  in earlier years all were around chemicals/herbicides and pesticides in [facility].  One granddaugther neuroblastoma age 17 months, doing well, age 22yrs. No known history multiple myeloma.       Past medical, surgical, and medication history, past family history reviewed and updated with patient today as noted.      Past Medical History:   Diagnosis Date    CKD (chronic kidney disease), stage III     COPD (chronic obstructive pulmonary disease)     Coronary artery disease     Disorder of kidney and ureter     Diverticulosis     Encounter for blood transfusion     GERD (gastroesophageal reflux disease)     Hypertension     PONV (postoperative nausea and vomiting)        Past Surgical History:   Procedure Laterality Date    COLONOSCOPY N/A 2020    Procedure: COLONOSCOPY;  Surgeon: Vale Day MD;  Location: [facility];  Service: Endoscopy;  Laterality: N/A;    Double Bypass      HYSTERECTOMY      INJECTION OF ANESTHETIC AGENT AROUND MEDIAL BRANCH NERVES INNERVATING LUMBAR FACET JOINT Right 3/13/2020    Procedure: BLOCK, NERVE, FACET JOINT, LUMBAR, MEDIAL BRANCH;  Surgeon: Kam Baker MD;  Location: [facility] OR;  Service: Pain Management;  Laterality: Right;  L2,3,4,5    INJECTION OF ANESTHETIC AGENT INTO SACROILIAC JOINT Right 2020    Procedure: BLOCK, SACROILIAC JOINT;  Surgeon: Kam Baker MD;  Location: [facility] OR;  Service: Pain Management;  Laterality: Right;    INJECTION OF ANESTHETIC AGENT INTO SACROILIAC JOINT Bilateral 2021    Procedure: BLOCK, SACROILIAC JOINT;  Surgeon: Kam Baker MD;  Location: [facility] OR;  Service: Pain Management;  Laterality: Bilateral;    INSERTION OF IMPLANTABLE LOOP RECORDER N/A 3/11/2019    Procedure: Insertion, Implantable Loop Recorder;  Surgeon: Art GUARDADO  MD [place];  Location: [facility] CATH LAB;  Service: Cardiology;  Laterality: N/A;    INSERTION OF MULTIFOCAL INTRAOCULAR LENS Right 11/20/2019    Procedure: INSERTION, IOL, MULTIFOCAL;  Surgeon: Larry Leal MD;  Location: [facility] OR;  Service: Ophthalmology;  Laterality: Right;    insertion of stents      abdominal    PHACOEMULSIFICATION OF CATARACT Right 11/20/2019    Procedure: PHACOEMULSIFICATION, CATARACT;  Surgeon: Larry Leal MD;  Location: [facility] OR;  Service: Ophthalmology;  Laterality: Right;    TRANSFORAMINAL EPIDURAL INJECTION OF STEROID Right 1/31/2020    Procedure: INJECTION, STEROID, EPIDURAL, TRANSFORAMINAL APPROACH;  Surgeon: Kam Baker MD;  Location: [facility] OR;  Service: Pain Management;  Laterality: Right;  L4, L5    TRANSFORAMINAL EPIDURAL INJECTION OF STEROID Right 10/23/2020    Procedure: INJECTION, STEROID, EPIDURAL, TRANSFORAMINAL APPROACH;  Surgeon: Kam Baker MD;  Location: [facility] OR;  Service: Pain Management;  Laterality: Right;       Oncology History:  Oncology History    No history exists.        Review of Systems:  Review of Systems   Constitutional:  Positive for fatigue and unexpected weight change.        + pica: ice cravings   HENT:  Negative for nosebleeds.    Eyes:  Negative for visual disturbance.   Respiratory:  Positive for shortness of breath.    Cardiovascular:  Negative for chest pain.   Gastrointestinal:  Positive for abdominal pain, anal bleeding (hemorrhoidal), diarrhea (x 3 wks) and vomiting (now resolved, + 2wks ago). Negative for blood in stool, constipation and nausea.   Endocrine: Positive for cold intolerance.   Genitourinary:  Negative for hematuria.   Musculoskeletal:  Positive for arthralgias (generalized) and back pain (low back).   Integumentary:  Negative for wound.   Neurological:  Positive for light-headedness (occasional). Negative for headaches.   Hematological:  Negative for adenopathy. Bruises/bleeds easily.  "  Psychiatric/Behavioral:  Positive for decreased concentration.           Physical Exam   Vitals:  /65 (BP Location: Left arm, Patient Position: Sitting, BP Method: Medium (Automatic))   Pulse 79   Temp 98.5 °F (36.9 °C) (Oral)   Resp 18   Ht 5' 4" (1.626 m)   Wt 72 kg (158 lb 9.9 oz)   LMP  (LMP Unknown)   SpO2 95%   BMI 27.23 kg/m²     Labs:  Lab Results   Component Value Date    WBC 11.79 05/16/2024    HGB 8.2 (L) 05/16/2024    HCT 30.4 (L) 05/16/2024    MCV 75 (L) 05/16/2024     (H) 05/16/2024       Lab Results   Component Value Date    IRON 103 02/18/2019    TRANSFERRIN 251 02/18/2019    TIBC 371 02/18/2019    FESATURATED 28 02/18/2019      Lab Results   Component Value Date    FERRITIN 91 02/18/2019     No results found for: "FOLATE"  No results found for: "TDAODYRJ47"       Physical Exam:  Physical Exam     ECOG:   ECOG SCORE                PROCEDURES/IMAGING    CT CAP 3/12/24        Colonoscopy 12/30/20  Impression:           - Severe diverticulosis in the sigmoid colon. There                         was narrowing of the colon in association with the                         diverticular opening. There was evidence of                         diverticular spasm. This is highly likely the                         source for LLQ pain.                         - Two 8 to 10 mm polyps in the distal sigmoid                         colon, removed with a hot snare. Resected and                         retrieved.                         - One non-bleeding colonic angioectasia.                         - Internal hemorrhoids.     Discussion     Problem List:  Problem List Items Addressed This Visit          GI    Diverticulosis    Relevant Orders    CBC Auto Differential    Iron and TIBC    Ferritin    Folate    Vitamin B12     Other Visit Diagnoses       Microcytic anemia    -  Primary    Relevant Orders    CBC Auto Differential    Iron and TIBC    Ferritin    Folate    Vitamin B12    Protein " "electrophoresis, serum    JAY    Immunoglobulins (IgG, IgA, IgM) Quantitative    Immunoglobulin Free LT Chains Blood    Sedimentation rate    Lactate Dehydrogenase    Uric Acid    Anemia, unspecified type        Relevant Orders    Protein electrophoresis, serum    JAY    Immunoglobulins (IgG, IgA, IgM) Quantitative    Immunoglobulin Free LT Chains Blood    Sedimentation rate    Lactate Dehydrogenase    Uric Acid    X-Ray Metastatic Survey Complete    Nutritional anemia        Relevant Orders    CBC Auto Differential    Iron and TIBC    Ferritin    Folate    Vitamin B12    Bone pain        Relevant Orders    X-Ray Metastatic Survey Complete    Hemorrhoids, unspecified hemorrhoid type                 Anemia, microcytic anemia, gi blood loss  - intermittent normocytic anemia since 2017, microcytic evidence starting 12/2022 and microcytic anemia starting 12/11/23  - long covid diagnosis from 12/2023 illness  - 2020 c-scope diverticulosis/severe, angioectasia nonbleeding  - urgent care 5/30/24 for abdominal pain LLQ, v/d and placed on oral iron, colace and miralax; + hemorrhoid bleeding and states her stool is black with continued diarrhea  - reports referral to GI per urgent care "don't need another provider added to list", discussed importance of GI follow up although Gi said no more scopes needed it was based on if pt was asymptomatic and may need repeat for disease monitoring and/or symptoms, verbalizes understanding  - extensive family history of cancers, lung, bone, breast, esophageal  - microcytic anemia 5/16/24, this is likely related to chronic blood loss associated with diverticulosis, hemorrhoids, and diet changes since 12/2023 covid illness; last iron studies 2019  - will include mgus workup calcium elevations noted in 2017, 2020 and normal since, renal: ckd 3a, anemia, has bone pain in legs, joints and back that changes locations, back is related to degenerative changes in lumbar spine  - will do anemia " workup with cbc, iron studies, b12, folate, along with mgus workup and metastatic xrays  - call with results  - continue once daily oral iron at present  - rtc 3 months with labs    Diverticulosis, hemorrhoids  - 3 wks diarrhea, some blood loss with hemorrhoids as well  - likely contributor to microcytic anemia  - has referral to GI placed 5/30/24, it is authorized, noted that pt unable to be reached  - pt is aware after today's discussion the importance of gi follow up, will follow up with gi      Advance Care Planning     Date: 06/12/2024    Power of   I initiated the process of voluntary advance care planning today and explained the importance of this process to the patient.  I introduced the concept of advance directives to the patient, as well. Then the patient received detailed information about the importance of designating a Health Care Power of  (HCPOA). She was also instructed to communicate with this person about their wishes for future healthcare, should she become sick and lose decision-making capacity. The patient has not previously appointed a HCPOA. After our discussion, the patient has decided to complete a HCPOA and has appointed her significant other, health care agent:  Clarke Rodriguez  & health care agent number:  [phone]  I spent a total time of 16 minutes discussing this issue with the patient.            Total time of this visit, including time spent face to face with patient and/or via video/audio, and also in preparing for today's visit for MDM and documentation. (Medical Decision Making, including consideration of possible diagnoses, management options, complex medical record review, review of diagnostic tests and information, consideration and discussion of significant complications based on comorbidities, and discussion with providers involved with the care of the patient) 78 minutes. Greater than 50% was spent face to face with the patient counseling and coordinating  care.            Kaley Saucedo, NP-C  [facility]  Hematology/Oncology  [address] [address]  [place]  [id number]  [phone]

## 2024-06-13 DIAGNOSIS — D64.9 ANEMIA, UNSPECIFIED TYPE: Primary | ICD-10-CM

## 2024-06-13 DIAGNOSIS — M89.8X9 BONE PAIN: ICD-10-CM

## 2024-06-14 ENCOUNTER — TELEPHONE (OUTPATIENT)
Dept: HEMATOLOGY/ONCOLOGY | Facility: CLINIC | Age: 74
End: 2024-06-14
Payer: MEDICARE

## 2024-06-14 DIAGNOSIS — D50.0 IRON DEFICIENCY ANEMIA DUE TO CHRONIC BLOOD LOSS: Primary | ICD-10-CM

## 2024-06-14 PROBLEM — D50.9 IRON DEFICIENCY ANEMIA: Status: ACTIVE | Noted: 2024-06-14

## 2024-06-14 RX ORDER — DIPHENHYDRAMINE HYDROCHLORIDE 50 MG/ML
50 INJECTION INTRAMUSCULAR; INTRAVENOUS ONCE AS NEEDED
OUTPATIENT
Start: 2024-06-28

## 2024-06-14 RX ORDER — SODIUM CHLORIDE 0.9 % (FLUSH) 0.9 %
10 SYRINGE (ML) INJECTION
OUTPATIENT
Start: 2024-06-21

## 2024-06-14 RX ORDER — HEPARIN 100 UNIT/ML
500 SYRINGE INTRAVENOUS
OUTPATIENT
Start: 2024-07-26

## 2024-06-14 RX ORDER — EPINEPHRINE 0.3 MG/.3ML
0.3 INJECTION SUBCUTANEOUS ONCE AS NEEDED
OUTPATIENT
Start: 2024-06-28

## 2024-06-14 RX ORDER — EPINEPHRINE 0.3 MG/.3ML
0.3 INJECTION SUBCUTANEOUS ONCE AS NEEDED
OUTPATIENT
Start: 2024-06-21

## 2024-06-14 RX ORDER — HEPARIN 100 UNIT/ML
500 SYRINGE INTRAVENOUS
OUTPATIENT
Start: 2024-06-21

## 2024-06-14 RX ORDER — EPINEPHRINE 0.3 MG/.3ML
0.3 INJECTION SUBCUTANEOUS ONCE AS NEEDED
OUTPATIENT
Start: 2024-07-26

## 2024-06-14 RX ORDER — SODIUM CHLORIDE 0.9 % (FLUSH) 0.9 %
10 SYRINGE (ML) INJECTION
OUTPATIENT
Start: 2024-06-28

## 2024-06-14 RX ORDER — HEPARIN 100 UNIT/ML
500 SYRINGE INTRAVENOUS
OUTPATIENT
Start: 2024-06-28

## 2024-06-14 RX ORDER — EPINEPHRINE 0.3 MG/.3ML
0.3 INJECTION SUBCUTANEOUS ONCE AS NEEDED
OUTPATIENT
Start: 2024-07-12

## 2024-06-14 RX ORDER — HEPARIN 100 UNIT/ML
500 SYRINGE INTRAVENOUS
OUTPATIENT
Start: 2024-07-12

## 2024-06-14 RX ORDER — SODIUM CHLORIDE 0.9 % (FLUSH) 0.9 %
10 SYRINGE (ML) INJECTION
OUTPATIENT
Start: 2024-07-26

## 2024-06-14 RX ORDER — DIPHENHYDRAMINE HYDROCHLORIDE 50 MG/ML
50 INJECTION INTRAMUSCULAR; INTRAVENOUS ONCE AS NEEDED
OUTPATIENT
Start: 2024-07-26

## 2024-06-14 RX ORDER — EPINEPHRINE 0.3 MG/.3ML
0.3 INJECTION SUBCUTANEOUS ONCE AS NEEDED
OUTPATIENT
Start: 2024-07-05

## 2024-06-14 RX ORDER — DIPHENHYDRAMINE HYDROCHLORIDE 50 MG/ML
50 INJECTION INTRAMUSCULAR; INTRAVENOUS ONCE AS NEEDED
OUTPATIENT
Start: 2024-07-05

## 2024-06-14 RX ORDER — SODIUM CHLORIDE 0.9 % (FLUSH) 0.9 %
10 SYRINGE (ML) INJECTION
OUTPATIENT
Start: 2024-07-12

## 2024-06-14 RX ORDER — DIPHENHYDRAMINE HYDROCHLORIDE 50 MG/ML
50 INJECTION INTRAMUSCULAR; INTRAVENOUS ONCE AS NEEDED
OUTPATIENT
Start: 2024-07-12

## 2024-06-14 RX ORDER — DIPHENHYDRAMINE HYDROCHLORIDE 50 MG/ML
50 INJECTION INTRAMUSCULAR; INTRAVENOUS ONCE AS NEEDED
OUTPATIENT
Start: 2024-07-19

## 2024-06-14 RX ORDER — SODIUM CHLORIDE 0.9 % (FLUSH) 0.9 %
10 SYRINGE (ML) INJECTION
OUTPATIENT
Start: 2024-07-05

## 2024-06-14 RX ORDER — EPINEPHRINE 0.3 MG/.3ML
0.3 INJECTION SUBCUTANEOUS ONCE AS NEEDED
OUTPATIENT
Start: 2024-07-19

## 2024-06-14 RX ORDER — DIPHENHYDRAMINE HYDROCHLORIDE 50 MG/ML
50 INJECTION INTRAMUSCULAR; INTRAVENOUS ONCE AS NEEDED
OUTPATIENT
Start: 2024-06-21

## 2024-06-14 RX ORDER — SODIUM CHLORIDE 0.9 % (FLUSH) 0.9 %
10 SYRINGE (ML) INJECTION
OUTPATIENT
Start: 2024-07-19

## 2024-06-14 RX ORDER — HEPARIN 100 UNIT/ML
500 SYRINGE INTRAVENOUS
OUTPATIENT
Start: 2024-07-05

## 2024-06-14 RX ORDER — HEPARIN 100 UNIT/ML
500 SYRINGE INTRAVENOUS
OUTPATIENT
Start: 2024-07-19

## 2024-06-14 NOTE — TELEPHONE ENCOUNTER
Lab Results   Component Value Date    WBC 11.57 06/12/2024    HGB 9.0 (L) 06/12/2024    HCT 32.2 (L) 06/12/2024    MCV 79 (L) 06/12/2024     (H) 06/12/2024       Lab Results   Component Value Date    IRON 18 (L) 06/12/2024    TRANSFERRIN 270 06/12/2024    TIBC 400 06/12/2024    FESATURATED 5 (L) 06/12/2024      Lab Results   Component Value Date    FERRITIN 39 06/12/2024     Spoke with pt by phone reviewed labs as available at present with ESTEPHANIA and needed iv iron treatment. She can continue her oral iron once daily. Pending spep and yehuda. No lytic findings on metastatic xray 6/13/24. Questions answered. Orders placed for iron sucrose x6 doses.

## 2024-06-18 ENCOUNTER — TELEPHONE (OUTPATIENT)
Dept: HEMATOLOGY/ONCOLOGY | Facility: CLINIC | Age: 74
End: 2024-06-18
Payer: MEDICARE

## 2024-06-18 NOTE — TELEPHONE ENCOUNTER
Left detailed voicemail, reviewed final labs. FLCs stable, immunoglobulins normal, SPEP normal total protein and pattern and JAY no monoclonal peaks. Complete iv iron as ordered. Keep lab and clinic follow up as scheduled 9/2024.

## 2024-07-23 ENCOUNTER — TELEPHONE (OUTPATIENT)
Dept: FAMILY MEDICINE | Facility: CLINIC | Age: 74
End: 2024-07-23
Payer: MEDICARE

## 2024-07-23 NOTE — TELEPHONE ENCOUNTER
Called and spoke with pt in regards of her message. Pt called in regards of needing to schedule her Medicare Wellness apt. Informed pt that I will share her information with Ms Bee Mello to get her scheduled for this matter.

## 2024-07-23 NOTE — TELEPHONE ENCOUNTER
----- Message from Pushpa Prieto sent at 7/23/2024 10:50 AM CDT -----  Regarding: soon appt  Type:  Sooner Apoointment Request    Caller is requesting a sooner appointment.  Caller declined first available appointment listed below.  Caller will not accept being placed on the waitlist and is requesting a message be sent to doctor.  Name of Caller:  When is the first available appointment?Jan 2025  Symptoms:check up  Would the patient rather a call back or a response via [facility]? Call  Best Call Back Number:[phone]  Additional Information:

## 2024-07-31 ENCOUNTER — OFFICE VISIT (OUTPATIENT)
Dept: FAMILY MEDICINE | Facility: CLINIC | Age: 74
End: 2024-07-31
Payer: MEDICARE

## 2024-07-31 VITALS
OXYGEN SATURATION: 97 % | WEIGHT: 159.31 LBS | BODY MASS INDEX: 27.2 KG/M2 | HEART RATE: 70 BPM | HEIGHT: 64 IN | SYSTOLIC BLOOD PRESSURE: 110 MMHG | DIASTOLIC BLOOD PRESSURE: 70 MMHG

## 2024-07-31 DIAGNOSIS — I25.810 CORONARY ARTERY DISEASE INVOLVING AUTOLOGOUS VEIN BYPASS GRAFT, UNSPECIFIED WHETHER ANGINA PRESENT: ICD-10-CM

## 2024-07-31 DIAGNOSIS — N25.81 HYPERPARATHYROIDISM, SECONDARY: ICD-10-CM

## 2024-07-31 DIAGNOSIS — R91.8 PULMONARY NODULES: ICD-10-CM

## 2024-07-31 DIAGNOSIS — D50.0 IRON DEFICIENCY ANEMIA DUE TO CHRONIC BLOOD LOSS: ICD-10-CM

## 2024-07-31 DIAGNOSIS — I50.32 CHRONIC DIASTOLIC HEART FAILURE: ICD-10-CM

## 2024-07-31 DIAGNOSIS — I70.0 AORTIC ATHEROSCLEROSIS: ICD-10-CM

## 2024-07-31 DIAGNOSIS — J43.2 CENTRILOBULAR EMPHYSEMA: ICD-10-CM

## 2024-07-31 DIAGNOSIS — K21.00 GASTROESOPHAGEAL REFLUX DISEASE WITH ESOPHAGITIS, UNSPECIFIED WHETHER HEMORRHAGE: ICD-10-CM

## 2024-07-31 DIAGNOSIS — G89.4 CHRONIC PAIN SYNDROME: ICD-10-CM

## 2024-07-31 DIAGNOSIS — I73.9 PAD (PERIPHERAL ARTERY DISEASE): ICD-10-CM

## 2024-07-31 DIAGNOSIS — Z00.00 ENCOUNTER FOR PREVENTIVE HEALTH EXAMINATION: ICD-10-CM

## 2024-07-31 DIAGNOSIS — I25.10 ATHEROSCLEROSIS OF CORONARY ARTERY, UNSPECIFIED VESSEL OR LESION TYPE, UNSPECIFIED WHETHER ANGINA PRESENT, UNSPECIFIED WHETHER NATIVE OR TRANSPLANTED HEART: ICD-10-CM

## 2024-07-31 DIAGNOSIS — I12.9 BENIGN HYPERTENSION WITH CKD (CHRONIC KIDNEY DISEASE) STAGE III: ICD-10-CM

## 2024-07-31 DIAGNOSIS — Z00.00 ENCOUNTER FOR MEDICARE ANNUAL WELLNESS EXAM: Primary | ICD-10-CM

## 2024-07-31 DIAGNOSIS — N18.31 STAGE 3A CHRONIC KIDNEY DISEASE: ICD-10-CM

## 2024-07-31 DIAGNOSIS — N18.30 BENIGN HYPERTENSION WITH CKD (CHRONIC KIDNEY DISEASE) STAGE III: ICD-10-CM

## 2024-07-31 DIAGNOSIS — E79.0 HYPERURICEMIA: ICD-10-CM

## 2024-07-31 DIAGNOSIS — E78.5 HYPERLIPIDEMIA, UNSPECIFIED HYPERLIPIDEMIA TYPE: ICD-10-CM

## 2024-07-31 PROCEDURE — 99999 PR PBB SHADOW E&M-EST. PATIENT-LVL V: CPT | Mod: PBBFAC,,,

## 2024-07-31 PROCEDURE — 99215 OFFICE O/P EST HI 40 MIN: CPT | Mod: PBBFAC,PN

## 2024-07-31 PROCEDURE — G0439 PPPS, SUBSEQ VISIT: HCPCS | Mod: ,,,

## 2024-07-31 NOTE — PATIENT INSTRUCTIONS
Counseling and Referral of Other Preventative  (Italic type indicates deductible and co-insurance are waived)    Patient Name: Caryn Rodriguez  Today's Date: 7/31/2024    Health Maintenance       Date Due Completion Date    Annual UACr Never done ---    TETANUS VACCINE Never done ---    Shingles Vaccine (1 of 2) Never done ---    RSV Vaccine (Age 60+ and Pregnant patients) (1 - 1-dose 60+ series) Never done ---    COVID-19 Vaccine (4 - 2023-24 season) 09/01/2023 10/19/2021    Influenza Vaccine (1) 09/01/2024 12/13/2023    High Dose Statin 06/20/2025 6/20/2024    DEXA Scan 07/05/2026 7/5/2023    Lipid Panel 05/13/2029 5/13/2024        Orders Placed This Encounter   Procedures    Microalbumin/Creatinine Ratio, Urine     The following information is provided to all patients.  This information is to help you find resources for any of the problems found today that may be affecting your health:                  Living healthy guide: www.[facility].[place].gov      Understanding Diabetes: www.diabetes.org      Eating healthy: www.cdc.gov/healthyweight      [facility][note 3] home safety checklist: www.cdc.gov/steadi/patient.html      Agency on Aging: www.goea.[place].gov      Alcoholics anonymous (AA): www.aa.org      Physical Activity: www.ector.nih.gov/pb7yksz      Tobacco use: www.quitwithusla.org

## 2024-07-31 NOTE — PROGRESS NOTES
Caryn Rodriguez presented for a  Medicare AWV and comprehensive Health Risk Assessment today. The following components were reviewed and updated:    Medical history  Family History  Social history  Allergies and Current Medications  Health Risk Assessment  Health Maintenance  Care Team         ** See Completed Assessments for Annual Wellness Visit within the encounter summary.**         The following assessments were completed:  Living Situation  CAGE  Depression Screening  Timed Get Up and Go  Whisper Test  Cognitive Function Screening    Nutrition Screening  ADL Screening  PAQ Screening      Opioid documentation for eAWV      Patient does not have a current opioid prescription.        Review for Substance Use Disorders: Patient does not use substance        Current Outpatient Medications:     albuterol (ACCUNEB) 1.25 mg/3 mL Nebu, Take 3 mLs (1.25 mg total) by nebulization every 6 (six) hours as needed. Rescue, Disp: 3 each, Rfl: 3    allopurinoL (ZYLOPRIM) 100 MG tablet, TAKE 1 TABLET BY MOUTH ONE TIME DAILY, Disp: 90 tablet, Rfl: 3    ALPRAZolam (XANAX) 0.5 MG tablet, Take 1 tablet (0.5 mg total) by mouth daily as needed for Anxiety., Disp: 30 tablet, Rfl: 1    amLODIPine (NORVASC) 2.5 MG tablet, , Disp: , Rfl:     aspirin 81 MG Chew, Take 81 mg by mouth once daily., Disp: , Rfl:     b complex vitamins capsule, Take 1 capsule by mouth once daily., Disp: , Rfl:     baclofen (LIORESAL) 10 MG tablet, Take 5 mg by mouth once daily., Disp: , Rfl:     docusate sodium (COLACE) 100 MG capsule, Take 1 capsule (100 mg total) by mouth 2 (two) times daily., Disp: 60 capsule, Rfl: 3    DULoxetine (CYMBALTA) 30 MG capsule, , Disp: , Rfl:     ferrous sulfate (FEOSOL) 325 mg (65 mg iron) Tab tablet, Take 1 tablet (325 mg total) by mouth daily with breakfast. (Patient taking differently: Take 325 mg by mouth daily with breakfast. Take every other day while getting iron infusion), Disp: 30 tablet, Rfl: 3    gabapentin  "(NEURONTIN) 300 MG capsule, Take 300 mg by mouth once daily. , Disp: , Rfl:     metoprolol succinate (TOPROL-XL) 50 MG 24 hr tablet, Take 100 mg by mouth 2 (two) times daily., Disp: , Rfl:     montelukast (SINGULAIR) 10 mg tablet, Take 1 tablet (10 mg total) by mouth every evening., Disp: 180 tablet, Rfl: 3    omeprazole (PRILOSEC) 40 MG capsule, Take 1 capsule (40 mg total) by mouth once daily., Disp: 30 capsule, Rfl: 2    rosuvastatin (CRESTOR) 20 MG tablet, Take 20 mg by mouth once daily., Disp: , Rfl:     spironolactone (ALDACTONE) 25 MG tablet, Take 1 tablet (25 mg total) by mouth once daily., Disp: 90 tablet, Rfl: 3    TRELEGY ELLIPTA 100-62.5-25 mcg DsDv, INHALE 1 PUFF INTO THE LUNGS EVERY DAY, Disp: 180 each, Rfl: 0    vitamin E 1000 UNIT capsule, Take 1,000 Units by mouth once daily., Disp: , Rfl:        Vitals:    07/31/24 1408   BP: 110/70   Pulse: 70   SpO2: 97%   Weight: 72.3 kg (159 lb 4.5 oz)   Height: 5' 4" (1.626 m)      Physical Exam  Vitals reviewed.   Constitutional:       General: She is not in acute distress.     Appearance: She is not ill-appearing.   Cardiovascular:      Rate and Rhythm: Normal rate and regular rhythm.   Pulmonary:      Effort: Pulmonary effort is normal. No respiratory distress.      Breath sounds: Normal breath sounds. No wheezing, rhonchi or rales.   Musculoskeletal:         General: Normal range of motion.   Skin:     General: Skin is warm and dry.      Findings: No bruising or rash.   Neurological:      General: No focal deficit present.      Mental Status: She is alert.      Gait: Gait normal.   Psychiatric:         Speech: Speech normal.         Behavior: Behavior is cooperative.         Cognition and Memory: Cognition and memory normal.           Diagnoses and health risks identified today and associated recommendations/orders:    1. Encounter for Medicare annual wellness exam  2. Encounter for preventive health examination  - Ambulatory Referral/Consult to [facility] " Annual Wellness Visit (eAWV)  - Chart reviewed. Problem list updated. Discussed current medical diagnosis, current medications, medical/surgical/family/social history; updated provider list; documented vital signs; identified any cognitive impairment; and updated risk factor list. Addressed any outstanding health maintenance. Provided patient with personalized health advice. Continue to follow up with PCP and any specialists.      3. Aortic atherosclerosis  4. Hyperlipidemia, unspecified hyperlipidemia type  5. Coronary artery disease involving autologous vein bypass graft, unspecified whether angina present  6. Atherosclerosis of coronary artery, unspecified vessel or lesion type, unspecified whether angina present, unspecified whether native or transplanted heart  Chronic. Noted on CT chest abdomen pelvis 3/2024. On ASA and rosuvastatin. Followed by PCP and Cardiology (Dr. Marquez with [facility])     7. Stage 3a chronic kidney disease  - Microalbumin/Creatinine Ratio, Urine; Future  Chronic. GFR 47.2 (6/2024). Followed by Nephrology.     8. Centrilobular emphysema  9. Pulmonary nodules  Chronic. Noted on CT chest abdomen pelvis 3/2024. On trelegy ellipta, montelukast, albuterol neb tx PRN. Followed by PCP.     10. Benign hypertension with CKD (chronic kidney disease) stage III  Chronic. 110/70 in clinic today. On spironolactone, metoprolol and amlodipine. Followed by Cardiology/PCP.     11. PAD (peripheral artery disease)  Chronic. Stable. On ASA and rosuvastatin. Followed by Cardiology.     12. Chronic diastolic heart failure  Chronic. Stable. On spironolactone, metoprolol and amlodipine. Followed by Cardiology.      13. Hyperparathyroidism, secondary  Chronic.  (6/2024). Followed by PCP.    14. Iron deficiency anemia due to chronic blood loss  Chronic. H/H 12.2/40.7 (7/2024). On ferrous sulfate and received iron infusions. Followed by Hematology.     15. Gastroesophageal reflux disease with esophagitis,  unspecified whether hemorrhage  Chronic. Stable. On omeprazole. Followed by PCP.     16. Hyperuricemia  Chronic. Uric acid 4.9 (6/2024). On allopurinol. Followed by PCP.     17. Chronic pain syndrome  Chronic. Stable. On baclofen, gabapentin, and cymbalta. Followed by PCP.       Provided Caryn with a 5-10 year written screening schedule and personal prevention plan. Recommendations were developed using the USPSTF age appropriate recommendations. Education, counseling, and referrals were provided as needed. After Visit Summary printed and given to patient which includes a list of additional screenings\tests needed.    Follow up in about 1 year (around 7/31/2025) for Your next wellness visit.    Advance Care Planning       I offered to discuss advanced care planning, including how to pick a person who would make decisions for you if you were unable to make them for yourself, called a health care power of , and what kind of decisions you might make such as use of life sustaining treatments such as ventilators and tube feeding when faced with a life limiting illness recorded on a living will that they will need to know. (How you want to be cared for as you near the end of your natural life)     X  Patient is unwilling to engage in a discussion regarding advance directives at this time.    JANY Gutierrez-VIKKI  Family Medicine  [facility]

## 2024-09-01 PROBLEM — R80.9 MICROALBUMINURIA: Status: ACTIVE | Noted: 2024-09-01

## 2024-09-01 PROBLEM — N18.32 CHRONIC KIDNEY DISEASE (CKD) STAGE G3B/A1, MODERATELY DECREASED GLOMERULAR FILTRATION RATE (GFR) BETWEEN 30-44 ML/MIN/1.73 SQUARE METER AND ALBUMINURIA CREATININE RATIO LESS THAN 30 MG/G: Status: ACTIVE | Noted: 2024-09-01

## 2024-09-01 PROBLEM — D63.1 ANEMIA IN STAGE 3B CHRONIC KIDNEY DISEASE: Status: ACTIVE | Noted: 2024-09-01

## 2024-09-01 PROBLEM — N28.1 RENAL CYST: Status: ACTIVE | Noted: 2024-09-01

## 2024-09-01 PROBLEM — N18.32 ANEMIA IN STAGE 3B CHRONIC KIDNEY DISEASE: Status: ACTIVE | Noted: 2024-09-01

## 2024-09-01 PROBLEM — E83.52 HYPERCALCEMIA: Status: ACTIVE | Noted: 2024-09-01

## 2024-09-18 NOTE — PROGRESS NOTES
"Patient ID: Caryn Rodriguez is a 74 y.o. female.    Chief Complaint: anemia, CKD        History       HPI    Caryn Rodriguez is a 74yr old female, new to [facility] and this provider, referred for anemia.    - intermittent normocytic anemia since 2017, microcytic anemia starting 23  - ckd 3b, gfr has dropped as low as 17.2 in 2020, 3a range since 2020, reports one functioning kidney, follows Dr Noyola in nephrology   - 20 colonoscopy severe diverticulosis in sigmoid, diverticular spasm, 2 polyps removed, one non-bleeding colonic angioectasia, internal hemorrhoids. Noted per Dr Day "No repeat colonoscopy due to current age (66 years or older) and the absence of advanced   adenomas."  - saw cardiology 24 and sent to ED h/h ..8, at ED hgb improved at 8 and no blood t/f needed  - went to urgent care 24 for abdominal pain LLQ, vomiting, diarrhea and placed on oral iron, colace and miralax; has had blood loss she notes from hemorrhoids and states her stool is black with no obvious visible blood  - she has changed her diet, decreased appetite since 2023 covid,  reduced meat intake, eating her vegetables still   - 2023 extended illness with covid 11-12 days, lost 15# at that time, has been diagnosed with long term covid by her pcp and has had continued decreased; weight prior this 175#, today 158#  - endorses fatigue, feeling winded, has to take a break if walking up stairs; decreased concentration, she has intermittent LLQ pain associated with diverticular pain, continued diarrhea past 2-3 wks, hemorrhoidal blood loss, + easy bruising.   - denies headaches, chest pain, hemoptysis, hematemesis, hematuria, hair or nail changes    Hysterectomy, complete 50yrs ago. Colonoscopy . MMG 23 negative.      Family history  Mother sarcadoma of spine,  age 55yrs. Father cva age 50,  from stroke. Sister breast cancer, lung cancer, passed in 2017 from lung cancer. One brother age " "60 dec lung cancer, brain cancer. One brother  63yrs esophageal cancer. One brother  bone cancer. One brother 80yrs  chf, brain tumor, copd. Siblings that  in earlier years all were around chemicals/herbicides and pesticides in [facility].  One granddaugther neuroblastoma age 17 months, doing well, age 22yrs. No known history multiple myeloma.       INTERVAL  - pt is here for anemia follow up 24  - x6 iron sucrose July/Aug 2024  - pt states "you fixed me", has been feeling well since receiving iv iron with increased energy  - reports improvement in winded sensation occurring less frequently, and mainly with stairs only; denies abdominal pain, hemorrhoidal issues at present.   - denies headaches, dizziness, chest pain, hemoptysis, hematemesis, hematuria, hair or nail changes, night sweats, lymph adenopathy  - weight stable, appetite good    Wt Readings from Last 3 Encounters:   24 73.6 kg (162 lb 4.1 oz)   24 72.1 kg (159 lb)   24 72.3 kg (159 lb 6.3 oz)               Past medical, surgical, and medication history, past family history reviewed and updated with patient today as noted.      Past Medical History:   Diagnosis Date    CKD (chronic kidney disease), stage III     COPD (chronic obstructive pulmonary disease)     Coronary artery disease     Disorder of kidney and ureter     Diverticulosis     Encounter for blood transfusion     GERD (gastroesophageal reflux disease)     Hypertension     PONV (postoperative nausea and vomiting)        Past Surgical History:   Procedure Laterality Date    COLONOSCOPY N/A 2020    Procedure: COLONOSCOPY;  Surgeon: Vale Day MD;  Location: [facility];  Service: Endoscopy;  Laterality: N/A;    Double Bypass      HYSTERECTOMY      INJECTION OF ANESTHETIC AGENT AROUND MEDIAL BRANCH NERVES INNERVATING LUMBAR FACET JOINT Right 3/13/2020    Procedure: BLOCK, NERVE, FACET JOINT, LUMBAR, MEDIAL BRANCH;  Surgeon: Kam BAKER" MD Olivia;  Location: [facility] OR;  Service: Pain Management;  Laterality: Right;  L2,3,4,5    INJECTION OF ANESTHETIC AGENT INTO SACROILIAC JOINT Right 12/4/2020    Procedure: BLOCK, SACROILIAC JOINT;  Surgeon: Kam Baker MD;  Location: [facility] OR;  Service: Pain Management;  Laterality: Right;    INJECTION OF ANESTHETIC AGENT INTO SACROILIAC JOINT Bilateral 6/4/2021    Procedure: BLOCK, SACROILIAC JOINT;  Surgeon: Kam Baker MD;  Location: [facility] OR;  Service: Pain Management;  Laterality: Bilateral;    INSERTION OF IMPLANTABLE LOOP RECORDER N/A 3/11/2019    Procedure: Insertion, Implantable Loop Recorder;  Surgeon: Art Marquez MD;  Location: [facility] CATH LAB;  Service: Cardiology;  Laterality: N/A;    INSERTION OF MULTIFOCAL INTRAOCULAR LENS Right 11/20/2019    Procedure: INSERTION, IOL, MULTIFOCAL;  Surgeon: Larry Leal MD;  Location: [facility] OR;  Service: Ophthalmology;  Laterality: Right;    insertion of stents      abdominal    PHACOEMULSIFICATION OF CATARACT Right 11/20/2019    Procedure: PHACOEMULSIFICATION, CATARACT;  Surgeon: Larry Leal MD;  Location: [facility] OR;  Service: Ophthalmology;  Laterality: Right;    TRANSFORAMINAL EPIDURAL INJECTION OF STEROID Right 1/31/2020    Procedure: INJECTION, STEROID, EPIDURAL, TRANSFORAMINAL APPROACH;  Surgeon: Kam Bakre MD;  Location: [facility] OR;  Service: Pain Management;  Laterality: Right;  L4, L5    TRANSFORAMINAL EPIDURAL INJECTION OF STEROID Right 10/23/2020    Procedure: INJECTION, STEROID, EPIDURAL, TRANSFORAMINAL APPROACH;  Surgeon: Kam Baker MD;  Location: [facility] OR;  Service: Pain Management;  Laterality: Right;       Oncology History:  Oncology History    No history exists.        Review of Systems:  Review of Systems   Constitutional:  Negative for appetite change, fatigue, fever, night sweats and unexpected weight change.        Pica: resolved at present   HENT:  Negative for mouth sores and nosebleeds.   "  Eyes:  Negative for visual disturbance.   Respiratory:  Positive for shortness of breath (2/2 COPD). Negative for cough.    Cardiovascular:  Negative for chest pain.   Gastrointestinal:  Negative for abdominal pain, blood in stool, constipation, diarrhea (x 3 wks), nausea and vomiting (now resolved, + 2wks ago). Anal bleeding: hemorrhoidal, currently resolved.  Endocrine: Negative for cold intolerance.   Genitourinary:  Negative for frequency and hematuria.   Musculoskeletal:  Positive for arthralgias (generalized) and back pain (low back).   Integumentary:  Negative for rash and wound.   Neurological:  Negative for light-headedness (occasional) and headaches.   Hematological:  Negative for adenopathy. Does not bruise/bleed easily.   Psychiatric/Behavioral:  Negative for decreased concentration.           Physical Exam   Vitals:    BP (!) 148/72 (BP Location: Left arm, Patient Position: Sitting, BP Method: Medium (Automatic))   Pulse 67   Temp 97.8 °F (36.6 °C) (Oral)   Resp 18   Ht 5' 4" (1.626 m)   Wt 73.6 kg (162 lb 4.1 oz)   LMP  (LMP Unknown)   SpO2 (!) 94%   BMI 27.85 kg/m²     Labs:  Lab Results   Component Value Date    WBC 7.01 09/16/2024    HGB 14.1 09/16/2024    HCT 44.6 09/16/2024    MCV 92 09/16/2024     09/16/2024       Lab Results   Component Value Date    IRON 62 09/16/2024    TRANSFERRIN 213 09/16/2024    TIBC 315 09/16/2024    FESATURATED 20 09/16/2024      Lab Results   Component Value Date    FERRITIN 305 (H) 09/16/2024     Lab Results   Component Value Date    FOLATE 7.5 06/12/2024     Lab Results   Component Value Date    HZGNCHOZ72 950 06/12/2024          Physical Exam:  Physical Exam  Vitals reviewed.   Constitutional:       General: She is not in acute distress.     Appearance: Normal appearance.   HENT:      Nose: No congestion.      Mouth/Throat:      Mouth: Mucous membranes are moist.   Eyes:      Extraocular Movements: Extraocular movements intact.   Cardiovascular:      " Rate and Rhythm: Normal rate and regular rhythm.      Pulses: Normal pulses.      Heart sounds: Normal heart sounds.   Pulmonary:      Effort: Pulmonary effort is normal.      Breath sounds: Normal breath sounds. No wheezing, rhonchi or rales.   Abdominal:      Tenderness: There is no abdominal tenderness.   Musculoskeletal:         General: Normal range of motion.      Cervical back: Normal range of motion.   Skin:     General: Skin is warm and dry.   Neurological:      Mental Status: She is alert and oriented to person, place, and time.   Psychiatric:         Mood and Affect: Mood normal.         Behavior: Behavior normal.          ECOG:   ECOG SCORE    0 - Fully active-able to carry on all pre-disease performance without restriction        PROCEDURES/IMAGING    CT CAP 3/12/24  Impression:     No acute findings.     Stable pulmonary nodules.     Diverticulosis coli.     Emphysema.     Cholelithiasis.         Colonoscopy 12/30/20  Impression:           - Severe diverticulosis in the sigmoid colon. There                         was narrowing of the colon in association with the                         diverticular opening. There was evidence of                         diverticular spasm. This is highly likely the                         source for LLQ pain.                         - Two 8 to 10 mm polyps in the distal sigmoid                         colon, removed with a hot snare. Resected and                         retrieved.                         - One non-bleeding colonic angioectasia.                         - Internal hemorrhoids.     Discussion     Problem List:  Problem List Items Addressed This Visit          Oncology    Iron deficiency anemia due to chronic blood loss - Primary       GI    Diverticulosis     Other Visit Diagnoses       Microcytic anemia        Hemorrhoids, unspecified hemorrhoid type                   Anemia, microcytic anemia, gi blood loss  - intermittent normocytic anemia since 2017,  "microcytic evidence starting 12/2022 and microcytic anemia starting 12/11/23  - long covid diagnosis from 12/2023 illness  - 2020 c-scope diverticulosis/severe, angioectasia nonbleeding  - urgent care 5/30/24 for abdominal pain LLQ, v/d and placed on oral iron, colace and miralax; + hemorrhoid bleeding and states her stool is black with continued diarrhea  - reports referral to GI per urgent care "don't need another provider added to list", discussed importance of GI follow up although GI said no more scopes needed it was based on if pt was asymptomatic and may need repeat for disease monitoring and/or symptoms, verbalizes understanding  - extensive family history of cancers, lung, bone, breast, esophageal  - microcytic anemia 5/16/24, this is likely related to chronic blood loss associated with diverticulosis, hemorrhoids, and diet changes since 12/2023 covid illness; last iron studies 2019  - will include mgus workup calcium elevations noted in 2017, 2020 and normal since, renal: ckd 3a, anemia, has bone pain in legs, joints and back that changes locations, back is related to degenerative changes in lumbar spine  6/12/24 microcytic anemia with iron sat 5%, SPEP and JAY normal, immunoglobulins normal, FLC  kappa elevation with normal ratio  - x6 iron sucrose July/Aug 2024  - 9/16/24 h/h normal 14.1/44.6, normocytic, iron normal with ferritin 305 ng/mL, anemia and iron deficiency currently resolved   - Will manage iron deficiency with iron rich foods   - rtc 4-5 months with labs to see Dr. Bradshaw as she would like to f/u in [place]    Diverticulosis, hemorrhoids  - No recent symptoms or flare  - likely contributor to microcytic anemia  - has referral to GI placed 5/30/24, it is authorized, noted that pt unable to be reached; she has not followed with gi at present with symptom improvement  - pt does not want to add another specialist unless absolutely necessary and declines appt assistance      Advance Care Planning "     Date: 06/12/2024    Power of   After our discussion, the patient has decided to complete a HCPOA and has appointed her significant other, health care agent:  Clarkegeneva Carvajaljil  & health care agent number:  [phone]            Total time of this visit, including time spent face to face with patient and/or via video/audio, and also in preparing for today's visit for MDM and documentation. (Medical Decision Making, including consideration of possible diagnoses, management options, complex medical record review, review of diagnostic tests and information, consideration and discussion of significant complications based on comorbidities, and discussion with providers involved with the care of the patient) 25 minutes. Greater than 50% was spent face to face with the patient counseling and coordinating care.            Kaley Saucedo, NP-C  [facility]  Hematology/Oncology  [address] [address]  MIRTHA Krueger  [phone]

## 2024-09-19 ENCOUNTER — OFFICE VISIT (OUTPATIENT)
Dept: HEMATOLOGY/ONCOLOGY | Facility: CLINIC | Age: 74
End: 2024-09-19
Payer: MEDICARE

## 2024-09-19 VITALS
OXYGEN SATURATION: 94 % | HEART RATE: 67 BPM | TEMPERATURE: 98 F | DIASTOLIC BLOOD PRESSURE: 72 MMHG | SYSTOLIC BLOOD PRESSURE: 148 MMHG | RESPIRATION RATE: 18 BRPM | HEIGHT: 64 IN | WEIGHT: 162.25 LBS | BODY MASS INDEX: 27.7 KG/M2

## 2024-09-19 DIAGNOSIS — K57.90 DIVERTICULOSIS: ICD-10-CM

## 2024-09-19 DIAGNOSIS — D50.9 MICROCYTIC ANEMIA: ICD-10-CM

## 2024-09-19 DIAGNOSIS — D50.0 IRON DEFICIENCY ANEMIA DUE TO CHRONIC BLOOD LOSS: Primary | ICD-10-CM

## 2024-09-19 DIAGNOSIS — K64.9 HEMORRHOIDS, UNSPECIFIED HEMORRHOID TYPE: ICD-10-CM

## 2024-09-19 PROCEDURE — 99213 OFFICE O/P EST LOW 20 MIN: CPT | Mod: S$PBB,,, | Performed by: NURSE PRACTITIONER

## 2024-09-19 PROCEDURE — 99215 OFFICE O/P EST HI 40 MIN: CPT | Mod: PBBFAC,PN | Performed by: NURSE PRACTITIONER

## 2024-09-19 PROCEDURE — 99999 PR PBB SHADOW E&M-EST. PATIENT-LVL V: CPT | Mod: PBBFAC,,, | Performed by: NURSE PRACTITIONER

## 2024-09-19 RX ORDER — BACLOFEN 5 MG/1
5 TABLET ORAL
COMMUNITY
Start: 2024-09-03

## 2024-09-19 NOTE — PATIENT INSTRUCTIONS
Iron rich foods listed and then Vit C rich foods listed. Combine vit C rich foods with iron rich foods to enhance absorption.    IRON RICH list from [employer]cross[employer].org  Meat and Eggs    Beef  Lamb  Ham  Turkey  Chicken  Veal  Pork  Dried beef  Liver  Liverwurst  Eggs (any style)  Seafood    Shrimp  Clams  Scallops  Oysters  Tuna  Sardines  [place]inna  Mackerel  Vegetables    Spinach  Sweet potatoes  Peas  Broccoli  String beans  Beet greens  Dandelion greens  Collards  Kale  Chard  Bread and Cereals    White bread (enriched)  Whole wheat bread  Enriched pasta  Wheat products  Bran cereals  Corn meal  Oat cereal  Cream of Wheat  Rye bread  Enriched rice  Fruit    Strawberries  Watermelon  Raisins  Dates  Figs  Prunes  Prune juice  Dried apricots  Dried peaches  Beans and Other Foods    Tofu  Beans (kidney, garbanzo, or white, canned)  Tomato products (e.g., paste)  Dried peas  Dried beans  Lentils  Instant breakfast  Corn syrup  Maple syrup  Molasses      Vit C RICH FOODS  citrus fruit, such as oranges and orange juice, ashish, pineapple.  Peppers.  tomatoes  strawberries.  Guava, kiwi, papaya  Blackcurrants, blackberries.  broccoli.  brussels sprouts.  Mustard greens.  Red cabbage.  Kale.  potatoes.

## 2024-10-18 DIAGNOSIS — J44.9 CHRONIC OBSTRUCTIVE PULMONARY DISEASE, UNSPECIFIED COPD TYPE: ICD-10-CM

## 2024-10-18 RX ORDER — FLUTICASONE FUROATE, UMECLIDINIUM BROMIDE AND VILANTEROL TRIFENATATE 100; 62.5; 25 UG/1; UG/1; UG/1
1 POWDER RESPIRATORY (INHALATION) DAILY
Qty: 180 EACH | Refills: 0 | Status: SHIPPED | OUTPATIENT
Start: 2024-10-18

## 2024-10-18 NOTE — TELEPHONE ENCOUNTER
No care due was identified.  [facility] Embedded Care Due Messages. Reference number: [id number].   10/18/2024 1:19:20 PM CDT

## 2024-10-18 NOTE — TELEPHONE ENCOUNTER
----- Message from Pushpa sent at 10/18/2024 11:44 AM CDT -----  Regarding: refill  Type:  RX Refill Request    Who Called: pt  Refill or New Rx:refill   RX Name and Strength:TRELEGY ELLIPTA 100-62.5-25 mcg DsDv  Preferred Pharmacy with phone number:[id number] - [place] [address] AT [facility] [address] [address]  Phone: [phone] Fax: [phone]  Local or Mail Order:local   Ordering Provider:Champagne   Would the patient rather a call back or a response via [facility]? Call   Best Call Back Number:[phone]  Additional Information:

## 2025-01-30 DIAGNOSIS — J44.9 CHRONIC OBSTRUCTIVE PULMONARY DISEASE, UNSPECIFIED COPD TYPE: ICD-10-CM

## 2025-01-30 NOTE — TELEPHONE ENCOUNTER
No care due was identified.  [facility] Embedded Care Due Messages. Reference number: [id number].   1/30/2025 12:57:36 PM CST

## 2025-01-31 RX ORDER — FLUTICASONE FUROATE, UMECLIDINIUM BROMIDE AND VILANTEROL TRIFENATATE 100; 62.5; 25 UG/1; UG/1; UG/1
1 POWDER RESPIRATORY (INHALATION)
Qty: 180 EACH | Refills: 0 | Status: SHIPPED | OUTPATIENT
Start: 2025-01-31

## 2025-02-27 ENCOUNTER — TELEPHONE (OUTPATIENT)
Dept: FAMILY MEDICINE | Facility: CLINIC | Age: 75
End: 2025-02-27
Payer: MEDICARE

## 2025-02-27 NOTE — TELEPHONE ENCOUNTER
----- Message from Shreya sent at 2/27/2025  1:36 PM CST -----  Appointment RequestName of Caller:Pt and husbandSymptoms or reason for appointment:follow up/ rescheduleBest Call Back Number:[phone] states her and her husbands last 2 appointment have been cancelled and not sure why. She would like to reschedule with her  Clarkegeneva Carvajaljil

## 2025-02-27 NOTE — TELEPHONE ENCOUNTER
Called pt lvm to call office back in regards of needing to reschedule their appointment for today. Appointments were made incorrectly.

## 2025-02-28 NOTE — TELEPHONE ENCOUNTER
Spoke with pt in regards of her message on yesterday.  Patient rescheduled her apt to see NEGIN baxter for 3/6/2025 for 10:40 am

## 2025-03-03 DIAGNOSIS — J44.9 CHRONIC OBSTRUCTIVE PULMONARY DISEASE, UNSPECIFIED COPD TYPE: ICD-10-CM

## 2025-03-03 RX ORDER — MONTELUKAST SODIUM 10 MG/1
10 TABLET ORAL NIGHTLY
Qty: 90 TABLET | Refills: 3 | Status: SHIPPED | OUTPATIENT
Start: 2025-03-03

## 2025-03-03 NOTE — TELEPHONE ENCOUNTER
Care Due:                  Date            Visit Type   Department     Provider  --------------------------------------------------------------------------------                                EP -                              PRIMARY      DESC FAMILY  Last Visit: 03-      [facility]   [facility]  Lesley Sams  Next Visit: None Scheduled  None         None Found                                                            Last  Test          Frequency    Reason                     Performed    Due Date  --------------------------------------------------------------------------------    Office Visit  15 months..  montelukast..............  03- 05-    [facility] Embedded Care Due Messages. Reference number: [id number].   3/03/2025 3:50:44 AM CST

## 2025-03-03 NOTE — TELEPHONE ENCOUNTER
Refill Routing Note   Medication(s) are not appropriate for processing by [facility] for the following reason(s):        Patient not seen by provider within 15 months    [facility] action(s):  Defer     Requires appointment : Yes             Appointments  past 12m or future 3m with PCP    Date Provider   Last Visit   5/31/2023 Patria Marsh, DO   Next Visit   Visit date not found Patria Marsh, DO   ED visits in past 90 days: 0        Note composed:12:05 PM 03/03/2025

## 2025-03-06 ENCOUNTER — OFFICE VISIT (OUTPATIENT)
Dept: FAMILY MEDICINE | Facility: CLINIC | Age: 75
End: 2025-03-06
Payer: MEDICARE

## 2025-03-06 VITALS
HEIGHT: 64 IN | WEIGHT: 167.88 LBS | SYSTOLIC BLOOD PRESSURE: 138 MMHG | HEART RATE: 69 BPM | DIASTOLIC BLOOD PRESSURE: 88 MMHG | BODY MASS INDEX: 28.66 KG/M2 | OXYGEN SATURATION: 95 %

## 2025-03-06 DIAGNOSIS — N18.32 CHRONIC KIDNEY DISEASE (CKD) STAGE G3B/A1, MODERATELY DECREASED GLOMERULAR FILTRATION RATE (GFR) BETWEEN 30-44 ML/MIN/1.73 SQUARE METER AND ALBUMINURIA CREATININE RATIO LESS THAN 30 MG/G: ICD-10-CM

## 2025-03-06 DIAGNOSIS — R91.8 PULMONARY NODULES: ICD-10-CM

## 2025-03-06 DIAGNOSIS — N18.32 ANEMIA IN STAGE 3B CHRONIC KIDNEY DISEASE: ICD-10-CM

## 2025-03-06 DIAGNOSIS — I70.0 AORTIC ATHEROSCLEROSIS: ICD-10-CM

## 2025-03-06 DIAGNOSIS — F17.211 NICOTINE DEPENDENCE, CIGARETTES, IN REMISSION: ICD-10-CM

## 2025-03-06 DIAGNOSIS — J44.9 CHRONIC OBSTRUCTIVE PULMONARY DISEASE, UNSPECIFIED COPD TYPE: ICD-10-CM

## 2025-03-06 DIAGNOSIS — I12.9 BENIGN HYPERTENSION WITH CKD (CHRONIC KIDNEY DISEASE) STAGE III: ICD-10-CM

## 2025-03-06 DIAGNOSIS — I50.32 CHRONIC DIASTOLIC HEART FAILURE: ICD-10-CM

## 2025-03-06 DIAGNOSIS — M25.562 CHRONIC PAIN OF LEFT KNEE: Primary | ICD-10-CM

## 2025-03-06 DIAGNOSIS — N18.30 BENIGN HYPERTENSION WITH CKD (CHRONIC KIDNEY DISEASE) STAGE III: ICD-10-CM

## 2025-03-06 DIAGNOSIS — G89.29 CHRONIC PAIN OF LEFT KNEE: Primary | ICD-10-CM

## 2025-03-06 DIAGNOSIS — D63.1 ANEMIA IN STAGE 3B CHRONIC KIDNEY DISEASE: ICD-10-CM

## 2025-03-06 DIAGNOSIS — F41.0 PANIC ANXIETY SYNDROME: ICD-10-CM

## 2025-03-06 PROCEDURE — 99215 OFFICE O/P EST HI 40 MIN: CPT | Mod: PBBFAC,PN | Performed by: PEDIATRICS

## 2025-03-06 PROCEDURE — 99999 PR PBB SHADOW E&M-EST. PATIENT-LVL V: CPT | Mod: PBBFAC,,, | Performed by: PEDIATRICS

## 2025-03-06 PROCEDURE — 99215 OFFICE O/P EST HI 40 MIN: CPT | Mod: S$PBB,,, | Performed by: PEDIATRICS

## 2025-03-06 PROCEDURE — G2211 COMPLEX E/M VISIT ADD ON: HCPCS | Mod: S$PBB,,, | Performed by: PEDIATRICS

## 2025-03-06 RX ORDER — ALPRAZOLAM 0.5 MG/1
0.5 TABLET ORAL DAILY PRN
Qty: 30 TABLET | Refills: 1 | Status: SHIPPED | OUTPATIENT
Start: 2025-03-06

## 2025-03-06 RX ORDER — PNV NO.95/FERROUS FUM/FOLIC AC 28MG-0.8MG
TABLET ORAL
COMMUNITY
Start: 2025-02-14

## 2025-03-06 RX ORDER — IPRATROPIUM BROMIDE AND ALBUTEROL 20; 100 UG/1; UG/1
1 SPRAY, METERED RESPIRATORY (INHALATION) 4 TIMES DAILY
COMMUNITY
Start: 2025-03-03

## 2025-03-06 NOTE — PROGRESS NOTES
Subjective:       Patient ID: Caryn Rodriguez is a 74 y.o. female.    Chief Complaint: Hypertension    Patient needs CT chest for screening, needs refill on xanax (admits she takes from time to time but not regularly), Has been having chronic pain in left knee and is ready to see specialist but needs referral.    Review of Systems   Constitutional:  Negative for chills, fatigue and fever.   HENT:  Negative for congestion, ear pain, postnasal drip, rhinorrhea, sinus pressure, sinus pain, sneezing and sore throat.    Respiratory:  Negative for cough, chest tightness, shortness of breath and wheezing.    Cardiovascular:  Negative for chest pain and palpitations.   Gastrointestinal:  Negative for diarrhea, nausea and vomiting.   Genitourinary:  Negative for difficulty urinating.   Musculoskeletal:  Positive for gait problem. Negative for arthralgias.        Left knee pain   Skin:  Negative for rash.   Neurological:  Negative for dizziness and headaches.   Psychiatric/Behavioral:  Negative for confusion.         A1C:      CBC:  Recent Labs   Lab 09/16/24  1057 10/30/24  0912 02/11/25  1003   WBC 7.01 7.11 8.37   RBC 4.85 4.61 4.56   Hemoglobin 14.1 13.9 13.5   Hematocrit 44.6 42.9 42.8   Platelets 323 319 324   MCV 92 93 94   MCH 29.1 30.2 29.6   MCHC 31.6 L 32.4 31.5 L     CMP:  Recent Labs   Lab 08/26/24  1210 10/30/24  0912 02/11/25  1003   Glucose 96 115 H 99   Calcium 9.7 9.3 9.4   Albumin 3.5 3.3 L 3.4 L   Total Protein 7.3 6.9 7.3   Sodium 142 145 141   Potassium 4.3 4.8 4.2   CO2 26 26 25   Chloride 107 111 H 109   BUN 13 11 13   Creatinine 1.0 1.0 0.9   Alkaline Phosphatase 72 74 72   ALT 13 12 11   AST 18 16 19   Total Bilirubin 0.2 0.3 0.3     LIPIDS:      TSH:         Objective:      Vitals:    03/06/25 1053   BP: 138/88   Pulse: 69      Physical Exam  Constitutional:       General: She is not in acute distress.     Appearance: Normal appearance.   HENT:      Head: Normocephalic and atraumatic.      Right  Ear: Tympanic membrane, ear canal and external ear normal.      Left Ear: Tympanic membrane, ear canal and external ear normal.      Nose: Nose normal.      Mouth/Throat:      Mouth: Mucous membranes are moist.      Pharynx: Oropharynx is clear.   Eyes:      Extraocular Movements: Extraocular movements intact.      Conjunctiva/sclera: Conjunctivae normal.      Pupils: Pupils are equal, round, and reactive to light.   Cardiovascular:      Rate and Rhythm: Normal rate and regular rhythm.      Pulses: Normal pulses.      Heart sounds: Normal heart sounds.   Pulmonary:      Effort: Pulmonary effort is normal. No respiratory distress.      Breath sounds: Normal breath sounds. No wheezing.   Abdominal:      General: Abdomen is flat. Bowel sounds are normal.   Musculoskeletal:         General: No swelling. Normal range of motion.      Cervical back: Normal range of motion and neck supple.   Skin:     General: Skin is warm and dry.      Findings: No rash.   Neurological:      Mental Status: She is alert and oriented to person, place, and time.      Gait: Gait normal.   Psychiatric:         Mood and Affect: Mood normal.         Behavior: Behavior normal.          Assessment:       1. Chronic pain of left knee    2. Chronic obstructive pulmonary disease, unspecified COPD type    3. Pulmonary nodules    4. Nicotine dependence, cigarettes, in remission    5. Panic anxiety syndrome    6. Benign hypertension with CKD (chronic kidney disease) stage III    7. Chronic diastolic heart failure    8. Aortic atherosclerosis    9. Chronic kidney disease (CKD) stage G3b/A1, moderately decreased glomerular filtration rate (GFR) between 30-44 mL/min/1.73 square meter and albuminuria creatinine ratio less than 30 mg/g    10. Anemia in stage 3b chronic kidney disease        Plan:     Problem List Items Addressed This Visit          Pulmonary    COPD (chronic obstructive pulmonary disease)    Overview   Trelegy; singular; PRN albuterol neb    Stable  Needs refill  Needs LDCT repeat         Relevant Orders    CT Chest Lung Screening Low Dose    Pulmonary nodules    Relevant Orders    CT Chest Lung Screening Low Dose       Cardiac/Vascular    Benign hypertension with CKD (chronic kidney disease) stage III    Overview   BP well controlled   Metoprolol 50; spironolactone 25; amlodipine 2.5  Asymptomatic     GFR stable  Follows with nephrology          Chronic diastolic heart failure    Overview   Follows with [facility]  BB, spironolactone, statin         Aortic atherosclerosis       Renal/    Chronic kidney disease (CKD) stage G3b/A1, moderately decreased glomerular filtration rate (GFR) between 30-44 mL/min/1.73 square meter and albuminuria creatinine ratio less than 30 mg/g       Oncology    Anemia in stage 3b chronic kidney disease       Orthopedic    Chronic pain of left knee - Primary    Overview   Has used walker and cane at times.  Ready to see ortho.         Relevant Orders    Ambulatory referral/consult to Orthopedics     Other Visit Diagnoses         Nicotine dependence, cigarettes, in remission        Relevant Orders    CT Chest Lung Screening Low Dose      Panic anxiety syndrome        Relevant Medications    ALPRAZolam (XANAX) 0.5 MG tablet            Labs discussed today. Had labs recently w/ Dr. Noyola. Will complete w/ Dr. Marquez soon.  Discussed continuation of healthy diet and exercise.  Discussed health maintenance including:  CT chest ordered  Continue plan of care.  Follow up in 6 months.  Contact clinic if need appointment sooner.       Leon Guardado   [facility]   3/6/25

## 2025-03-25 ENCOUNTER — RESULTS FOLLOW-UP (OUTPATIENT)
Dept: FAMILY MEDICINE | Facility: CLINIC | Age: 75
End: 2025-03-25

## 2025-05-20 DIAGNOSIS — J44.9 CHRONIC OBSTRUCTIVE PULMONARY DISEASE, UNSPECIFIED COPD TYPE: ICD-10-CM

## 2025-05-20 NOTE — TELEPHONE ENCOUNTER
Refill Routing Note   Medication(s) are not appropriate for processing by [facility] for the following reason(s):        Patient not seen by provider within 15 months  ED/Hospital Visit since last OV with provider  No active prescription written by provider    [facility] action(s):  Defer      Medication Therapy Plan: FOVS    Extended chart review required: Yes     Appointments  past 12m or future 3m with PCP    Date Provider   Last Visit   5/31/2023 Patria Marsh, DO   Next Visit   9/29/2025 Patria Marsh, DO   ED visits in past 90 days: 0        Note composed:6:26 PM 05/20/2025

## 2025-05-20 NOTE — TELEPHONE ENCOUNTER
----- Message from Marly sent at 5/20/2025 12:35 PM CDT -----  Type:  RX Refill RequestWho Called:  Pt Refill or New Rx: Refill RX Name and Strength:TRELEGY ELLIPTA 100-62.5-25 mcg DsDv COMBIVENT RESPIMAT  mcg/actuation inhaler Preferred Pharmacy with phone number:[id number] - [place], [place] - [address] [facility] [address] [facility] [id number] [facility] [address] [facility] [id number]: [phone] Fax: [phone] or Mail Order: Local Ordering Provider: Salma Would the patient rather a call back or a response via [facility]? Call Best Call Back Number: [phone] Additional Information:

## 2025-05-20 NOTE — TELEPHONE ENCOUNTER
Care Due:                  Date            Visit Type   Department     Provider  --------------------------------------------------------------------------------                                EP -                              PRIMARY      DESC FAMILY  Last Visit: 03-      [facility] ([facility] [place]  Lesleybryan Sams                              EP -                              PRIMARY      DESC FAMILY  Next Visit: 09-      [facility] [place]  Patrianano Marsh                                                            Last  Test          Frequency    Reason                     Performed    Due Date  --------------------------------------------------------------------------------    Office Visit  15 months..  montelukast..............  03- 05-    [facility] Embedded Care Due Messages. Reference number: [id number].   5/20/2025 4:03:59 PM CDT

## 2025-05-21 DIAGNOSIS — J44.9 CHRONIC OBSTRUCTIVE PULMONARY DISEASE, UNSPECIFIED COPD TYPE: ICD-10-CM

## 2025-05-21 RX ORDER — FLUTICASONE FUROATE, UMECLIDINIUM BROMIDE AND VILANTEROL TRIFENATATE 100; 62.5; 25 UG/1; UG/1; UG/1
1 POWDER RESPIRATORY (INHALATION) DAILY
Qty: 180 EACH | Refills: 3 | Status: SHIPPED | OUTPATIENT
Start: 2025-05-21 | End: 2025-05-21 | Stop reason: SDUPTHER

## 2025-05-21 NOTE — TELEPHONE ENCOUNTER
No care due was identified.  [facility] Embedded Care Due Messages. Reference number: [id number].   5/21/2025 11:35:59 AM CDT

## 2025-05-22 RX ORDER — FLUTICASONE FUROATE, UMECLIDINIUM BROMIDE AND VILANTEROL TRIFENATATE 100; 62.5; 25 UG/1; UG/1; UG/1
1 POWDER RESPIRATORY (INHALATION) DAILY
Qty: 180 EACH | Refills: 3 | Status: SHIPPED | OUTPATIENT
Start: 2025-05-22

## 2025-06-10 ENCOUNTER — TELEPHONE (OUTPATIENT)
Dept: FAMILY MEDICINE | Facility: CLINIC | Age: 75
End: 2025-06-10
Payer: MEDICARE

## 2025-06-10 NOTE — TELEPHONE ENCOUNTER
----- Message from Annita sent at 6/10/2025 11:44 AM CDT -----  Type:  Upcoming Appt for 06/12Who Called: Pt Would the patient rather a call back or a response via [facility]? Call back Best Call Back Number: [phone] Information: Please be advised, pt states that if both she and her  can be seen for that same appt which pt has scheduled for 06/12 at 1PM, pt states that both have been able to be seen together before in the past

## 2025-06-12 ENCOUNTER — RESULTS FOLLOW-UP (OUTPATIENT)
Dept: FAMILY MEDICINE | Facility: CLINIC | Age: 75
End: 2025-06-12

## 2025-06-12 ENCOUNTER — OFFICE VISIT (OUTPATIENT)
Dept: FAMILY MEDICINE | Facility: CLINIC | Age: 75
End: 2025-06-12
Payer: MEDICARE

## 2025-06-12 VITALS
DIASTOLIC BLOOD PRESSURE: 78 MMHG | SYSTOLIC BLOOD PRESSURE: 140 MMHG | HEIGHT: 64 IN | HEART RATE: 64 BPM | BODY MASS INDEX: 27.57 KG/M2 | TEMPERATURE: 98 F | WEIGHT: 161.5 LBS | OXYGEN SATURATION: 95 %

## 2025-06-12 DIAGNOSIS — H69.93 DYSFUNCTION OF BOTH EUSTACHIAN TUBES: ICD-10-CM

## 2025-06-12 DIAGNOSIS — N18.32 CHRONIC KIDNEY DISEASE (CKD) STAGE G3B/A1, MODERATELY DECREASED GLOMERULAR FILTRATION RATE (GFR) BETWEEN 30-44 ML/MIN/1.73 SQUARE METER AND ALBUMINURIA CREATININE RATIO LESS THAN 30 MG/G: ICD-10-CM

## 2025-06-12 DIAGNOSIS — J44.1 COPD EXACERBATION: Primary | ICD-10-CM

## 2025-06-12 DIAGNOSIS — I50.32 CHRONIC DIASTOLIC HEART FAILURE: ICD-10-CM

## 2025-06-12 DIAGNOSIS — R05.8 POST-VIRAL COUGH SYNDROME: ICD-10-CM

## 2025-06-12 PROCEDURE — 99999 PR PBB SHADOW E&M-EST. PATIENT-LVL V: CPT | Mod: PBBFAC,,, | Performed by: PEDIATRICS

## 2025-06-12 PROCEDURE — G2211 COMPLEX E/M VISIT ADD ON: HCPCS | Mod: ,,, | Performed by: PEDIATRICS

## 2025-06-12 PROCEDURE — 99215 OFFICE O/P EST HI 40 MIN: CPT | Mod: PBBFAC,PN | Performed by: PEDIATRICS

## 2025-06-12 PROCEDURE — 99215 OFFICE O/P EST HI 40 MIN: CPT | Mod: S$PBB,,, | Performed by: PEDIATRICS

## 2025-06-12 RX ORDER — PREDNISONE 20 MG/1
TABLET ORAL
Qty: 11 TABLET | Refills: 0 | Status: SHIPPED | OUTPATIENT
Start: 2025-06-12 | End: 2025-06-21

## 2025-06-12 RX ORDER — METOPROLOL SUCCINATE 100 MG/1
100 TABLET, EXTENDED RELEASE ORAL 2 TIMES DAILY
COMMUNITY

## 2025-06-12 RX ORDER — AZELASTINE 1 MG/ML
1 SPRAY, METERED NASAL 2 TIMES DAILY
Qty: 30 ML | Refills: 0 | Status: SHIPPED | OUTPATIENT
Start: 2025-06-12 | End: 2026-06-12

## 2025-06-12 RX ORDER — DOXYCYCLINE 100 MG/1
100 CAPSULE ORAL EVERY 12 HOURS
Qty: 14 CAPSULE | Refills: 0 | Status: SHIPPED | OUTPATIENT
Start: 2025-06-12 | End: 2025-06-19

## 2025-06-12 NOTE — PROGRESS NOTES
Subjective:      Patient ID: Caryn Rodriguez is a 75 y.o. female.    Chief Complaint: Cough (Patient report having a cough for 30 days- extreme tiredness ) and Wheezing    Patient reports 30 days of coughing, possibly covid or the flu in the beginning but the cough is still bad. Coughing off and on at night, cough is more wet with clear mucus. Had fever and chills occasionally in the beginning but both are gone now. Has felt dizzy and extreme fatigue for the last 30 days, has had tons of runny nose. Feels sore in the chest, ribs and back but feels like this is from coughing. Still has sob. Is using trelegy, combivent inhaler and albuterol.    Cough  Associated symptoms include wheezing. Pertinent negatives include no chest pain, chills, ear pain, fever, headaches, postnasal drip, rash, rhinorrhea, sore throat or shortness of breath.   Wheezing   Associated symptoms include coughing. Pertinent negatives include no chest pain, chills, diarrhea, ear pain, fever, headaches, rash, rhinorrhea, shortness of breath, sore throat or vomiting.     Review of Systems   Constitutional:  Positive for fatigue. Negative for chills and fever.   HENT:  Negative for congestion, ear pain, postnasal drip, rhinorrhea, sinus pressure, sinus pain, sneezing and sore throat.    Respiratory:  Positive for cough and wheezing. Negative for chest tightness and shortness of breath.    Cardiovascular:  Negative for chest pain and palpitations.   Gastrointestinal:  Negative for diarrhea, nausea and vomiting.   Genitourinary:  Negative for difficulty urinating.   Musculoskeletal:  Negative for arthralgias.   Skin:  Negative for rash.   Neurological:  Positive for dizziness. Negative for headaches.   Psychiatric/Behavioral:  Negative for confusion.         Current Outpatient Medications on File Prior to Visit   Medication Sig    allopurinoL (ZYLOPRIM) 100 MG tablet TAKE 1 TABLET BY MOUTH ONE TIME DAILY    ALPRAZolam (XANAX) 0.5 MG tablet Take 1  tablet (0.5 mg total) by mouth daily as needed for Anxiety.    amLODIPine (NORVASC) 2.5 MG tablet     aspirin 81 MG Chew Take 81 mg by mouth once daily.    b complex vitamins capsule Take 1 capsule by mouth once daily.    baclofen (LIORESAL) 5 mg Tab tablet Take 5 mg by mouth.    COMBIVENT RESPIMAT  mcg/actuation inhaler Inhale 1 puff into the lungs 4 (four) times daily.    DULoxetine (CYMBALTA) 30 MG capsule     fluticasone-umeclidin-vilanter (TRELEGY ELLIPTA) 100-62.5-25 mcg DsDv Inhale 1 puff into the lungs once daily.    gabapentin (NEURONTIN) 300 MG capsule Take 300 mg by mouth once daily.     metoprolol succinate (TOPROL-XL) 100 MG 24 hr tablet Take 100 mg by mouth 2 (two) times daily.    montelukast (SINGULAIR) 10 mg tablet TAKE 1 TABLET BY MOUTH EVERY EVENING    rosuvastatin (CRESTOR) 20 MG tablet Take 20 mg by mouth once daily.    vitamin E 1000 UNIT capsule     albuterol (ACCUNEB) 1.25 mg/3 mL Nebu Take 3 mLs (1.25 mg total) by nebulization every 6 (six) hours as needed. Rescue    metoprolol succinate (TOPROL-XL) 50 MG 24 hr tablet Take 100 mg by mouth 2 (two) times daily.    omeprazole (PRILOSEC) 40 MG capsule Take 1 capsule (40 mg total) by mouth once daily.    spironolactone (ALDACTONE) 25 MG tablet Take 1 tablet (25 mg total) by mouth once daily.     No current facility-administered medications on file prior to visit.        Objective:     Vitals:    06/12/25 0923   BP: (!) 140/78   Pulse: 64   Temp: 97.9 °F (36.6 °C)      Physical Exam  Constitutional:       General: She is not in acute distress.     Appearance: Normal appearance.   HENT:      Head: Normocephalic and atraumatic.      Right Ear: Ear canal and external ear normal. Tympanic membrane is bulging.      Left Ear: Ear canal and external ear normal. Tympanic membrane is bulging.      Ears:      Comments: Bubbling present in bilateral inner ear.     Nose: Nose normal.      Mouth/Throat:      Mouth: Mucous membranes are moist.       Pharynx: Oropharynx is clear.   Eyes:      Extraocular Movements: Extraocular movements intact.      Conjunctiva/sclera: Conjunctivae normal.      Pupils: Pupils are equal, round, and reactive to light.   Cardiovascular:      Rate and Rhythm: Normal rate and regular rhythm.      Pulses: Normal pulses.      Heart sounds: Normal heart sounds.   Pulmonary:      Effort: Pulmonary effort is normal. No respiratory distress.      Breath sounds: Examination of the right-upper field reveals decreased breath sounds. Examination of the left-upper field reveals decreased breath sounds and wheezing. Examination of the right-middle field reveals decreased breath sounds. Examination of the left-middle field reveals decreased breath sounds and wheezing. Examination of the right-lower field reveals decreased breath sounds. Examination of the left-lower field reveals decreased breath sounds and rhonchi. Decreased breath sounds, wheezing and rhonchi present.   Abdominal:      General: Abdomen is flat. Bowel sounds are normal.   Musculoskeletal:         General: No swelling. Normal range of motion.      Cervical back: Normal range of motion and neck supple.   Skin:     General: Skin is warm and dry.      Findings: No rash.   Neurological:      Mental Status: She is alert and oriented to person, place, and time.      Gait: Gait normal.   Psychiatric:         Mood and Affect: Mood normal.         Behavior: Behavior normal.        Assessment:         1. COPD exacerbation    2. Post-viral cough syndrome    3. Dysfunction of both eustachian tubes    4. Chronic kidney disease (CKD) stage G3b/A1, moderately decreased glomerular filtration rate (GFR) between 30-44 mL/min/1.73 square meter and albuminuria creatinine ratio less than 30 mg/g    5. Chronic diastolic heart failure          Plan:   1. COPD exacerbation  - X-Ray Chest PA And Lateral; Future  - predniSONE (DELTASONE) 20 MG tablet; Take 2 tablets (40 mg total) by mouth once daily for 3  days, THEN 1 tablet (20 mg total) once daily for 3 days, THEN 0.5 tablets (10 mg total) once daily for 3 days.  Dispense: 11 tablet; Refill: 0  - doxycycline (VIBRAMYCIN) 100 MG Cap; Take 1 capsule (100 mg total) by mouth every 12 (twelve) hours. for 7 days  Dispense: 14 capsule; Refill: 0  - azelastine (ASTELIN) 137 mcg (0.1 %) nasal spray; 1 spray (137 mcg total) by Nasal route 2 (two) times daily.  Dispense: 30 mL; Refill: 0    2. Post-viral cough syndrome  - X-Ray Chest PA And Lateral; Future  - predniSONE (DELTASONE) 20 MG tablet; Take 2 tablets (40 mg total) by mouth once daily for 3 days, THEN 1 tablet (20 mg total) once daily for 3 days, THEN 0.5 tablets (10 mg total) once daily for 3 days.  Dispense: 11 tablet; Refill: 0  - doxycycline (VIBRAMYCIN) 100 MG Cap; Take 1 capsule (100 mg total) by mouth every 12 (twelve) hours. for 7 days  Dispense: 14 capsule; Refill: 0  - azelastine (ASTELIN) 137 mcg (0.1 %) nasal spray; 1 spray (137 mcg total) by Nasal route 2 (two) times daily.  Dispense: 30 mL; Refill: 0    3. Dysfunction of both eustachian tubes  - predniSONE (DELTASONE) 20 MG tablet; Take 2 tablets (40 mg total) by mouth once daily for 3 days, THEN 1 tablet (20 mg total) once daily for 3 days, THEN 0.5 tablets (10 mg total) once daily for 3 days.  Dispense: 11 tablet; Refill: 0  - azelastine (ASTELIN) 137 mcg (0.1 %) nasal spray; 1 spray (137 mcg total) by Nasal route 2 (two) times daily.  Dispense: 30 mL; Refill: 0    4. Chronic kidney disease (CKD) stage G3b/A1, moderately decreased glomerular filtration rate (GFR) between 30-44 mL/min/1.73 square meter and albuminuria creatinine ratio less than 30 mg/g    5. Chronic diastolic heart failure     Discussed proper use of prescribed inhalers.  Patient states she recently got refills so has plenty.  Discussed proper tapering of steroids, 9 day course.  Discussed nasal spray for congestion, inner ear pressure.  Begin antihistamine of your choice for runny  nose/mucus, inner ear pressure.  Begin and finish all doxycycline as prescribed.  Will complete chest xray now, if pneumonia will add additional abx.  Go to ER if SOB becomes worse.  Follow up 1 week if not better.    Leon Guardado NP   [facility]  6/12/25

## 2025-06-18 ENCOUNTER — PATIENT MESSAGE (OUTPATIENT)
Dept: ADMINISTRATIVE | Facility: HOSPITAL | Age: 75
End: 2025-06-18
Payer: MEDICARE

## 2025-07-28 DIAGNOSIS — Z00.00 ENCOUNTER FOR MEDICARE ANNUAL WELLNESS EXAM: ICD-10-CM
